# Patient Record
Sex: MALE | Race: WHITE | Employment: OTHER | URBAN - METROPOLITAN AREA
[De-identification: names, ages, dates, MRNs, and addresses within clinical notes are randomized per-mention and may not be internally consistent; named-entity substitution may affect disease eponyms.]

---

## 2017-02-17 ENCOUNTER — HOSPITAL ENCOUNTER (EMERGENCY)
Age: 47
Discharge: HOME | End: 2017-02-17
Attending: EMERGENCY MEDICINE
Payer: COMMERCIAL

## 2017-02-17 ENCOUNTER — APPOINTMENT (OUTPATIENT)
Dept: GENERAL RADIOLOGY | Age: 47
End: 2017-02-17
Attending: EMERGENCY MEDICINE
Payer: COMMERCIAL

## 2017-02-17 VITALS
BODY MASS INDEX: 30.67 KG/M2 | WEIGHT: 190 LBS | RESPIRATION RATE: 16 BRPM | OXYGEN SATURATION: 98 % | DIASTOLIC BLOOD PRESSURE: 73 MMHG | SYSTOLIC BLOOD PRESSURE: 124 MMHG | TEMPERATURE: 97.8 F | HEART RATE: 69 BPM
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
## 2017-02-17 DIAGNOSIS — S61.451A DOG BITE OF RIGHT HAND, INITIAL ENCOUNTER: Primary | ICD-10-CM

## 2017-02-17 PROCEDURE — 90471 IMMUNIZATION ADMIN: CPT | Performed by: EMERGENCY MEDICINE

## 2017-02-17 PROCEDURE — 99282 EMERGENCY DEPT VISIT SF MDM: CPT | Performed by: EMERGENCY MEDICINE

## 2017-02-17 PROCEDURE — 74011250636 HC RX REV CODE- 250/636: Performed by: EMERGENCY MEDICINE

## 2017-02-17 PROCEDURE — 90715 TDAP VACCINE 7 YRS/> IM: CPT | Performed by: EMERGENCY MEDICINE

## 2017-02-17 PROCEDURE — 73130 X-RAY EXAM OF HAND: CPT

## 2017-02-17 RX ORDER — AMOXICILLIN AND CLAVULANATE POTASSIUM 875; 125 MG/1; MG/1
1 TABLET, FILM COATED ORAL 2 TIMES DAILY
Qty: 10 TAB | Refills: 0 | Status: SHIPPED | OUTPATIENT
Start: 2017-02-17 | End: 2020-05-11 | Stop reason: CLARIF

## 2017-02-17 RX ADMIN — TETANUS TOXOID, REDUCED DIPHTHERIA TOXOID AND ACELLULAR PERTUSSIS VACCINE, ADSORBED 0.5 ML: 5; 2.5; 8; 8; 2.5 SUSPENSION INTRAMUSCULAR at 21:47

## 2017-02-18 NOTE — ED PROVIDER NOTES
HPI Comments: Patient states he reached down to feed his sisters dog and the dog bit him on his right hand. Right hand dominant. Puncture wounds, pain, swelling. Has been immunized for tetanus - last booster > 10 years. Patient is a 40 y.o. male presenting with dog bite. The history is provided by the patient and the spouse. Dog Bite    The incident occurred just prior to arrival. The incident occurred at home. There is an injury to the right thumb. The pain is mild. It is unlikely that a foreign body is present. He is right-handed. His tetanus status is out of date. History reviewed. No pertinent past medical history. Past Surgical History:   Procedure Laterality Date    Hx orthopaedic       r knee scope         History reviewed. No pertinent family history. Social History     Social History    Marital status:      Spouse name: N/A    Number of children: N/A    Years of education: N/A     Occupational History    Not on file. Social History Main Topics    Smoking status: Never Smoker    Smokeless tobacco: Never Used    Alcohol use Yes      Comment: occ    Drug use: No    Sexual activity: No     Other Topics Concern    Not on file     Social History Narrative         ALLERGIES: Review of patient's allergies indicates no known allergies. Review of Systems   Constitutional: Negative. Respiratory: Negative. Cardiovascular: Negative. Musculoskeletal: Positive for myalgias. Skin: Positive for wound. Neurological: Negative. Vitals:    02/17/17 2040   BP: 124/73   Pulse: 69   Resp: 16   Temp: 97.8 °F (36.6 °C)   SpO2: 98%   Weight: 86.2 kg (190 lb)            Physical Exam   Constitutional: He appears well-developed and well-nourished. Cardiovascular: Normal rate, regular rhythm, normal heart sounds and intact distal pulses.     Pulmonary/Chest: Effort normal and breath sounds normal.   Musculoskeletal:   Right hand with swelling dorsal aspect over index and middle metacarpal areas distally. Puncture wounds to the palm, right lateral thenar eminence, cuticle area of thumb nail. Subungual hematoma 3 mm crescent base of nail. Vitals reviewed.        MDM  ED Course       Procedures      Dog bite right hand  Wounds cleaned with soap and water  X ray no fracture  TDAP booster  Rx Augmentin 875 bid prophylaxis  Elevation, ice, ibuprofen, clean with Dial soap  Return if worse of no better  Follow up PCP  Instructions discussed with patient and wife

## 2017-02-18 NOTE — DISCHARGE INSTRUCTIONS
Elevate hand to reduce swelling  Ice to reduce pain and swelling  Ibuprofen as needed  Keep hand clean with Dial soap and water  Take antibiotics until finished       Mordeduras de animales: Instrucciones de cuidado - [ Animal Bites: Care Instructions ]  Instrucciones de cuidado  Después de la mordedura de un animal, la preocupación principal es essence infección. La posibilidad de que se infecte depende del tipo de animal que lo mordió, la parte del cuerpo donde lo mordió y reno estado de jevon general. Muchas mordeduras de animales no se cierran con puntos de sutura, ya que esto puede aumentar la probabilidad de infección. La mordedura puede tardar en sanar desde 7 días hasta varios meses, dependiendo de lo grave que sea. Cuidar maria elena reno herida en casa ayudará a que sane y reducirá la probabilidad de infección. El médico lo rooney examinado minuciosamente, jackie pueden desarrollarse problemas más tarde. Si nota algún problema o nuevos síntomas, busque tratamiento médico de inmediato. La atención de seguimiento es essence parte clave de reno tratamiento y seguridad. Asegúrese de hacer y acudir a todas las citas, y llame a reno médico si está teniendo problemas. También es essence buena idea saber los resultados de los exámenes y mantener essence lista de los medicamentos que janell. ¿Cómo puede cuidarse en el hogar? · Si reno médico le dijo cómo cuidarse la herida, siga las instrucciones de reno médico. Si no le leslie instrucciones, siga estos consejos generales:  ¨ Después de 24 a 48 horas, lávese la herida suavemente con agua limpia 2 veces al día. No se frote ni empape la herida. No use peróxido de hidrógeno (agua [place]) ni alcohol, los cuales pueden retrasar la sanación. ¨ Puede cubrirse la herida con essence capa delgada de vaselina y essence venda no adherente. ¨ Aplíquese más vaselina y [facility] la venda según sea necesario. · Después de ducharse, séquese suavemente la herida con essence toalla limpia.   · Si reno médico le rooney [address] herida, cúbrase la venda con essence bolsa de plástico antes de ducharse. · Un poco de enrojecimiento de la piel e hinchazón alrededor de los bordes de la herida y de las suturas o grapas son normales. La herida puede picar o irritarse. No se rasque ni frote la herida. · Pregúntele a reno médico si puede sarwat un analgésico (medicamento para el dolor) de venta rere, maria eugenia acetaminofén (Tylenol), ibuprofeno (Advil, Motrin) o naproxeno (Aleve). Natalia y siga todas las instrucciones de la [place]. · No tome dos o más analgésicos al mismo tiempo a menos que el médico se lo haya indicado. Muchos analgésicos contienen acetaminofén, lo cual es Tylenol. El exceso de acetaminofén (Tylenol) puede ser dañino. · Si reno mordedura lo pone en riesgo de contraer edwin, le aplicarán essence serie de vacunas a lo shana de las próximas semanas para prevenir la edwin. [place] médico le indicará cuándo vacunarse. Es muy importante que se ponga el ciclo completo de vacunas. Siga de [address] indicaciones de reno médico.  · Puede que necesite essence vacuna antitetánica si no le denise aplicado essence en los últimos 5 Los manohar. · Si reno médico le recetó antibióticos, tómelos según las indicaciones. No deje de tomarlos solo porque se sienta mejor. Debe sarwat todos los antibióticos hasta terminarlos. ¿Cuándo debe pedir ayuda? Llame a reno médico ahora mismo o busque atención médica inmediata si:  · La piel próxima a la mordedura se vuelve fría o pálida, o cambia de color. · Pierde sensibilidad en la natasha cerca de la mordedura, o siente entumecimiento u hormigueo. · Tiene dificultades para  essence extremidad cercana a la mordedura. · Tiene síntomas de infección, tales maria eugenia:  ¨ Aumento del dolor, la hinchazón, la temperatura o el enrojecimiento cerca de la herida. ¨ Vetas rojizas que salen de la herida. ¨ Pus que sale de la herida. Alisia Corral. · La addie empapa la venda. Es normal que salgan pequeñas cantidades de [place]. · [place] dolor está empeorando.   Vigile muy de cerca los [facility], y asegúrese de comunicarse con reno médico si no está mejorando maria eugenia se esperaba. ¿Dónde puede encontrar más información en inglés? Sofie Payton a http://bianka-fabrice.info/. Campbell Zhong D099 en la búsqueda para aprender más acerca de \"Mordeduras de animales: Instrucciones de cuidado - [ Animal Bites: Care Instructions ]. \"  Revisado: [address], 2016  Versión del contenido: 11.1  © 4216-3112 [employer], [employer]. Las instrucciones de cuidado fueron adaptadas bajo licencia por [employer] (which disclaims liability or warranty for this information). Si usted tiene [place] afección médica o sobre estas instrucciones, siempre pregunte a reno profesional de jevon. [employer] niega toda garantía o responsabilidad por reno uso de esta información.

## 2017-02-18 NOTE — ED TRIAGE NOTES
Pt reports he was trying to feed his sisters dog and got bitten on the right hand. Pt unsure if dogs vaccines are up to date.       Windy Childs RN

## 2020-05-11 RX ORDER — SODIUM CHLORIDE 0.9 % (FLUSH) 0.9 %
5-40 SYRINGE (ML) INJECTION AS NEEDED
Status: CANCELLED | OUTPATIENT
Start: 2020-05-11

## 2020-05-11 RX ORDER — SODIUM CHLORIDE 0.9 % (FLUSH) 0.9 %
5-40 SYRINGE (ML) INJECTION EVERY 8 HOURS
Status: CANCELLED | OUTPATIENT
Start: 2020-05-11

## 2020-05-14 NOTE — H&P
Date of Service: 2020-05-04  Work Status:  ????? Allergies:????? Medications:Meloxicam (7.5 MG); Vitamin C;ZyrTEC Allergy    CC: Injury of the right shoulder    HPI: The patient 43-year-old right-hand-dominant  male who works as a . He was playing with his dog and he stumbled and tripped and fell backwards catching or on his right upper extremity and injuring the shoulder. This was about a month ago on 4/3/20. He is continued to have some pain and weakness in the arm and shoulder reason painting with his left hand because he has difficulty in raising the right arm. He has been seen in emergency MDs had an MRI that apparently showed tear of his rotator cuff. He says the shoulder aches down into the back of his forearm bothers him at night as well as with movements. He has had some neck pain but doesnt seem related to his shoulder problem specifically. PHSH & ROS:  This form has been filled out reviewed and is included in the chart. Patient has some scattered positive findings on the review of systems most related to his history of present illness. The patient drinks he doesnt smoke. He has a history of heartburn hes had surgery on his knee. Current medicines: Are listed. drug allergies: None known    PE: The patient is an alert oriented generally healthy-appearing middle-aged  male. He has 5-6 inches tall and weighs 190 pounds. His neck shows good gentle range of motion without masses or asymmetry. The right shoulder shows some diffuse subacromial tenderness to palpation his before meals joint is not painful. He has decreased range of motion of the shoulder ache and really not quite raise the arm even to 90° of flexion or abduction. Passively I am able to bring his arm up to 140° or more of flexion and abduction. At 90° of abduction ER/IR is 90/85. He has weakness in abduction against resistance. He has a positive overhead impingement sign.   He has negative drop arm test.  Circulation: There is a palpable radial pulse at the right wrist.  Neurologic exam: Sensation is intact to light touch in the radial ulnar median nerve distribution  Skin and nails: The patient has some tattoos on the lateral aspect of his right arm but they have a lot of the lateral margin of the acromion by 5 or 6 cm or more. He has no obvious open wounds or lesions his nails appear normal.      MRI: The patient is had an MRI done at emergency room date. I reviewed the films and appears that he has a full-thickness tear of the supraspinatus. I do not have the written report from the radiologist available  Diagnosis: Tear of the right rotator cuff    Disposition: The problem was discussed with the patient appears that he has a full-thickness tear of his rotator cuff with retraction and weakness. Going to get the report from this MRI that it appears that he does have the rotator cuff tear and I recommended he consider surgical repair. He wishes to proceed with scheduling for the procedure to be done as an outpatient under brachial plexus block and general anesthesia I discussed with him the recovery time and the need to have this immobilized and protected for several weeks afterwards. Hell be scheduled for an open right rotator cuff repair and acromioplasty.

## 2020-05-18 ENCOUNTER — ANESTHESIA EVENT (OUTPATIENT)
Dept: SURGERY | Age: 50
End: 2020-05-18
Payer: COMMERCIAL

## 2020-05-19 ENCOUNTER — HOSPITAL ENCOUNTER (OUTPATIENT)
Age: 50
Setting detail: OUTPATIENT SURGERY
Discharge: HOME | End: 2020-05-19
Attending: ORTHOPAEDIC SURGERY | Admitting: ORTHOPAEDIC SURGERY
Payer: COMMERCIAL

## 2020-05-19 ENCOUNTER — ANESTHESIA (OUTPATIENT)
Dept: SURGERY | Age: 50
End: 2020-05-19
Payer: COMMERCIAL

## 2020-05-19 VITALS
RESPIRATION RATE: 19 BRPM | WEIGHT: 190 LBS | HEART RATE: 66 BPM | TEMPERATURE: 97.7 F | BODY MASS INDEX: 30.67 KG/M2 | SYSTOLIC BLOOD PRESSURE: 129 MMHG | DIASTOLIC BLOOD PRESSURE: 76 MMHG | OXYGEN SATURATION: 94 %

## 2020-05-19 DIAGNOSIS — G89.18 POST-OP PAIN: Primary | ICD-10-CM

## 2020-05-19 PROCEDURE — 74011000250 HC RX REV CODE- 250: Performed by: NURSE ANESTHETIST, CERTIFIED REGISTERED

## 2020-05-19 PROCEDURE — 76210000020 HC REC RM PH II FIRST 0.5 HR: Performed by: ORTHOPAEDIC SURGERY

## 2020-05-19 PROCEDURE — 76942 ECHO GUIDE FOR BIOPSY: CPT | Performed by: ORTHOPAEDIC SURGERY

## 2020-05-19 PROCEDURE — 76010000162 HC OR TIME 1.5 TO 2 HR INTENSV-TIER 1: Performed by: ORTHOPAEDIC SURGERY

## 2020-05-19 PROCEDURE — C1713 ANCHOR/SCREW BN/BN,TIS/BN: HCPCS | Performed by: ORTHOPAEDIC SURGERY

## 2020-05-19 PROCEDURE — 77030002913 HC SUT ETHBND J&J -B: Performed by: ORTHOPAEDIC SURGERY

## 2020-05-19 PROCEDURE — 77030016370 HC SUT ULTBRD S&N -B: Performed by: ORTHOPAEDIC SURGERY

## 2020-05-19 PROCEDURE — 77030002933 HC SUT MCRYL J&J -A: Performed by: ORTHOPAEDIC SURGERY

## 2020-05-19 PROCEDURE — 77030036560 HC SHLDR ARM PAD ABDUCTN S2SG -B: Performed by: ORTHOPAEDIC SURGERY

## 2020-05-19 PROCEDURE — 77030019908 HC STETH ESOPH SIMS -A: Performed by: ANESTHESIOLOGY

## 2020-05-19 PROCEDURE — 74011250636 HC RX REV CODE- 250/636: Performed by: NURSE ANESTHETIST, CERTIFIED REGISTERED

## 2020-05-19 PROCEDURE — 77030002966 HC SUT PDS J&J -A: Performed by: ORTHOPAEDIC SURGERY

## 2020-05-19 PROCEDURE — 74011250636 HC RX REV CODE- 250/636: Performed by: ANESTHESIOLOGY

## 2020-05-19 PROCEDURE — 76010010054 HC POST OP PAIN BLOCK: Performed by: ORTHOPAEDIC SURGERY

## 2020-05-19 PROCEDURE — 77030039425 HC BLD LARYNG TRULITE DISP TELE -A: Performed by: ANESTHESIOLOGY

## 2020-05-19 PROCEDURE — 74011250637 HC RX REV CODE- 250/637: Performed by: ANESTHESIOLOGY

## 2020-05-19 PROCEDURE — 77030040361 HC SLV COMPR DVT MDII -B: Performed by: ORTHOPAEDIC SURGERY

## 2020-05-19 PROCEDURE — 76210000006 HC OR PH I REC 0.5 TO 1 HR: Performed by: ORTHOPAEDIC SURGERY

## 2020-05-19 PROCEDURE — 74011000250 HC RX REV CODE- 250: Performed by: ANESTHESIOLOGY

## 2020-05-19 PROCEDURE — 76060000035 HC ANESTHESIA 2 TO 2.5 HR: Performed by: ORTHOPAEDIC SURGERY

## 2020-05-19 PROCEDURE — 77030003602 HC NDL NRV BLK BBMI -B: Performed by: ANESTHESIOLOGY

## 2020-05-19 PROCEDURE — 74011250636 HC RX REV CODE- 250/636: Performed by: ORTHOPAEDIC SURGERY

## 2020-05-19 PROCEDURE — 77030018836 HC SOL IRR NACL ICUM -A: Performed by: ORTHOPAEDIC SURGERY

## 2020-05-19 PROCEDURE — 77030041434 HC IMPL CLLGN REGENETEN SN -H1: Performed by: ORTHOPAEDIC SURGERY

## 2020-05-19 DEVICE — BONE ANCHORS 3 WITH ARTHROSCOPIC DELIVERY SYSTEM ADVANCED
Type: IMPLANTABLE DEVICE | Site: SHOULDER | Status: FUNCTIONAL
Brand: BONE ANCHORS WITH ARTHROSCOPIC DELIVERY SYSTEM - ADVANCED

## 2020-05-19 DEVICE — FOOTPRINT ULTRA PK SUTURE ANCHOR 4.5
Type: IMPLANTABLE DEVICE | Site: SHOULDER | Status: FUNCTIONAL
Brand: FOOTPRINT

## 2020-05-19 DEVICE — IMPLANTABLE DEVICE
Type: IMPLANTABLE DEVICE | Site: SHOULDER | Status: FUNCTIONAL
Brand: BIOINDUCTIVE IMPLANT WITH ARTHROSCOPIC DELIVERY SYSTEM - MEDIUM

## 2020-05-19 DEVICE — TWINFIX ULTRA 4.5 MM POLY L LACTIC                                    ACID-HA SUTURE ANCHOR WITH TWO NO.2                                    ULTRABRAID SUTURES BLUE,                                    BLUE-COBRAID WITH NEEDLES
Type: IMPLANTABLE DEVICE | Site: SHOULDER | Status: FUNCTIONAL
Brand: TWINFIX

## 2020-05-19 DEVICE — ANCHOR SHLDR TEND 8 BIOINDCTVE -- USE W/2503-A FORMERLY 2516-1: Type: IMPLANTABLE DEVICE | Site: SHOULDER | Status: FUNCTIONAL

## 2020-05-19 RX ORDER — SODIUM CHLORIDE, SODIUM LACTATE, POTASSIUM CHLORIDE, CALCIUM CHLORIDE 600; 310; 30; 20 MG/100ML; MG/100ML; MG/100ML; MG/100ML
75 INJECTION, SOLUTION INTRAVENOUS CONTINUOUS
Status: DISCONTINUED | OUTPATIENT
Start: 2020-05-19 | End: 2020-05-19 | Stop reason: HOSPADM

## 2020-05-19 RX ORDER — ONDANSETRON 8 MG/1
8 TABLET, ORALLY DISINTEGRATING ORAL
Qty: 12 TAB | Refills: 1 | Status: SHIPPED | OUTPATIENT
Start: 2020-05-19 | End: 2020-10-05

## 2020-05-19 RX ORDER — HYDROMORPHONE HYDROCHLORIDE 2 MG/ML
0.5 INJECTION, SOLUTION INTRAMUSCULAR; INTRAVENOUS; SUBCUTANEOUS
Status: DISCONTINUED | OUTPATIENT
Start: 2020-05-19 | End: 2020-05-19 | Stop reason: HOSPADM

## 2020-05-19 RX ORDER — ROCURONIUM BROMIDE 10 MG/ML
INJECTION, SOLUTION INTRAVENOUS AS NEEDED
Status: DISCONTINUED | OUTPATIENT
Start: 2020-05-19 | End: 2020-05-19 | Stop reason: HOSPADM

## 2020-05-19 RX ORDER — CEFAZOLIN SODIUM/WATER 2 G/20 ML
2 SYRINGE (ML) INTRAVENOUS
Status: COMPLETED | OUTPATIENT
Start: 2020-05-19 | End: 2020-05-19

## 2020-05-19 RX ORDER — OXYCODONE HYDROCHLORIDE 5 MG/1
10 TABLET ORAL
Status: DISCONTINUED | OUTPATIENT
Start: 2020-05-19 | End: 2020-05-19 | Stop reason: HOSPADM

## 2020-05-19 RX ORDER — ONDANSETRON 2 MG/ML
INJECTION INTRAMUSCULAR; INTRAVENOUS AS NEEDED
Status: DISCONTINUED | OUTPATIENT
Start: 2020-05-19 | End: 2020-05-19 | Stop reason: HOSPADM

## 2020-05-19 RX ORDER — BUPIVACAINE HYDROCHLORIDE AND EPINEPHRINE 5; 5 MG/ML; UG/ML
INJECTION, SOLUTION EPIDURAL; INTRACAUDAL; PERINEURAL
Status: COMPLETED | OUTPATIENT
Start: 2020-05-19 | End: 2020-05-19

## 2020-05-19 RX ORDER — MIDAZOLAM HYDROCHLORIDE 1 MG/ML
2 INJECTION, SOLUTION INTRAMUSCULAR; INTRAVENOUS ONCE
Status: COMPLETED | OUTPATIENT
Start: 2020-05-19 | End: 2020-05-19

## 2020-05-19 RX ORDER — MIDAZOLAM HYDROCHLORIDE 1 MG/ML
2 INJECTION, SOLUTION INTRAMUSCULAR; INTRAVENOUS ONCE
Status: DISCONTINUED | OUTPATIENT
Start: 2020-05-19 | End: 2020-05-19 | Stop reason: HOSPADM

## 2020-05-19 RX ORDER — DEXAMETHASONE SODIUM PHOSPHATE 4 MG/ML
INJECTION, SOLUTION INTRA-ARTICULAR; INTRALESIONAL; INTRAMUSCULAR; INTRAVENOUS; SOFT TISSUE AS NEEDED
Status: DISCONTINUED | OUTPATIENT
Start: 2020-05-19 | End: 2020-05-19 | Stop reason: HOSPADM

## 2020-05-19 RX ORDER — SODIUM CHLORIDE 0.9 % (FLUSH) 0.9 %
5-40 SYRINGE (ML) INJECTION EVERY 8 HOURS
Status: DISCONTINUED | OUTPATIENT
Start: 2020-05-19 | End: 2020-05-19 | Stop reason: HOSPADM

## 2020-05-19 RX ORDER — SUCCINYLCHOLINE CHLORIDE 20 MG/ML
INJECTION INTRAMUSCULAR; INTRAVENOUS AS NEEDED
Status: DISCONTINUED | OUTPATIENT
Start: 2020-05-19 | End: 2020-05-19 | Stop reason: HOSPADM

## 2020-05-19 RX ORDER — HYDROMORPHONE HYDROCHLORIDE 2 MG/1
2 TABLET ORAL
Qty: 40 TAB | Refills: 0 | Status: SHIPPED | OUTPATIENT
Start: 2020-05-19 | End: 2020-05-24

## 2020-05-19 RX ORDER — FAMOTIDINE 20 MG/1
20 TABLET, FILM COATED ORAL ONCE
Status: COMPLETED | OUTPATIENT
Start: 2020-05-19 | End: 2020-05-19

## 2020-05-19 RX ORDER — SODIUM CHLORIDE 0.9 % (FLUSH) 0.9 %
5-40 SYRINGE (ML) INJECTION AS NEEDED
Status: DISCONTINUED | OUTPATIENT
Start: 2020-05-19 | End: 2020-05-19 | Stop reason: HOSPADM

## 2020-05-19 RX ORDER — FENTANYL CITRATE 50 UG/ML
INJECTION, SOLUTION INTRAMUSCULAR; INTRAVENOUS AS NEEDED
Status: DISCONTINUED | OUTPATIENT
Start: 2020-05-19 | End: 2020-05-19 | Stop reason: HOSPADM

## 2020-05-19 RX ORDER — PROPOFOL 10 MG/ML
INJECTION, EMULSION INTRAVENOUS AS NEEDED
Status: DISCONTINUED | OUTPATIENT
Start: 2020-05-19 | End: 2020-05-19 | Stop reason: HOSPADM

## 2020-05-19 RX ORDER — LIDOCAINE HYDROCHLORIDE 10 MG/ML
0.1 INJECTION INFILTRATION; PERINEURAL AS NEEDED
Status: DISCONTINUED | OUTPATIENT
Start: 2020-05-19 | End: 2020-05-19 | Stop reason: HOSPADM

## 2020-05-19 RX ORDER — OXYCODONE HYDROCHLORIDE 5 MG/1
5 TABLET ORAL
Status: DISCONTINUED | OUTPATIENT
Start: 2020-05-19 | End: 2020-05-19 | Stop reason: HOSPADM

## 2020-05-19 RX ORDER — FENTANYL CITRATE 50 UG/ML
100 INJECTION, SOLUTION INTRAMUSCULAR; INTRAVENOUS ONCE
Status: COMPLETED | OUTPATIENT
Start: 2020-05-19 | End: 2020-05-19

## 2020-05-19 RX ADMIN — PHENYLEPHRINE HYDROCHLORIDE 100 MCG: 10 INJECTION INTRAVENOUS at 12:56

## 2020-05-19 RX ADMIN — ROCURONIUM BROMIDE 5 MG: 10 INJECTION, SOLUTION INTRAVENOUS at 11:56

## 2020-05-19 RX ADMIN — SODIUM CHLORIDE, SODIUM LACTATE, POTASSIUM CHLORIDE, AND CALCIUM CHLORIDE 75 ML/HR: 600; 310; 30; 20 INJECTION, SOLUTION INTRAVENOUS at 09:49

## 2020-05-19 RX ADMIN — FENTANYL CITRATE 100 MCG: 0.05 INJECTION, SOLUTION INTRAMUSCULAR; INTRAVENOUS at 10:53

## 2020-05-19 RX ADMIN — FAMOTIDINE 20 MG: 20 TABLET ORAL at 09:49

## 2020-05-19 RX ADMIN — PHENYLEPHRINE HYDROCHLORIDE 100 MCG: 10 INJECTION INTRAVENOUS at 13:25

## 2020-05-19 RX ADMIN — MIDAZOLAM 2 MG: 1 INJECTION INTRAMUSCULAR; INTRAVENOUS at 10:53

## 2020-05-19 RX ADMIN — PHENYLEPHRINE HYDROCHLORIDE 100 MCG: 10 INJECTION INTRAVENOUS at 13:14

## 2020-05-19 RX ADMIN — SUCCINYLCHOLINE CHLORIDE 130 MG: 20 INJECTION, SOLUTION INTRAMUSCULAR; INTRAVENOUS at 11:56

## 2020-05-19 RX ADMIN — Medication 2 G: at 12:04

## 2020-05-19 RX ADMIN — PHENYLEPHRINE HYDROCHLORIDE 100 MCG: 10 INJECTION INTRAVENOUS at 13:09

## 2020-05-19 RX ADMIN — PHENYLEPHRINE HYDROCHLORIDE 100 MCG: 10 INJECTION INTRAVENOUS at 13:01

## 2020-05-19 RX ADMIN — PHENYLEPHRINE HYDROCHLORIDE 100 MCG: 10 INJECTION INTRAVENOUS at 12:48

## 2020-05-19 RX ADMIN — FENTANYL CITRATE 100 MCG: 50 INJECTION INTRAMUSCULAR; INTRAVENOUS at 12:05

## 2020-05-19 RX ADMIN — DEXAMETHASONE SODIUM PHOSPHATE 4 MG: 4 INJECTION, SOLUTION INTRAMUSCULAR; INTRAVENOUS at 13:16

## 2020-05-19 RX ADMIN — BUPIVACAINE HYDROCHLORIDE AND EPINEPHRINE BITARTRATE 35 ML: 5; .005 INJECTION, SOLUTION EPIDURAL; INTRACAUDAL; PERINEURAL at 10:58

## 2020-05-19 RX ADMIN — SODIUM CHLORIDE, SODIUM LACTATE, POTASSIUM CHLORIDE, AND CALCIUM CHLORIDE: 600; 310; 30; 20 INJECTION, SOLUTION INTRAVENOUS at 12:50

## 2020-05-19 RX ADMIN — PROPOFOL 200 MG: 10 INJECTION, EMULSION INTRAVENOUS at 11:56

## 2020-05-19 RX ADMIN — SODIUM CHLORIDE, SODIUM LACTATE, POTASSIUM CHLORIDE, AND CALCIUM CHLORIDE: 600; 310; 30; 20 INJECTION, SOLUTION INTRAVENOUS at 11:49

## 2020-05-19 RX ADMIN — ONDANSETRON 4 MG: 2 INJECTION INTRAMUSCULAR; INTRAVENOUS at 13:09

## 2020-05-19 NOTE — OP NOTES
[facility]  OPERATIVE REPORT    Name:  Liane Lizama  MR#:  [id number]  :  1970  ACCOUNT #:  [de-identified]  DATE OF SERVICE:  2020    PREOPERATIVE DIAGNOSES:  1. Right rotator cuff tear. 2.  Bicipital tendonitis, right shoulder. POSTOPERATIVE DIAGNOSES:  1. Right rotator cuff tear. 2.  Bicipital tendonitis, right shoulder. PROCEDURE PERFORMED:  1. Open right rotator cuff repair. 2.  Tenodesis of right long head of the biceps. 3.  Open right acromioplasty. SURGEON:  Fran Rabago MD    ASSISTANT:  None. ANESTHESIA:  General and interscalene block. COMPLICATIONS:  None. SPECIMENS REMOVED:  None. IMPLANTS:  Three TwinFix Smith and Nephew 4.5 PEEK anchors and a Smith and Nephew Regeneten bioinductive graft. ESTIMATED BLOOD LOSS:  Less than 100 mL. PROCEDURAL NOTE:  The patient was given preoperative interscalene block and brought to the operating room and positioned on the shoulder frame in the beach chair position. The base of the neck, shoulder, arm and forearm were prepped with ChloraPrep and draped as a sterile field. A time-out was held identifying the patient, surgeon, procedure and operative site. The patient had been given prophylactic IV Ancef preoperatively. A transverse saber cut incision was made along the lateral margin of the acromion and carried down through the skin and subcutaneous tissues. Small bleeders were electrocoagulated. Subcutaneous tissues were elevated off of the underlying fascia of the biceps. A split was made in the biceps fascia along the anterior third of the acromion and the anterior portion of the deltoid split off of the acromion. The underneath surface of the acromion was fairly prominent. An elevator was passed beneath the acromion and a small oscillating saw was used to perform an acromioplasty removing the downward sloping anterolateral edge. This was smoothed further with the rongeur then. There was quite a bit of redundant thickened bursal tissue that was excised for better exposure of the rotator cuff. There was a large cuff tear involving the anterior and midportion of the supraspinatus. It was torn free from the lateral aspect that the greater tuberosity was also split longitudinally. The overall width of the tear was about 2 cm. The corners of the tendon were identified and traction stitches were then placed into these to help mobilize the tendon. The tendon was easily mobilized. The long head of the biceps was noted to be out of its groove and was also flattened and split. It was elected to do a tenodesis of this. Pulling the tendon as far out as possible, the tendon was transected and tagged. The bicipital groove was prepared and a Smith and [employer] Footprint anchor utilized. A 2 Ultrabraid suture was woven through the biceps and then passed through the footprint anchor and placed into a drill hole in the bicipital groove. The more proximal portion of the tendon was preserved and later sutured into the distal part of the repair of the rotator cuff itself. The rotator cuff was repaired using a total of three Smith and Nephew TwinFix 4.5 PEEK anchors each loaded with two strands of 2 Ultrabraid. One was placed into the anterior part of the corner of the tear, the other posteriorly, and then the third placed in the area where the split came together laterally. These were then used through the free edges of the tendon giving good closure of the rotator cuff. The split in the tendon was also repaired then with figure-of-eight stitches of 0 PDS. To help reinforce the repair, a [place] bioinductive Regeneten graft was utilized. This was held in place over the repair, and using a combination of the soft tissue staples and the bone staples, it was anchored into position. Overall, good repair of the tendon was obtained.   Two of the stitches from the anterior part of the repair of the tendon were then passed through the free end of the biceps and tied snugly further reinforcing the tenodesis. The wound was thoroughly irrigated. The deltoid was repaired back using transosseous inverted sutures of 2 Ethibond and 0 PDS. The subcutaneous tissues were closed with inverted sutures of 3-0 Monocryl and then Steri-Strips were applied with Mastisol adhesive. This was followed by an occlusive honeycomb dressing. The patient was immobilized in a sling in a shoulder immobilizer. He tolerated the procedure well and was sent to recovery room in good condition.       Joanna Sheridan MD      [id number]  D:  05/19/2020 14:04  T:  05/19/2020 15:06  JOB #:  [id number]  CC:  [id number] [facility]

## 2020-05-19 NOTE — ANESTHESIA PREPROCEDURE EVALUATION
Relevant Problems   No relevant active problems       Anesthetic History               Review of Systems / Medical History      Pulmonary                   Neuro/Psych              Cardiovascular                       GI/Hepatic/Renal     GERD: well controlled           Endo/Other             Other Findings              Physical Exam    Airway  Mallampati: II  TM Distance: > 6 cm  Neck ROM: normal range of motion   Mouth opening: Normal     Cardiovascular  Regular rate and rhythm,  S1 and S2 normal,  no murmur, click, rub, or gallop  Rhythm: regular  Rate: normal         Dental  No notable dental hx       Pulmonary  Breath sounds clear to auscultation               Abdominal         Other Findings            Anesthetic Plan    ASA: 2  Anesthesia type: general      Post-op pain plan if not by surgeon: peripheral nerve block single      Anesthetic plan and risks discussed with: Patient

## 2020-05-19 NOTE — ANESTHESIA PROCEDURE NOTES
Peripheral Block    Start time: 5/19/2020 10:52 AM  End time: 5/19/2020 10:57 AM  Performed by: Floresita Clay MD  Authorized by: Floresita Clay MD       Pre-procedure: Indications: at surgeon's request, post-op pain management and procedure for pain    Preanesthetic Checklist: patient identified, risks and benefits discussed, site marked, timeout performed, anesthesia consent given and patient being monitored    Timeout Time: 10:52          Block Type:   Block Type:   Interscalene  Laterality:  Right  Monitoring:  Standard ASA monitoring, continuous pulse ox, frequent vital sign checks, heart rate, oxygen and responsive to questions  Injection Technique:  Single shot  Procedures: ultrasound guided and nerve stimulator    Patient Position: supine  Prep: chlorhexidine    Location:  Interscalene  Needle Type:  Stimuplex  Needle Gauge:  21 G  Needle Localization:  Ultrasound guidance and anatomical landmarks  Motor Response: minimal motor response >0.4 mA      Assessment:  Number of attempts:  1  Injection Assessment:  Incremental injection every 5 mL, local visualized surrounding nerve on ultrasound, negative aspiration for blood, negative aspiration for CSF, no paresthesia, no intravascular symptoms and ultrasound image on chart  Patient tolerance:  Patient tolerated the procedure well with no immediate complications

## 2020-05-19 NOTE — BRIEF OP NOTE
Brief Postoperative Note    Patient: Genesis Sifuentes  YOB: 1970  MRN: [id number]    Date of Procedure: 5/19/2020     Pre-Op Diagnosis: Strain of muscle(s) and tendon(s) of the rotator cuff of right shoulder, initial encounter [E06.260X]  Right rotator cuff tear,  Biceps tendonitis    Post-Op Diagnosis: Same as preoperative diagnosis. Procedure(s):  RIGHT SHOULDER OPEN ROTATOR CUFF REPAIR, BICEPS TENODESIS AND ACROMIOPLASTY    Surgeon(s): Idania Miranda MD    Surgical Assistant: None    Anesthesia: General     Estimated Blood Loss (mL): less than 894     Complications: None    Specimens: * No specimens in log *     Implants:   Implant Name Type Inv.  Item Serial No.  Lot No. LRB No. Used Action   ANCHOR FOOTPRINT SUT 4.5MM --  - [id number]  ANCHOR FOOTPRINT SUT 4.5MM --   [place] [employer] [id number] Right 1 Implanted   ANCHOR SUT TWNFX 4.5 ULTR W/2 --  - [id number]  ANCHOR SUT TWNFX 4.5 ULTR W/2 --   [place] [employer] [id number] Right 3 Implanted   ANCHOR BNE 3 W/ARTHRO DEL SYS --  - [id number]  ANCHOR BNE 3 W/ARTHRO DEL SYS --   [employer] [id number] Right 1 Implanted   ANCHOR SHLDR TEND 8 BIOINDCTVE -- USE W/2503-A FORMERLY 2516-1 - [id number]  [facility] TEND 8 BIOINDCTVE -- USE W/2503-A FORMERLY 2516-1  [employer] [id number] Right 1 Implanted   SCAFF[employer] CLLGN RECON IMPL MED -- REGENETEN - [id number]  SCAFFOLD CLLGN RECON IMPL MED -- [place]  Right 1 Implanted       Drains: * No LDAs found *    Findings: LARGE CRESCENT SHAPED TEAR OF THE SUPRASPINATUS AND MARKED FRAYING AND DISLOCATION OF THE LONG HEAD OF THE BICEPS    Electronically Signed by Mana Santos MD on 5/19/2020 at 1:51 PM

## 2020-05-19 NOTE — ANESTHESIA POSTPROCEDURE EVALUATION
Procedure(s):  RIGHT SHOULDER OPEN ROTATOR CUFF REPAIR/ DISTAL CLAVICLE RESECTION AND ACROMIOPLASTY.     general    Anesthesia Post Evaluation      Multimodal analgesia: multimodal analgesia used between 6 hours prior to anesthesia start to PACU discharge  Patient location during evaluation: PACU  Patient participation: complete - patient participated  Level of consciousness: awake  Pain management: adequate  Airway patency: patent  Anesthetic complications: no  Cardiovascular status: acceptable  Respiratory status: spontaneous ventilation and acceptable  Hydration status: acceptable  Post anesthesia nausea and vomiting:  none      Vitals Value Taken Time   /76 5/19/2020  2:30 PM   Temp 36.5 °C (97.7 °F) 5/19/2020  1:53 PM   Pulse 66 5/19/2020  2:30 PM   Resp 19 5/19/2020  2:30 PM   SpO2 94 % 5/19/2020  2:30 PM

## 2020-05-19 NOTE — DISCHARGE INSTRUCTIONS
POST OP INSTRUCTIONS    Has appt for 5/29/20 at 2:40 PM at the [address] [place] [address] Dr Office  [phone]. Ice and elevate surgical extremity. Leave the Honeycomb dressing on and shower letting the right arm hang at the side    Movement: Do not try to raise the right arm away from the side. If able to tolerate, take Acetaminophen 325 mg  2 every 4 hrs   And                                        Ibuprofen 200 mg   3 every 6 hrs or  Aleve 220 mg  2 every 12 hrs to help with pain    ·                                      ( Do not take Ibuprofen if taking any other anti inflamatory  drug, NSAID, or anti arthritis drug or blood thinners)                                                                                                                                                                                           DIET  · Clear liquids until no nausea or vomiting; then light diet for the first day. · Advance to regular diet on second day, unless your doctor orders otherwise. · If nausea and vomiting continues, call your doctor. PAIN  · Take pain medication as directed by your doctor. · Call your doctor if pain is NOT relieved by medication. CALL YOUR DOCTOR IF   · Excessive bleeding that does not stop after holding pressure over the area  · Temperature of 101 degrees F or above  · Excessive redness, swelling or bruising, and/ or green or yellow, smelly discharge from incision    AFTER ANESTHESIA   · For the first 24 hours: DO NOT Drive, Drink alcoholic beverages, or Make important decisions. · Be aware of dizziness following anesthesia and while taking pain medication.        YOUR DOCTOR'S PHONE NUMBER [phone]      DISCHARGE SUMMARY from Nurse    PATIENT INSTRUCTIONS:    After general anesthesia or intravenous sedation, for 24 hours or while taking prescription Narcotics:  · Limit your activities  · Do not drive and operate hazardous machinery  · Do not make important personal or business decisions  · Do  not drink alcoholic beverages  · If you have not urinated within 8 hours after discharge, please contact your surgeon on call. *  Please give a list of your current medications to your Primary Care Provider. *  Please update this list whenever your medications are discontinued, doses are      changed, or new medications (including over-the-counter products) are added. *  Please carry medication information at all times in case of emergency situations. These are general instructions for a healthy lifestyle:    No smoking/ No tobacco products/ Avoid exposure to second hand smoke    Surgeon General's Warning:  Quitting smoking now greatly reduces serious risk to your health. Obesity, smoking, and sedentary lifestyle greatly increases your risk for illness    A healthy diet, regular physical exercise & weight monitoring are important for maintaining a healthy lifestyle    You may be retaining fluid if you have a history of heart failure or if you experience any of the following symptoms:  Weight gain of 3 pounds or more overnight or 5 pounds in a week, increased swelling in our hands or feet or shortness of breath while lying flat in bed. Please call your doctor as soon as you notice any of these symptoms; do not wait until your next office visit. Recognize signs and symptoms of STROKE:    F-face looks uneven    A-arms unable to move or move unevenly    S-speech slurred or non-existent    T-time-call 911 as soon as signs and symptoms begin-DO NOT go       Back to bed or wait to see if you get better-TIME IS BRAIN. Advance Care Planning  People with COVID-19 may have no symptoms, mild symptoms, such as fever, cough, and shortness of breath or they may have more severe illness, developing severe and fatal pneumonia.   As a result, Advance Care Planning with attention to naming a health care decision maker (someone you trust to make healthcare decisions for you if you could not speak for yourself) and sharing other health care preferences is important BEFORE a possible health crisis. Please contact your Primary Care Provider to discuss Advance Care Planning. Preventing the Spread of Coronavirus Disease 2019 in Homes and Residential Communities  For the most recent information go to [employer]    Prevention steps for People with confirmed or suspected COVID-19 (including persons under investigation) who do not need to be hospitalized  and   People with confirmed COVID-19 who were hospitalized and determined to be medically stable to go home    Your healthcare provider and public health staff will evaluate whether you can be cared for at home. If it is determined that you do not need to be hospitalized and can be isolated at home, you will be monitored by staff from your local or state health department. You should follow the prevention steps below until a healthcare provider or local or state health department says you can return to your normal activities. Stay home except to get medical care  People who are mildly ill with COVID-19 are able to isolate at home during their illness. You should restrict activities outside your home, except for getting medical care. Do not go to work, school, or public areas. Avoid using public transportation, ride-sharing, or taxis. Separate yourself from other people and animals in your home  People: As much as possible, you should stay in a specific room and away from other people in your home. Also, you should use a separate bathroom, if available. Animals: You should restrict contact with pets and other animals while you are sick with COVID-19, just like you would around other people.  Although there have not been reports of pets or other animals becoming sick with COVID-19, it is still recommended that people sick with COVID-19 limit contact with animals until more information is known about the virus. When possible, have another member of your household care for your animals while you are sick. If you are sick with COVID-19, avoid contact with your pet, including petting, snuggling, being kissed or licked, and sharing food. If you must care for your pet or be around animals while you are sick, wash your hands before and after you interact with pets and wear a facemask. Call ahead before visiting your doctor  If you have a medical appointment, call the healthcare provider and tell them that you have or may have COVID-19. This will help the healthcare providers office take steps to keep other people from getting infected or exposed. Wear a facemask  You should wear a facemask when you are around other people (e.g., sharing a room or vehicle) or pets and before you enter a healthcare providers office. If you are not able to wear a facemask (for example, because it causes trouble breathing), then people who live with you should not stay in the same room with you, or they should wear a facemask if they enter your room. Cover your coughs and sneezes  Cover your mouth and nose with a tissue when you cough or sneeze. Throw used tissues in a lined trash can. Immediately wash your hands with soap and water for at least 20 seconds or, if soap and water are not available, clean your hands with an alcohol-based hand  that contains at least 60% alcohol. Clean your hands often  Wash your hands often with soap and water for at least 20 seconds, especially after blowing your nose, coughing, or sneezing; going to the bathroom; and before eating or preparing food. If soap and water are not readily available, use an alcohol-based hand  with at least 60% alcohol, covering all surfaces of your hands and rubbing them together until they feel dry. Soap and water are the best option if hands are visibly dirty.  Avoid touching your eyes, nose, and mouth with unwashed hands. Avoid sharing personal household items  You should not share dishes, drinking glasses, cups, eating utensils, towels, or bedding with other people or pets in your home. After using these items, they should be washed thoroughly with soap and water. Clean all high-touch surfaces everyday  High touch surfaces include counters, tabletops, doorknobs, bathroom fixtures, toilets, phones, keyboards, tablets, and bedside tables. Also, clean any surfaces that may have blood, stool, or body fluids on them. Use a household cleaning spray or wipe, according to the label instructions. Labels contain instructions for safe and effective use of the cleaning product including precautions you should take when applying the product, such as wearing gloves and making sure you have good ventilation during use of the product. Monitor your symptoms  Seek prompt medical attention if your illness is worsening (e.g., difficulty breathing). Before seeking care, call your healthcare provider and tell them that you have, or are being evaluated for, COVID-19. Put on a facemask before you enter the facility. These steps will help the healthcare providers office to keep other people in the office or waiting room from getting infected or exposed. Ask your healthcare provider to call the local or state health department. Persons who are placed under active monitoring or facilitated self-monitoring should follow instructions provided by their local health department or occupational health professionals, as appropriate. When working with your local health department check their available hours. If you have a medical emergency and need to call 911, notify the dispatch personnel that you have, or are being evaluated for COVID-19. If possible, put on a facemask before emergency medical services arrive.   Discontinuing home isolation  Patients with confirmed COVID-19 should remain under home isolation precautions until the risk of secondary transmission to others is thought to be low. The decision to discontinue home isolation precautions should be made on a case-by-case basis, in consultation with healthcare providers and state and local health departments.

## 2020-05-19 NOTE — INTERVAL H&P NOTE
Update History & Physical    The Patient's History and Physical of May 4, 2020   was reviewed with the patient and I examined the patient. There was no change. The surgical site was confirmed by the patient and me. Pt is alert and oriented. Chest: Clear w/o SOB. C/V:  RR&R    Plan:  The risk, benefits, expected outcome, and alternative to the recommended procedure have been discussed with the patient. Patient understands and wants to proceed with an open repair of his right rotator cuff. .    Electronically signed by Cathryn Peres MD on 5/19/2020 at 11:18 AM

## 2020-09-07 ENCOUNTER — APPOINTMENT (OUTPATIENT)
Dept: GENERAL RADIOLOGY | Age: 50
End: 2020-09-07
Attending: EMERGENCY MEDICINE
Payer: COMMERCIAL

## 2020-09-07 ENCOUNTER — HOSPITAL ENCOUNTER (EMERGENCY)
Age: 50
Discharge: HOME | End: 2020-09-07
Attending: EMERGENCY MEDICINE
Payer: COMMERCIAL

## 2020-09-07 VITALS
WEIGHT: 195 LBS | HEART RATE: 64 BPM | RESPIRATION RATE: 21 BRPM | SYSTOLIC BLOOD PRESSURE: 117 MMHG | OXYGEN SATURATION: 94 % | DIASTOLIC BLOOD PRESSURE: 68 MMHG | BODY MASS INDEX: 31.34 KG/M2 | HEIGHT: 66 IN | TEMPERATURE: 98.5 F

## 2020-09-07 DIAGNOSIS — Z20.822 SUSPECTED COVID-19 VIRUS INFECTION: Primary | ICD-10-CM

## 2020-09-07 LAB
ALBUMIN SERPL-MCNC: 3.6 G/DL (ref 3.5–5)
ALBUMIN/GLOB SERPL: 0.9 {RATIO} (ref 1.2–3.5)
ALP SERPL-CCNC: 96 U/L (ref 50–136)
ALT SERPL-CCNC: 89 U/L (ref 12–65)
ANION GAP SERPL CALC-SCNC: 7 MMOL/L (ref 7–16)
APPEARANCE UR: CLEAR
AST SERPL-CCNC: 40 U/L (ref 15–37)
BACTERIA URNS QL MICRO: 0 /HPF
BASOPHILS # BLD: 0 K/UL (ref 0–0.2)
BASOPHILS NFR BLD: 0 % (ref 0–2)
BILIRUB SERPL-MCNC: 0.3 MG/DL (ref 0.2–1.1)
BILIRUB UR QL: NEGATIVE
BUN SERPL-MCNC: 10 MG/DL (ref 6–23)
CALCIUM SERPL-MCNC: 8.7 MG/DL (ref 8.3–10.4)
CHLORIDE SERPL-SCNC: 109 MMOL/L (ref 98–107)
CO2 SERPL-SCNC: 24 MMOL/L (ref 21–32)
COLOR UR: YELLOW
CREAT SERPL-MCNC: 1.02 MG/DL (ref 0.8–1.5)
DIFFERENTIAL METHOD BLD: ABNORMAL
EOSINOPHIL # BLD: 0 K/UL (ref 0–0.8)
EOSINOPHIL NFR BLD: 1 % (ref 0.5–7.8)
ERYTHROCYTE [DISTWIDTH] IN BLOOD BY AUTOMATED COUNT: 13.4 % (ref 11.9–14.6)
GLOBULIN SER CALC-MCNC: 4 G/DL (ref 2.3–3.5)
GLUCOSE SERPL-MCNC: 122 MG/DL (ref 65–100)
GLUCOSE UR STRIP.AUTO-MCNC: NEGATIVE MG/DL
HCT VFR BLD AUTO: 40.5 % (ref 41.1–50.3)
HGB BLD-MCNC: 13.5 G/DL (ref 13.6–17.2)
HGB UR QL STRIP: NEGATIVE
IMM GRANULOCYTES # BLD AUTO: 0 K/UL (ref 0–0.5)
IMM GRANULOCYTES NFR BLD AUTO: 0 % (ref 0–5)
KETONES UR QL STRIP.AUTO: NEGATIVE MG/DL
LACTATE SERPL-SCNC: 1.8 MMOL/L (ref 0.4–2)
LEUKOCYTE ESTERASE UR QL STRIP.AUTO: NEGATIVE
LYMPHOCYTES # BLD: 0.8 K/UL (ref 0.5–4.6)
LYMPHOCYTES NFR BLD: 16 % (ref 13–44)
MCH RBC QN AUTO: 30.6 PG (ref 26.1–32.9)
MCHC RBC AUTO-ENTMCNC: 33.3 G/DL (ref 31.4–35)
MCV RBC AUTO: 91.8 FL (ref 79.6–97.8)
MONOCYTES # BLD: 0.5 K/UL (ref 0.1–1.3)
MONOCYTES NFR BLD: 10 % (ref 4–12)
NEUTS SEG # BLD: 3.6 K/UL (ref 1.7–8.2)
NEUTS SEG NFR BLD: 73 % (ref 43–78)
NITRITE UR QL STRIP.AUTO: NEGATIVE
NRBC # BLD: 0 K/UL (ref 0–0.2)
PH UR STRIP: 7 [PH] (ref 5–9)
PLATELET # BLD AUTO: 153 K/UL (ref 150–450)
PMV BLD AUTO: 10.1 FL (ref 9.4–12.3)
POTASSIUM SERPL-SCNC: 3.9 MMOL/L (ref 3.5–5.1)
PROT SERPL-MCNC: 7.6 G/DL (ref 6.3–8.2)
PROT UR STRIP-MCNC: NEGATIVE MG/DL
RBC # BLD AUTO: 4.41 M/UL (ref 4.23–5.6)
SODIUM SERPL-SCNC: 140 MMOL/L (ref 136–145)
SP GR UR REFRACTOMETRY: 1.02 (ref 1–1.02)
UROBILINOGEN UR QL STRIP.AUTO: 0.2 EU/DL (ref 0.2–1)
WBC # BLD AUTO: 4.9 K/UL (ref 4.3–11.1)

## 2020-09-07 PROCEDURE — 74011250636 HC RX REV CODE- 250/636: Performed by: EMERGENCY MEDICINE

## 2020-09-07 PROCEDURE — 74011000258 HC RX REV CODE- 258: Performed by: EMERGENCY MEDICINE

## 2020-09-07 PROCEDURE — 85025 COMPLETE CBC W/AUTO DIFF WBC: CPT

## 2020-09-07 PROCEDURE — 80053 COMPREHEN METABOLIC PANEL: CPT

## 2020-09-07 PROCEDURE — 83605 ASSAY OF LACTIC ACID: CPT

## 2020-09-07 PROCEDURE — 99285 EMERGENCY DEPT VISIT HI MDM: CPT

## 2020-09-07 PROCEDURE — 87635 SARS-COV-2 COVID-19 AMP PRB: CPT

## 2020-09-07 PROCEDURE — 81003 URINALYSIS AUTO W/O SCOPE: CPT

## 2020-09-07 PROCEDURE — 93005 ELECTROCARDIOGRAM TRACING: CPT | Performed by: EMERGENCY MEDICINE

## 2020-09-07 PROCEDURE — 71045 X-RAY EXAM CHEST 1 VIEW: CPT

## 2020-09-07 PROCEDURE — 96368 THER/DIAG CONCURRENT INF: CPT

## 2020-09-07 PROCEDURE — 96365 THER/PROPH/DIAG IV INF INIT: CPT

## 2020-09-07 PROCEDURE — 87040 BLOOD CULTURE FOR BACTERIA: CPT

## 2020-09-07 PROCEDURE — 74011250637 HC RX REV CODE- 250/637: Performed by: EMERGENCY MEDICINE

## 2020-09-07 RX ORDER — IBUPROFEN 800 MG/1
800 TABLET ORAL
Status: COMPLETED | OUTPATIENT
Start: 2020-09-07 | End: 2020-09-07

## 2020-09-07 RX ORDER — ACETAMINOPHEN 500 MG
1000 TABLET ORAL
Status: COMPLETED | OUTPATIENT
Start: 2020-09-07 | End: 2020-09-07

## 2020-09-07 RX ORDER — DOXYCYCLINE HYCLATE 100 MG
100 TABLET ORAL 2 TIMES DAILY
Qty: 14 TAB | Refills: 0 | Status: SHIPPED | OUTPATIENT
Start: 2020-09-07 | End: 2020-09-18

## 2020-09-07 RX ADMIN — IBUPROFEN 800 MG: 800 TABLET, FILM COATED ORAL at 21:43

## 2020-09-07 RX ADMIN — AZITHROMYCIN MONOHYDRATE 500 MG: 500 INJECTION, POWDER, LYOPHILIZED, FOR SOLUTION INTRAVENOUS at 20:58

## 2020-09-07 RX ADMIN — SODIUM CHLORIDE 1000 ML: 900 INJECTION, SOLUTION INTRAVENOUS at 19:25

## 2020-09-07 RX ADMIN — ACETAMINOPHEN 1000 MG: 500 TABLET, FILM COATED ORAL at 19:15

## 2020-09-07 RX ADMIN — CEFTRIAXONE SODIUM 1 G: 1 INJECTION, POWDER, FOR SOLUTION INTRAMUSCULAR; INTRAVENOUS at 20:58

## 2020-09-07 NOTE — ED PROVIDER NOTES
Patient presents the ER complaint of fever, body aches and mild cough. Patient states he has been feeling sick for the past couple days. Denies any vomiting or diarrhea. Reports cough is minimally productive of sputum. Called to see patient in triage as he had a near syncopal episode. Found to be febrile, tachycardic, blood pressure in the 507 systolically. The history is provided by the patient. Fever    This is a new problem. The current episode started 2 days ago. The problem has not changed since onset. The maximum temperature noted was 101 - 101.9 F. Associated symptoms include muscle aches. Pertinent negatives include no chest pain, no congestion and no cough. Past Medical History:   Diagnosis Date    GERD (gastroesophageal reflux disease)     managed well with PRN OTC meds    Hypercholesteremia     no current meds at this time- attempted to manage with diet.     Right shoulder pain        Past Surgical History:   Procedure Laterality Date    HX KNEE ARTHROSCOPY Right     HX OTHER SURGICAL      eye surg as a child         Family History:   Problem Relation Age of Onset    Anemia Mother     Hypertension Father     Parkinson's Disease Father        Social History     Socioeconomic History    Marital status:      Spouse name: Not on file    Number of children: Not on file    Years of education: Not on file    Highest education level: Not on file   Occupational History    Not on file   Social Needs    Financial resource strain: Not on file    Food insecurity     Worry: Not on file     Inability: Not on file    Transportation needs     Medical: Not on file     Non-medical: Not on file   Tobacco Use    Smoking status: Never Smoker    Smokeless tobacco: Never Used   Substance and Sexual Activity    Alcohol use: Yes     Comment: occ    Drug use: No    Sexual activity: Never   Lifestyle    Physical activity     Days per week: Not on file     Minutes per session: Not on file  Stress: Not on file   Relationships    Social connections     Talks on phone: Not on file     Gets together: Not on file     Attends [employer] service: Not on file     Active member of club or organization: Not on file     Attends meetings of clubs or organizations: Not on file     Relationship status: Not on file    Intimate partner violence     Fear of current or ex partner: Not on file     Emotionally abused: Not on file     Physically abused: Not on file     Forced sexual activity: Not on file   Other Topics Concern    Not on file   Social History Narrative    Not on file         ALLERGIES: Patient has no known allergies. Review of Systems   Constitutional: Positive for fatigue and fever. Negative for chills. HENT: Negative for congestion and dental problem. Eyes: Negative for photophobia and redness. Respiratory: Negative for cough and chest tightness. Cardiovascular: Negative for chest pain. Gastrointestinal: Negative for abdominal pain and anal bleeding. Genitourinary: Negative for discharge and flank pain. Musculoskeletal: Positive for myalgias. Skin: Negative for color change and pallor. Neurological: Positive for weakness. Negative for light-headedness. Hematological: Negative for adenopathy. Does not bruise/bleed easily. All other systems reviewed and are negative. Vitals:    09/07/20 1852   BP: 107/74   Pulse: (!) 108   Resp: 20   Temp: (!) 103.1 °F (39.5 °C)   SpO2: 97%   Weight: 88.5 kg (195 lb)   Height: 5' 6\" (1.676 m)            Physical Exam  Vitals signs and nursing note reviewed. Constitutional:       Appearance: Normal appearance. He is ill-appearing. HENT:      Head: Normocephalic and atraumatic. Mouth/Throat:      Mouth: Mucous membranes are moist.   Eyes:      Extraocular Movements: Extraocular movements intact. Conjunctiva/sclera: Conjunctivae normal.      Pupils: Pupils are equal, round, and reactive to light.    Neck: Musculoskeletal: Normal range of motion and neck supple. Cardiovascular:      Rate and Rhythm: Regular rhythm. Tachycardia present. Pulses: Normal pulses. Heart sounds: Normal heart sounds. Pulmonary:      Effort: Pulmonary effort is normal.      Breath sounds: Normal breath sounds. Abdominal:      General: Abdomen is flat. Bowel sounds are normal.   Musculoskeletal: Normal range of motion. General: No swelling or tenderness. Skin:     General: Skin is warm and dry. Capillary Refill: Capillary refill takes less than 2 seconds. Neurological:      General: No focal deficit present. Mental Status: He is alert. MDM  Number of Diagnoses or Management Options  Suspected COVID-19 virus infection:   Diagnosis management comments: Concern For sepsis versus viral syndrome      9:40 PM  Normal white blood cell count. Chemistry panel stable. Negative lactic acid. X-ray shows possible left lower lobe infiltrate. Treated here with Rocephin and azithromycin. Awaiting results of urinalysis    10:02 PM  Vital signs stable. Plan to discharge home. Given COVID-19 precautions.  Normal urinalysis       Amount and/or Complexity of Data Reviewed  Clinical lab tests: ordered and reviewed  Tests in the radiology section of CPT®: ordered and reviewed    Risk of Complications, Morbidity, and/or Mortality  Presenting problems: high  Diagnostic procedures: moderate  Management options: high    Patient Progress  Patient progress: stable         Procedures               Results Include:    Recent Results (from the past 24 hour(s))   CBC WITH AUTOMATED DIFF    Collection Time: 09/07/20  7:04 PM   Result Value Ref Range    WBC 4.9 4.3 - 11.1 K/uL    RBC 4.41 4.23 - 5.6 M/uL    HGB 13.5 (L) 13.6 - 17.2 g/dL    HCT 40.5 (L) 41.1 - 50.3 %    MCV 91.8 79.6 - 97.8 FL    MCH 30.6 26.1 - 32.9 PG    MCHC 33.3 31.4 - 35.0 g/dL    RDW 13.4 11.9 - 14.6 %    PLATELET 187 635 - 814 K/uL    MPV 10.1 9.4 - 12.3 FL    ABSOLUTE NRBC 0.00 0.0 - 0.2 K/uL    DF AUTOMATED      NEUTROPHILS 73 43 - 78 %    LYMPHOCYTES 16 13 - 44 %    MONOCYTES 10 4.0 - 12.0 %    EOSINOPHILS 1 0.5 - 7.8 %    BASOPHILS 0 0.0 - 2.0 %    IMMATURE GRANULOCYTES 0 0.0 - 5.0 %    ABS. NEUTROPHILS 3.6 1.7 - 8.2 K/UL    ABS. LYMPHOCYTES 0.8 0.5 - 4.6 K/UL    ABS. MONOCYTES 0.5 0.1 - 1.3 K/UL    ABS. EOSINOPHILS 0.0 0.0 - 0.8 K/UL    ABS. BASOPHILS 0.0 0.0 - 0.2 K/UL    ABS. IMM. GRANS. 0.0 0.0 - 0.5 K/UL   METABOLIC PANEL, COMPREHENSIVE    Collection Time: 09/07/20  7:04 PM   Result Value Ref Range    Sodium 140 136 - 145 mmol/L    Potassium 3.9 3.5 - 5.1 mmol/L    Chloride 109 (H) 98 - 107 mmol/L    CO2 24 21 - 32 mmol/L    Anion gap 7 7 - 16 mmol/L    Glucose 122 (H) 65 - 100 mg/dL    BUN 10 6 - 23 MG/DL    Creatinine 1.02 0.8 - 1.5 MG/DL    GFR est AA >60 >60 ml/min/1.73m2    GFR est non-AA >60 >60 ml/min/1.73m2    Calcium 8.7 8.3 - 10.4 MG/DL    Bilirubin, total 0.3 0.2 - 1.1 MG/DL    ALT (SGPT) 89 (H) 12 - 65 U/L    AST (SGOT) 40 (H) 15 - 37 U/L    Alk. phosphatase 96 50 - 136 U/L    Protein, total 7.6 6.3 - 8.2 g/dL    Albumin 3.6 3.5 - 5.0 g/dL    Globulin 4.0 (H) 2.3 - 3.5 g/dL    A-G Ratio 0.9 (L) 1.2 - 3.5     LACTIC ACID    Collection Time: 09/07/20  7:04 PM   Result Value Ref Range    Lactic acid 1.8 0.4 - 2.0 MMOL/L   EKG, 12 LEAD, INITIAL    Collection Time: 09/07/20  7:08 PM   Result Value Ref Range    Ventricular Rate 73 BPM    Atrial Rate 73 BPM    P-R Interval 176 ms    QRS Duration 98 ms    Q-T Interval 346 ms    QTC Calculation (Bezet) 381 ms    Calculated P Axis 35 degrees    Calculated R Axis 26 degrees    Calculated T Axis 40 degrees    Diagnosis       !! AGE AND GENDER SPECIFIC ECG ANALYSIS !! Normal sinus rhythm  Normal ECG       Voice dictation software was used during the making of this note. This software is not perfect and grammatical and other typographical errors may be present.   This note has been proofread, but may still contain errors. Cornia Nunn MD; 9/7/2020 @9:40 PM   ===================================================================    I wore appropriate PPE throughout this patient's ED visit.  Corina Nunn MD, 9:40 PM

## 2020-09-07 NOTE — PROGRESS NOTES
called unit secretary desk to offer interpreting services to patient and staff. [facility] staff  available from 2:30 p.m. - 11:00 p.m. Please call Cheri at [phone] with any interpreting requests.         [address] [facility]  Language Services Department  [address]  [address]  [phone] (phone)

## 2020-09-07 NOTE — ED TRIAGE NOTES
Pt ambulatory to triage with mask in place. Pt reports fever of 101 and 102 starting Saturday. Pt reports mild cough. Pt report he was tested for COVID at  on Saturday but has not received his results. Pt reports his son was dx with COVID last week.

## 2020-09-08 ENCOUNTER — HOSPITAL ENCOUNTER (EMERGENCY)
Age: 50
Discharge: HOME | End: 2020-09-08
Attending: EMERGENCY MEDICINE
Payer: COMMERCIAL

## 2020-09-08 VITALS
RESPIRATION RATE: 18 BRPM | TEMPERATURE: 100 F | OXYGEN SATURATION: 96 % | BODY MASS INDEX: 31.34 KG/M2 | SYSTOLIC BLOOD PRESSURE: 124 MMHG | HEIGHT: 66 IN | WEIGHT: 195 LBS | DIASTOLIC BLOOD PRESSURE: 68 MMHG | HEART RATE: 85 BPM

## 2020-09-08 DIAGNOSIS — Z20.822 SUSPECTED COVID-19 VIRUS INFECTION: ICD-10-CM

## 2020-09-08 DIAGNOSIS — J18.9 PNEUMONIA OF LEFT LOWER LOBE DUE TO INFECTIOUS ORGANISM: Primary | ICD-10-CM

## 2020-09-08 LAB
ATRIAL RATE: 73 BPM
CALCULATED P AXIS, ECG09: 35 DEGREES
CALCULATED R AXIS, ECG10: 26 DEGREES
CALCULATED T AXIS, ECG11: 40 DEGREES
DIAGNOSIS, 93000: NORMAL
P-R INTERVAL, ECG05: 176 MS
Q-T INTERVAL, ECG07: 346 MS
QRS DURATION, ECG06: 98 MS
QTC CALCULATION (BEZET), ECG08: 381 MS
SARS COV-2, XPGCVT: POSITIVE
SOURCE, COVRS: ABNORMAL
VENTRICULAR RATE, ECG03: 73 BPM

## 2020-09-08 PROCEDURE — 99283 EMERGENCY DEPT VISIT LOW MDM: CPT

## 2020-09-08 PROCEDURE — 74011250637 HC RX REV CODE- 250/637: Performed by: EMERGENCY MEDICINE

## 2020-09-08 RX ORDER — DEXAMETHASONE SODIUM PHOSPHATE 100 MG/10ML
10 INJECTION INTRAMUSCULAR; INTRAVENOUS
Status: COMPLETED | OUTPATIENT
Start: 2020-09-08 | End: 2020-09-08

## 2020-09-08 RX ADMIN — DEXAMETHASONE SODIUM PHOSPHATE 10 MG: 10 INJECTION INTRAMUSCULAR; INTRAVENOUS at 18:38

## 2020-09-08 NOTE — ED NOTES
I have reviewed discharge instructions with the patient. instructed on self quarantine for 14 days from onset of symptoms for possible Coronavirus. The patient verbalized understanding. Patient left ED via private vehicle. Discharge Method: ambulatory to Home with family. Opportunity for questions and clarification provided. Patient given 0 scripts. To continue your aftercare when you leave the hospital, you may receive an automated call from our care team to check in on how you are doing. This is a free service and part of our promise to provide the best care and service to meet your aftercare needs.  If you have questions, or wish to unsubscribe from this service please call [phone]. Thank you for Choosing our [facility] Emergency Department.

## 2020-09-08 NOTE — DISCHARGE INSTRUCTIONS
Take Tylenol 1000 mg or ibuprofen 800 mg every 4-6 hours as needed for fevers or body aches  Drink plenty of fluids  Take medications as prescribed  Follow-up with your primary care physician  You will need to self quarantine until the results of your COVID-19 tests are known  Return to the ER for any new, worsening or life-threatening symptoms    Infecciones virales: Instrucciones de cuidado  Viral Infections: Care Instructions  Instrucciones de cuidado    Usted no se siente maria elena, emani no está mayo qué lo está causando. Podría tener essence infección viral. Los virus provocan muchas enfermedades, maria eugenia el resfriado común, influenza, fiebre, salpullidos, y la diarrea, las náuseas y el vómito que suelen llamarse \"gastroenteritis viral\". Es posible que se pregunte si los medicamentos antibióticos pueden ayudarle a sentirse mejor. Emani los antibióticos tratan únicamente infecciones causadas por bacterias. No funcionan para los virus. La buena noticia es que las infecciones virales generalmente no son graves. La mayoría desaparecen en unos pocos días sin tratamiento médico. Mientras tanto, hay algunas cosas que usted puede hacer para estar más cómodo. La atención de seguimiento es essence parte clave de reno tratamiento y seguridad. Asegúrese de hacer y acudir a todas las citas, y llame a reno médico si está teniendo problemas. También es essence buena idea saber los resultados de reinaldo exámenes y mantener essence lista de los medicamentos que janell. ¿Cómo puede cuidarse en el hogar? · Descanse lo suficiente si se siente agotado. · [place] un analgésico (medicamento para el dolor) de venta rere, maria eugenia acetaminofén (Tylenol), ibuprofeno (Advil, Motrin) o naproxeno (Aleve), si es necesario. Natalia y siga todas las instrucciones de la [place]. · Tenga cuidado cuando tome medicamentos de venta rere para el resfriado o la gripe y Tylenol al [employer]. Muchos de estos medicamentos contienen acetaminofén, o sea, Tylenol.  [address] para asegurarse de que no esté tomando essence dosis mayor que la recomendada. El exceso de acetaminofén (Tylenol) puede ser dañino. · Jossie abundantes líquidos, suficientes para que reno orina sea de color amarillo mayo o pj maria eugenia el agua. Si tiene essence enfermedad del riñón, del corazón o del hígado y tiene que Jessica's líquidos, hable con reno médico antes de aumentar reno consumo. · Cuando tenga fiebre, no vaya al [facility], a la escuela ni a otros lugares públicos. ¿Cuándo debe pedir ayuda? Llame al 911 en cualquier momento que considere que necesita atención de emergencia. Por ejemplo, llame si:    · Tiene dificultad grave para respirar.     · Se desmayó (perdió el conocimiento). Llame a reno médico ahora mismo o busque atención médica inmediata si:    · Le parece que está mucho más enfermo.     · Tiene fiebre nueva o más kristen.     · Tiene addie en las heces.     · Tiene dolor de estómago nuevo o que empeora.     · Tiene un nuevo salpullido. Preste especial atención a los cambios en reno jevon y asegúrese de comunicarse con reno médico si:    · Sonali Pod a mejorar y luego empeora.     · No mejora maria eugenia se esperaba. ¿Dónde puede encontrar más información en inglés? Vaya a http://bianka-fabrice.info/  Tita [id number] en la búsqueda para aprender más acerca de \"Infecciones virales: Instrucciones de cuidado. \"  Revisado: 11 febrero, 2020               Versión del contenido: 12.6  © 3836-6606 [employer], Incorporated. Las instrucciones de cuidado fueron adaptadas bajo licencia por [employer] (which disclaims liability or warranty for this information). Si usted tiene [place] afección médica o sobre estas instrucciones, siempre pregunte a reno profesional de jevon. [facility] niega toda garantía o responsabilidad por reno uso de esta información.          CORONAVIRUS DISCHARGE INSTRUCTIONS    Prevention steps for:  People with confirmed or suspected COVID-19  who are able to go home    Your healthcare provider and public health staff will evaluate whether you can be cared for at home. If it is determined that you do not need to be hospitalized and can be isolated at home, you will be monitored by staff from your local or state health department. You should follow the prevention steps below until a healthcare provider or local or state health department says you can return to your normal activities. Stay home except to get medical care  [address] who are mildly ill with COVID-19 are able to isolate at home during their illness. You should restrict activities outside your home, except for getting medical care. Do not go to work, school, or public areas. Avoid using public transportation, ride-sharing, or taxis. Separate yourself from other people and animals in your home   People: As much as possible, you should stay in a specific room and away from other people in your home. Also, you should use a separate bathroom, if available.  Animals: You should restrict contact with pets and other animals while you are sick with COVID-19, just like you would around other people. Although there have not been reports of pets or other animals becoming sick with COVID-19, it is still recommended that people sick with COVID-19 limit contact with animals until more information is known about the virus. When possible, have another member of your household care for your animals while you are sick. If you are sick with COVID-19, avoid contact with your pet, including petting, snuggling, being kissed or licked, and sharing food. If you must care for your pet or be around animals while you are sick, wash your hands before and after you interact with pets and wear a facemask. Call ahead before visiting your doctor   If you have a medical appointment, call the healthcare provider and tell them that you have or may have COVID-19.  This will help the healthcare provider's office take steps to keep other people from getting infected or exposed.  Put on a facemask before you enter the facility. These steps will help the healthcare provider's office to keep other people in the office or waiting room from getting infected or exposed. Ask your healthcare provider to call the local or [facility] health department. Persons who are placed under active monitoring or facilitated self-monitoring should follow instructions provided by their local health department or occupational health professionals, as appropriate. When working with your local health department check their available hours. Limiting Spread   You should wear a facemask when you are around other people (e.g., sharing a room or vehicle) or pets and before you enter a healthcare provider's office. If you are not able to wear a facemask (for example, because it causes trouble breathing), then people who live with you should not stay in the same room with you, or they should wear a facemask if they enter your room.  Cover your coughs and sneezes   Cover your mouth and nose with a tissue when you cough or sneeze. Throw used tissues in a lined trash can. Immediately wash your hands with soap and water for at least 20 seconds or, if soap and water are not available, clean your hands with an alcohol-based hand  that contains at least 60% alcohol.  Clean your hands often  [facility] your hands often with soap and water for at least 20 seconds, especially after blowing your nose, coughing, or sneezing; going to the bathroom; and before eating or preparing food. If soap and water are not readily available, use an alcohol-based hand  with at least 60% alcohol, covering all surfaces of your hands and rubbing them together until they feel dry.  Soap and water are the best option if hands are visibly dirty. Avoid touching your eyes, nose, and mouth with unwashed hands.    Avoid sharing personal household items   You should not share dishes, drinking glasses, cups, eating utensils, towels, or bedding with other people or pets in your home. After using these items, they should be washed thoroughly with soap and water.  Clean all high-touch surfaces everyday   High touch surfaces include counters, tabletops, doorknobs, bathroom fixtures, toilets, phones, keyboards, tablets, and bedside tables. o Also, clean any surfaces that may have blood, stool, or body fluids on them. Use a household cleaning spray or wipe, according to the label instructions. o Labels contain instructions for safe and effective use of the cleaning product including precautions you should take when applying the product, such as wearing gloves and making sure you have good ventilation during use of the product. [address] thoroughly.  o Immediately remove and wash clothes or bedding that have blood, stool, or body fluids on them.  o Wear disposable gloves while handling soiled items and keep soiled items away from your body. Clean your hands (with soap and water or an alcohol-based hand ) immediately after removing your gloves. o Read and follow directions on labels of laundry or clothing items and detergent. In general, using a normal laundry detergent according to washing machine instructions and dry thoroughly using the warmest temperatures recommended on the clothing label. Monitor your symptoms   Seek prompt medical attention if your illness is worsening (e.g., difficulty breathing). If you have a medical emergency and need to call 911, notify the dispatch personnel that you have, or are being evaluated for COVID-19. If possible, put on a facemask before emergency medical services arrive. Discontinuing home isolation   Patients with confirmed COVID-19 should remain under home isolation precautions until the risk of secondary transmission to others is thought to be low.  The decision to discontinue home isolation precautions should be made on a case-by-case basis, in consultation with healthcare providers and [facility] and local health departments. RECOMMENDATIONS FOR CAREGIVERS/ FAMILY MEMBERS   Make sure that you understand and can help the patient follow their healthcare provider's instructions for medication(s) and care. You should help the patient with basic needs in the home and provide support for getting groceries, prescriptions, and other personal needs.  Monitor the patient's symptoms. If the patient is getting sicker, call his or her healthcare provider and tell them that the patient has laboratory-confirmed COVID-19. This will help the healthcare provider's office take steps to keep other people in the office or waiting room from getting infected. Ask the healthcare provider to call the local or [facility] health department for additional guidance. If the patient has a medical emergency and you need to call 911, notify the dispatch personnel that the patient has, or is being evaluated for COVID-19.   Household members should stay in another room or be  from the patient as much as possible. Household members should use a separate bedroom and bathroom, if available.  Prohibit visitors who do not have an essential need to be in the home.  Household members should care for any pets in the home. Do not handle pets or other animals while sick. For more information, see COVID-19 and Animals.  Make sure that shared spaces in the home have good air flow, such as by an air conditioner or an opened window, weather permitting.  Perform hand hygiene frequently. Wash your hands often with soap and water for at least 20 seconds or use an alcohol-based hand  that contains 60 to 95% alcohol, covering all surfaces of your hands and rubbing them together until they feel dry. Soap and water should be used preferentially if hands are visibly dirty.  Avoid touching your eyes, nose, and mouth with unwashed hands.    The patient should wear a facemask when you are around other people. If the patient is not able to wear a facemask (for example, because it causes trouble breathing), you, as the caregiver, should wear a mask when you are in the same room as the patient.  Wear a disposable facemask and gloves when you touch or have contact with the patient's blood, stool, or body fluids, such as saliva, sputum, nasal mucus, vomit, urine. o Throw out disposable facemasks and gloves after using them. Do not reuse. o When removing personal protective equipment, first remove and dispose of gloves. Then, immediately clean your hands with soap and water or alcohol-based hand . Next, remove and dispose of facemask, and immediately clean your hands again with soap and water or alcohol-based hand .  Avoid sharing household items with the patient. You should not share dishes, drinking glasses, cups, eating utensils, towels, bedding, or other items. After the patient uses these items, you should wash them thoroughly (see below ?susan laundry thoroughly).

## 2020-09-08 NOTE — PROGRESS NOTES
called unit secretary desk to offer interpreting services to patient and staff. [address] staff  available from 3:30 p.m. - midnight. Please call Cheri at [phone] with any interpreting requests.       [address] [facility]  Language Services Department  [address]  [address]  [phone] (phone)

## 2020-09-08 NOTE — ED PROVIDER NOTES
Lyndsey Castillo is a 53 y.o. male seen on 9/8/2020 in the [address] [place] [address] in room IWR/IWR. Chief Complaint   Patient presents with    Fever     HPI:   60-year-old  male presented emergency department for reevaluation for continued fever, body aches and cough. Patient was seen in the emergency department yesterday for the same. Patient was diagnosed with a left lower lobe pneumonia and probable COVID-19 infection. Patient was discharged home on doxycycline. Patient states that he has taken 2 doses of his doxycycline and he is not feeling better so he presented back into the emergency department. Patient states that his fever returns every 4 hours or so. He denies any new symptoms. Historian: Patient    REVIEW OF SYSTEMS     Review of Systems   Constitutional: Positive for chills, fatigue and fever. HENT: Negative. Respiratory: Positive for cough and shortness of breath. Cardiovascular: Negative. Gastrointestinal: Negative. Genitourinary: Negative. Musculoskeletal: Negative. Skin: Negative. Neurological: Negative. Hematological: Negative. Psychiatric/Behavioral: Negative. All other systems reviewed and are negative. PAST MEDICAL HISTORY     Past Medical History:   Diagnosis Date    GERD (gastroesophageal reflux disease)     managed well with PRN OTC meds    Hypercholesteremia     no current meds at this time- attempted to manage with diet.     Right shoulder pain      Past Surgical History:   Procedure Laterality Date    HX KNEE ARTHROSCOPY Right     HX OTHER SURGICAL      eye surg as a child     Social History     Socioeconomic History    Marital status:      Spouse name: Not on file    Number of children: Not on file    Years of education: Not on file    Highest education level: Not on file   Tobacco Use    Smoking status: Never Smoker    Smokeless tobacco: Never Used   Substance and Sexual Activity    Alcohol use: Yes     Comment: occ    Drug use: No    Sexual activity: Never     Prior to Admission Medications   Prescriptions Last Dose Informant Patient Reported? Taking? albuterol (PROVENTIL HFA, VENTOLIN HFA, PROAIR HFA) 90 mcg/actuation inhaler   No No   Sig: Take 2 puffs by inhalation every four (4) hours as needed (for cough, use with spacer chamber please). ascorbic acid, vitamin C, (Vitamin C) 250 mg tablet   Yes No   Sig: Take 250 mg by mouth daily. cholecalciferol, vitamin D3, (VITAMIN D3 PO)   Yes No   Sig: Take 1 Tab by mouth daily. doxycycline (VIBRA-TABS) 100 mg tablet   No No   Sig: Take 1 Tab by mouth two (2) times a day for 7 days. meloxicam (Mobic) 7.5 mg tablet   Yes No   Sig: Take 7.5 mg by mouth daily. ondansetron (ZOFRAN ODT) 8 mg disintegrating tablet   No No   Sig: Take 1 Tab by mouth every eight (8) hours as needed for Nausea. Facility-Administered Medications: None     No Known Allergies     PHYSICAL EXAM       Vitals:    09/08/20 1657   BP: 124/68   Pulse: 85   Resp: 18   Temp: 100 °F (37.8 °C)   SpO2: 96%    Vital signs were reviewed. Physical Exam  Vitals signs and nursing note reviewed. Constitutional:       Appearance: Normal appearance. He is ill-appearing (Appears to not feel well). HENT:      Head: Atraumatic. Mouth/Throat:      Mouth: Mucous membranes are moist.   Eyes:      Extraocular Movements: Extraocular movements intact. Cardiovascular:      Rate and Rhythm: Normal rate and regular rhythm. Pulmonary:      Effort: Pulmonary effort is normal.      Breath sounds: Normal breath sounds. Abdominal:      General: Abdomen is flat. Palpations: Abdomen is soft. Musculoskeletal: Normal range of motion. Skin:     General: Skin is warm and dry. Neurological:      General: No focal deficit present. Mental Status: He is alert and oriented to person, place, and time.    Psychiatric:         Mood and Affect: Mood normal.         Behavior: Behavior normal.         Thought Content: Thought content normal.         Judgment: Judgment normal.          MEDICAL DECISION MAKING     ED Course:    No results found for this or any previous visit (from the past 8 hour(s)). Xr Chest Port    Result Date: 9/7/2020  Chest X-ray INDICATION: Shortness of breath, possible COVID-19 A portable AP view of the chest was obtained. FINDINGS: There is a questionable small area of infiltrate in the lateral left lung base. Right lung is clear. The heart size is normal.  The bony thorax is intact. IMPRESSION: Possible left lower lobe infiltrate       MDM  Number of Diagnoses or Management Options  Diagnosis management comments: 26-year-old  male presented emergency department with continued symptoms of cough, congestion, fevers consistent with his diagnosis of pneumonia. Patient is likely COVID positive as well. Patient encouraged to aggressively use Motrin and Tylenol for fever and pain control. Patient given dose of Decadron in the emergency department. He will take his Decadron. Return emergency department for any concerns. Amount and/or Complexity of Data Reviewed  Decide to obtain previous medical records or to obtain history from someone other than the patient: yes  Review and summarize past medical records: yes        Disposition: Discharged  Diagnosis:     ICD-10-CM ICD-9-CM   1. Pneumonia of left lower lobe due to infectious organism  J18.9 486   2. Suspected COVID-19 virus infection  Z20.828 V01.79     ____________________________________________________________________  A portion of this note was generated using voice recognition dictation software. While the note has been reviewed for accuracy, please note certain words and phrases may not be transcribed as intended and some grammatical and/or typographical errors may be present.

## 2020-09-08 NOTE — DISCHARGE INSTRUCTIONS
Take Motrin Tylenol as discussed. Continue your antibiotic. Your COVID test should come back tomorrow or the day after.

## 2020-09-08 NOTE — ED NOTES
I have reviewed discharge instructions with the patient. The patient verbalized understanding. Patient left ED via Discharge Method: ambulatory to Home with self    Opportunity for questions and clarification provided. Patient given 1 scripts. To continue your aftercare when you leave the hospital, you may receive an automated call from our care team to check in on how you are doing. This is a free service and part of our promise to provide the best care and service to meet your aftercare needs.  If you have questions, or wish to unsubscribe from this service please call [phone]. Thank you for Choosing our [facility] Emergency Department.

## 2020-09-09 ENCOUNTER — PATIENT OUTREACH (OUTPATIENT)
Dept: CASE MANAGEMENT | Age: 50
End: 2020-09-09

## 2020-09-09 NOTE — PROGRESS NOTES
09/09/20 Patient notified of positive Covid 19 result;  present on call; home care instructions discussed; questions answered

## 2020-09-11 ENCOUNTER — APPOINTMENT (OUTPATIENT)
Dept: GENERAL RADIOLOGY | Age: 50
DRG: 177 | End: 2020-09-11
Attending: EMERGENCY MEDICINE
Payer: COMMERCIAL

## 2020-09-11 ENCOUNTER — HOSPITAL ENCOUNTER (EMERGENCY)
Age: 50
Discharge: HOME | DRG: 177 | End: 2020-09-11
Attending: EMERGENCY MEDICINE
Payer: COMMERCIAL

## 2020-09-11 VITALS
WEIGHT: 190 LBS | SYSTOLIC BLOOD PRESSURE: 124 MMHG | BODY MASS INDEX: 30.53 KG/M2 | DIASTOLIC BLOOD PRESSURE: 71 MMHG | HEART RATE: 81 BPM | HEIGHT: 66 IN | RESPIRATION RATE: 18 BRPM | TEMPERATURE: 99.3 F | OXYGEN SATURATION: 94 %

## 2020-09-11 DIAGNOSIS — R11.2 NAUSEA AND VOMITING, INTRACTABILITY OF VOMITING NOT SPECIFIED, UNSPECIFIED VOMITING TYPE: ICD-10-CM

## 2020-09-11 DIAGNOSIS — U07.1 COVID-19: Primary | ICD-10-CM

## 2020-09-11 LAB
ALBUMIN SERPL-MCNC: 3.2 G/DL (ref 3.5–5)
ALBUMIN/GLOB SERPL: 0.8 {RATIO} (ref 1.2–3.5)
ALP SERPL-CCNC: 82 U/L (ref 50–136)
ALT SERPL-CCNC: 75 U/L (ref 12–65)
ANION GAP SERPL CALC-SCNC: 6 MMOL/L (ref 7–16)
APPEARANCE UR: CLEAR
AST SERPL-CCNC: 40 U/L (ref 15–37)
ATRIAL RATE: 83 BPM
BACTERIA URNS QL MICRO: 0 /HPF
BASOPHILS # BLD: 0 K/UL (ref 0–0.2)
BASOPHILS NFR BLD: 0 % (ref 0–2)
BILIRUB SERPL-MCNC: 0.4 MG/DL (ref 0.2–1.1)
BILIRUB UR QL: NEGATIVE
BUN SERPL-MCNC: 16 MG/DL (ref 6–23)
CALCIUM SERPL-MCNC: 8.5 MG/DL (ref 8.3–10.4)
CALCULATED P AXIS, ECG09: 25 DEGREES
CALCULATED R AXIS, ECG10: 8 DEGREES
CALCULATED T AXIS, ECG11: 38 DEGREES
CASTS URNS QL MICRO: ABNORMAL /LPF
CHLORIDE SERPL-SCNC: 105 MMOL/L (ref 98–107)
CO2 SERPL-SCNC: 27 MMOL/L (ref 21–32)
COLOR UR: ABNORMAL
CREAT SERPL-MCNC: 0.97 MG/DL (ref 0.8–1.5)
DIAGNOSIS, 93000: NORMAL
DIFFERENTIAL METHOD BLD: ABNORMAL
EOSINOPHIL # BLD: 0 K/UL (ref 0–0.8)
EOSINOPHIL NFR BLD: 0 % (ref 0.5–7.8)
EPI CELLS #/AREA URNS HPF: ABNORMAL /HPF
ERYTHROCYTE [DISTWIDTH] IN BLOOD BY AUTOMATED COUNT: 13.5 % (ref 11.9–14.6)
GLOBULIN SER CALC-MCNC: 4.1 G/DL (ref 2.3–3.5)
GLUCOSE SERPL-MCNC: 114 MG/DL (ref 65–100)
GLUCOSE UR STRIP.AUTO-MCNC: NEGATIVE MG/DL
HCT VFR BLD AUTO: 40.7 % (ref 41.1–50.3)
HGB BLD-MCNC: 13.4 G/DL (ref 13.6–17.2)
HGB UR QL STRIP: NEGATIVE
IMM GRANULOCYTES # BLD AUTO: 0 K/UL (ref 0–0.5)
IMM GRANULOCYTES NFR BLD AUTO: 0 % (ref 0–5)
KETONES UR QL STRIP.AUTO: NEGATIVE MG/DL
LACTATE SERPL-SCNC: 1.2 MMOL/L (ref 0.4–2)
LEUKOCYTE ESTERASE UR QL STRIP.AUTO: NEGATIVE
LYMPHOCYTES # BLD: 1 K/UL (ref 0.5–4.6)
LYMPHOCYTES NFR BLD: 17 % (ref 13–44)
MCH RBC QN AUTO: 30 PG (ref 26.1–32.9)
MCHC RBC AUTO-ENTMCNC: 32.9 G/DL (ref 31.4–35)
MCV RBC AUTO: 91.3 FL (ref 79.6–97.8)
MONOCYTES # BLD: 0.4 K/UL (ref 0.1–1.3)
MONOCYTES NFR BLD: 6 % (ref 4–12)
NEUTS SEG # BLD: 4.6 K/UL (ref 1.7–8.2)
NEUTS SEG NFR BLD: 77 % (ref 43–78)
NITRITE UR QL STRIP.AUTO: NEGATIVE
NRBC # BLD: 0 K/UL (ref 0–0.2)
P-R INTERVAL, ECG05: 172 MS
PH UR STRIP: 6 [PH] (ref 5–9)
PLATELET # BLD AUTO: 171 K/UL (ref 150–450)
PMV BLD AUTO: 10.1 FL (ref 9.4–12.3)
POTASSIUM SERPL-SCNC: 3.6 MMOL/L (ref 3.5–5.1)
PROT SERPL-MCNC: 7.3 G/DL (ref 6.3–8.2)
PROT UR STRIP-MCNC: 30 MG/DL
Q-T INTERVAL, ECG07: 346 MS
QRS DURATION, ECG06: 90 MS
QTC CALCULATION (BEZET), ECG08: 406 MS
RBC # BLD AUTO: 4.46 M/UL (ref 4.23–5.6)
RBC #/AREA URNS HPF: ABNORMAL /HPF
SODIUM SERPL-SCNC: 138 MMOL/L (ref 136–145)
SP GR UR REFRACTOMETRY: 1.03 (ref 1–1.02)
UROBILINOGEN UR QL STRIP.AUTO: 0.2 EU/DL (ref 0.2–1)
VENTRICULAR RATE, ECG03: 83 BPM
WBC # BLD AUTO: 6.1 K/UL (ref 4.3–11.1)
WBC URNS QL MICRO: ABNORMAL /HPF

## 2020-09-11 PROCEDURE — 81001 URINALYSIS AUTO W/SCOPE: CPT

## 2020-09-11 PROCEDURE — 99285 EMERGENCY DEPT VISIT HI MDM: CPT

## 2020-09-11 PROCEDURE — 83605 ASSAY OF LACTIC ACID: CPT

## 2020-09-11 PROCEDURE — 96375 TX/PRO/DX INJ NEW DRUG ADDON: CPT

## 2020-09-11 PROCEDURE — 85025 COMPLETE CBC W/AUTO DIFF WBC: CPT

## 2020-09-11 PROCEDURE — 74011000250 HC RX REV CODE- 250: Performed by: EMERGENCY MEDICINE

## 2020-09-11 PROCEDURE — 71045 X-RAY EXAM CHEST 1 VIEW: CPT

## 2020-09-11 PROCEDURE — 74011250636 HC RX REV CODE- 250/636: Performed by: EMERGENCY MEDICINE

## 2020-09-11 PROCEDURE — 96374 THER/PROPH/DIAG INJ IV PUSH: CPT

## 2020-09-11 PROCEDURE — 87040 BLOOD CULTURE FOR BACTERIA: CPT

## 2020-09-11 PROCEDURE — 80053 COMPREHEN METABOLIC PANEL: CPT

## 2020-09-11 PROCEDURE — 87086 URINE CULTURE/COLONY COUNT: CPT

## 2020-09-11 PROCEDURE — 93005 ELECTROCARDIOGRAM TRACING: CPT | Performed by: EMERGENCY MEDICINE

## 2020-09-11 RX ORDER — KETOROLAC TROMETHAMINE 30 MG/ML
30 INJECTION, SOLUTION INTRAMUSCULAR; INTRAVENOUS ONCE
Status: COMPLETED | OUTPATIENT
Start: 2020-09-11 | End: 2020-09-11

## 2020-09-11 RX ORDER — ONDANSETRON 4 MG/1
4 TABLET, ORALLY DISINTEGRATING ORAL
Qty: 12 TAB | Refills: 0 | Status: SHIPPED | OUTPATIENT
Start: 2020-09-11 | End: 2020-10-05

## 2020-09-11 RX ORDER — ALBUTEROL SULFATE 90 UG/1
2 AEROSOL, METERED RESPIRATORY (INHALATION)
Qty: 1 INHALER | Refills: 0 | Status: SHIPPED | OUTPATIENT
Start: 2020-09-11

## 2020-09-11 RX ORDER — ONDANSETRON 2 MG/ML
4 INJECTION INTRAMUSCULAR; INTRAVENOUS ONCE
Status: COMPLETED | OUTPATIENT
Start: 2020-09-11 | End: 2020-09-11

## 2020-09-11 RX ORDER — PROMETHAZINE HYDROCHLORIDE 25 MG/1
25 TABLET ORAL
Qty: 12 TAB | Refills: 0 | Status: SHIPPED | OUTPATIENT
Start: 2020-09-11 | End: 2020-10-05

## 2020-09-11 RX ADMIN — ONDANSETRON 4 MG: 2 INJECTION INTRAMUSCULAR; INTRAVENOUS at 12:41

## 2020-09-11 RX ADMIN — FAMOTIDINE 40 MG: 10 INJECTION INTRAVENOUS at 12:41

## 2020-09-11 RX ADMIN — KETOROLAC TROMETHAMINE 30 MG: 30 INJECTION, SOLUTION INTRAMUSCULAR at 13:39

## 2020-09-11 RX ADMIN — SODIUM CHLORIDE 1000 ML: 900 INJECTION, SOLUTION INTRAVENOUS at 12:41

## 2020-09-11 RX ADMIN — SODIUM CHLORIDE 1000 ML: 900 INJECTION, SOLUTION INTRAVENOUS at 10:51

## 2020-09-11 NOTE — DISCHARGE INSTRUCTIONS
There still appears to be no reason to keep you in the hospital.  Your vital signs have normalized, blood work is normal and you are not requiring oxygen. Sending you home with asthma inhaler, 2 different types of nausea medication.

## 2020-09-11 NOTE — PROGRESS NOTES
Patient contacted regarding recent visit for viral symptoms. This [place] contacted the family by telephone to perform post discharge call. Verified name and  with family as identifiers. Provided introduction to self, and reason for call due to viral symptoms of infection and/or exposure to COVID-19. Discussed COVID-19 related testing which was previously done at this time. Test results were positive. Patient informed of results, if available? Previously done    Advance Care Planning:   Does patient have an Advance Directive: Reviewed and current    Patient presented to emergency department/flu clinic with complaints of viral symptoms/exposure to COVID. Patient reports symptoms are unchanged. Due to no new or worsening symptoms the RN CTN/ACM was not notified for escalation. Discussed exposure protocols and quarantine with CDC Guidelines What To Do If Melinda Desai    family was given an opportunity for questions and concerns. Stay home except to get medical care  Separate yourself from other people and animals in your home  Call ahead before visiting your doctor  Wear a facemask  Cover your coughs and sneezes  Clean your hands often  Avoid sharing personal household items  Clean all high-touch surfaces everyday    Monitor your symptoms  Seek prompt medical attention if your illness is worsening (e.g., difficulty breathing). Before seeking care, call your healthcare provider and tell them that you have, or are being evaluated for, COVID-19. Put on a facemask before you enter the facility. These steps will help the healthcare provider's office to keep other people in the office or waiting room from getting infected or exposed. Ask your healthcare provider to call the local or state health department. Persons who are placed under If you have a medical emergency and need to call 911, notify the dispatch personnel that you have, or are being evaluated for COVID-19.  If possible, put on a facemask before emergency medical services arrive. The family agrees to contact the [facility] exposure line [phone], local health department [facility] and PCP office for questions related to their healthcare. Author provided contact information for future reference.     Patient/family/caregiver given information for [employer] and agrees to enroll No

## 2020-09-11 NOTE — ED PROVIDER NOTES
25-year-old male diagnosed with COVID-19 6 days ago presenting for worsening symptoms. Patient complains of vomiting. Weakness. Poor appetite. No diarrhea. No pain. He has some shortness of breath upon exertion but not while resting. Wife also diagnosed with COVID-19 but symptoms are not quite as severe yet. Patient has no other medical problems. Diagnosed with \"pneumonia\" the other day and started on antibiotics and he is finished those. The history is provided by the patient. Cough   This is a new problem. The current episode started more than 1 week ago. The problem occurs constantly. The problem has been gradually worsening. The cough is non-productive. There has been a fever of 102 - 102.9 F. The fever has been present for 3 - 4 days. Associated symptoms include chills, sweats, ear congestion, myalgias, shortness of breath, nausea and vomiting. Pertinent negatives include no chest pain, no weight loss, no eye redness, no ear pain, no headaches, no rhinorrhea, no sore throat, no wheezing and no confusion. He has tried cough syrup and antibiotics for the symptoms. The treatment provided no relief. His past medical history is significant for pneumonia. Past Medical History:   Diagnosis Date    GERD (gastroesophageal reflux disease)     managed well with PRN OTC meds    Hypercholesteremia     no current meds at this time- attempted to manage with diet.     Right shoulder pain        Past Surgical History:   Procedure Laterality Date    HX KNEE ARTHROSCOPY Right     HX OTHER SURGICAL      eye surg as a child         Family History:   Problem Relation Age of Onset    Anemia Mother     Hypertension Father     Parkinson's Disease Father        Social History     Socioeconomic History    Marital status:      Spouse name: Not on file    Number of children: Not on file    Years of education: Not on file    Highest education level: Not on file   Occupational History    Not on file Social Needs    Financial resource strain: Not on file    Food insecurity     Worry: Not on file     Inability: Not on file    Transportation needs     Medical: Not on file     Non-medical: Not on file   Tobacco Use    Smoking status: Never Smoker    Smokeless tobacco: Never Used   Substance and Sexual Activity    Alcohol use: Yes     Comment: occ    Drug use: No    Sexual activity: Never   Lifestyle    Physical activity     Days per week: Not on file     Minutes per session: Not on file    Stress: Not on file   Relationships    Social connections     Talks on phone: Not on file     Gets together: Not on file     Attends [employer] service: Not on file     Active member of club or organization: Not on file     Attends meetings of clubs or organizations: Not on file     Relationship status: Not on file    Intimate partner violence     Fear of current or ex partner: Not on file     Emotionally abused: Not on file     Physically abused: Not on file     Forced sexual activity: Not on file   Other Topics Concern    Not on file   Social History Narrative    Not on file         ALLERGIES: Patient has no known allergies. Review of Systems   Constitutional: Positive for chills. Negative for weight loss. HENT: Negative for ear pain, rhinorrhea and sore throat. Eyes: Negative for redness. Respiratory: Positive for cough and shortness of breath. Negative for wheezing. Cardiovascular: Negative for chest pain. Gastrointestinal: Positive for nausea and vomiting. Musculoskeletal: Positive for myalgias. Neurological: Negative for headaches. Psychiatric/Behavioral: Negative for confusion. All other systems reviewed and are negative.       Vitals:    09/11/20 1014 09/11/20 1051 09/11/20 1100   BP: (!) 80/49 119/63 120/65   Pulse: 70 85 80   Resp: 18     Temp: 99.3 °F (37.4 °C)     SpO2: 94% 100% 100%   Weight: 86.2 kg (190 lb)     Height: 5' 6\" (1.676 m)              Physical Exam  Vitals signs and nursing note reviewed. Constitutional:       Appearance: He is well-developed. Comments: Appears fatigued but no acute distress   HENT:      Head: Normocephalic and atraumatic. Eyes:      Conjunctiva/sclera: Conjunctivae normal.      Pupils: Pupils are equal, round, and reactive to light. Neck:      Musculoskeletal: Normal range of motion and neck supple. Cardiovascular:      Rate and Rhythm: Normal rate and regular rhythm. Heart sounds: Normal heart sounds. Pulmonary:      Effort: Pulmonary effort is normal.      Breath sounds: Normal breath sounds. Abdominal:      General: Bowel sounds are normal.      Palpations: Abdomen is soft. Musculoskeletal: Normal range of motion. General: No deformity. Skin:     General: Skin is warm and dry. Neurological:      Mental Status: He is alert and oriented to person, place, and time. Cranial Nerves: No cranial nerve deficit. Psychiatric:         Behavior: Behavior normal.          MDM  Number of Diagnoses or Management Options  COVID-19:   Nausea and vomiting, intractability of vomiting not specified, unspecified vomiting type:   Diagnosis management comments: 56-year-old male with no significant past medical history presenting for worsening symptoms in the setting of COVID-19 infection. Patient is hypotensive upon arrival so we will do a full sepsis work-up. Upon my evaluation of the patient in the room he appears in no acute distress, vital signs have normalized. Patient does admit to nausea vomiting and poor appetite and is 95% on room air which is reassuring as well.        Amount and/or Complexity of Data Reviewed  Clinical lab tests: ordered and reviewed (Results for orders placed or performed during the hospital encounter of 09/11/20  -CULTURE, BLOOD  Specimen: Blood       Result                      Value             Ref Range           Special Requests:           RIGHT HAND                            Culture result: PENDING                          -CBC WITH AUTOMATED DIFF       Result                      Value             Ref Range           WBC                         6.1               4.3 - 11.1 K*       RBC                         4.46              4.23 - 5.6 M*       HGB                         13.4 (L)          13.6 - 17.2 *       HCT                         40.7 (L)          41.1 - 50.3 %       MCV                         91.3              79.6 - 97.8 *       MCH                         30.0              26.1 - 32.9 *       MCHC                        32.9              31.4 - 35.0 *       RDW                         13.5              11.9 - 14.6 %       PLATELET                    171               150 - 450 K/*       MPV                         10.1              9.4 - 12.3 FL       ABSOLUTE NRBC               0.00              0.0 - 0.2 K/*       DF                          AUTOMATED                             NEUTROPHILS                 77                43 - 78 %           LYMPHOCYTES                 17                13 - 44 %           MONOCYTES                   6                 4.0 - 12.0 %        EOSINOPHILS                 0 (L)             0.5 - 7.8 %         BASOPHILS                   0                 0.0 - 2.0 %         IMMATURE GRANULOCYTES       0                 0.0 - 5.0 %         ABS. NEUTROPHILS            4.6               1.7 - 8.2 K/*       ABS. LYMPHOCYTES            1.0               0.5 - 4.6 K/*       ABS. MONOCYTES              0.4               0.1 - 1.3 K/*       ABS. EOSINOPHILS            0.0               0.0 - 0.8 K/*       ABS. BASOPHILS              0.0               0.0 - 0.2 K/*       ABS. IMM.  GRANS.            0.0               0.0 - 0.5 K/*  -METABOLIC PANEL, COMPREHENSIVE       Result                      Value             Ref Range           Sodium                      138               136 - 145 mm*       Potassium                   3.6               3.5 - 5.1 mm* Chloride                    105               98 - 107 mmo*       CO2                         27                21 - 32 mmol*       Anion gap                   6 (L)             7 - 16 mmol/L       Glucose                     114 (H)           65 - 100 mg/*       BUN                         16                6 - 23 MG/DL        Creatinine                  0.97              0.8 - 1.5 MG*       GFR est AA                  >60               >60 ml/min/1*       GFR est non-AA              >60               >60 ml/min/1*       Calcium                     8.5               8.3 - 10.4 M*       Bilirubin, total            0.4               0.2 - 1.1 MG*       ALT (SGPT)                  75 (H)            12 - 65 U/L         AST (SGOT)                  40 (H)            15 - 37 U/L         Alk. phosphatase            82                50 - 136 U/L        Protein, total              7.3               6.3 - 8.2 g/*       Albumin                     3.2 (L)           3.5 - 5.0 g/*       Globulin                    4.1 (H)           2.3 - 3.5 g/*       A-G Ratio                   0.8 (L)           1.2 - 3.5      -LACTIC ACID       Result                      Value             Ref Range           Lactic acid                 1.2               0.4 - 2.0 MM*  )  Tests in the radiology section of CPT®: ordered and reviewed (Xr Chest Port    Result Date: 9/11/2020  CHEST ONE VIEW HISTORY: Shortness of breath. Worsening  COVID-19 symptoms. COMPARISON: September 7, 2020 FINDINGS: The lung volumes are diminished. Interstitial densities are present in the periphery of both middle and lower lung zones. Pleural spaces are clear. IMPRESSION: Low lung volumes with middle and lower lung zone peripheral interstitial opacities. This pattern is consistent with a viral pneumonia. Xr Chest Port    Result Date: 9/7/2020  Chest X-ray INDICATION: Shortness of breath, possible COVID-19 A portable AP view of the chest was obtained.  FINDINGS: There is a questionable small area of infiltrate in the lateral left lung base. Right lung is clear. The heart size is normal.  The bony thorax is intact. IMPRESSION: Possible left lower lobe infiltrate     )  Tests in the medicine section of CPT®: ordered and reviewed  Decide to obtain previous medical records or to obtain history from someone other than the patient: yes  Independent visualization of images, tracings, or specimens: yes    Risk of Complications, Morbidity, and/or Mortality  Presenting problems: high  Diagnostic procedures: high  Management options: moderate  General comments: Patient has remained hemodynamically stable after the initial hypotension. Heart rate is stable, lactic acid is normal, blood work is unremarkable. Chest x-ray is as expected but the patient does not require any supplemental oxygen. Discharging with Phenergan, Zofran, asthma inhaler with clear instructions that his symptoms could continue to worsen over the next few days and he may need hospitalization then but does not need it today. I personally reviewed the patient's vital signs, laboratory tests, and/or radiological findings. I discussed these findings with the patient and their significance. I answered all questions and gave the patient clear return precautions. The patient was discharged from the emergency department in stable condition        Patient Progress  Patient progress: improved    ED Course as of Sep 11 1309   Fri Sep 11, 2020   1058 Chest x-ray consistent with COVID-19 pneumonia. [JS]   1147 EKG performed and reviewed by me shows a sinus rhythm 83, normal axis, normal intervals, no acute ischemic change    [JS]   1218 Blood work unremarkable. Slightly elevated liver enzymes that are actually improved from several days ago. [JS]   6708 Vital signs have normalized.     [JS]      ED Course User Index  [JS] Va Gifford MD       Procedures

## 2020-09-11 NOTE — ED NOTES
I have reviewed discharge instructions with the patient. The patient verbalized understanding. Patient left ED via Discharge Method: ambulatory to Home with spouse    Opportunity for questions and clarification provided. Patient given 3 scripts. No esign        To continue your aftercare when you leave the hospital, you may receive an automated call from our care team to check in on how you are doing. This is a free service and part of our promise to provide the best care and service to meet your aftercare needs.  If you have questions, or wish to unsubscribe from this service please call [phone]. Thank you for Choosing our [facility].

## 2020-09-11 NOTE — ED TRIAGE NOTES
Pt coming in for worsening covid symptoms. Pt states he was diagnosed last Saturday with Covid and pneumonia. States he was given antibiotics and finished them. States 2 days ago he started vomiting and hasnt been able to keep anything down. Has had a fever of a 102.0  Was given 1000 tylenol by wife. Pt states he still feels SHOB and has aches all over.  Pt came in with mask

## 2020-09-12 ENCOUNTER — PATIENT OUTREACH (OUTPATIENT)
Dept: CASE MANAGEMENT | Age: 50
End: 2020-09-12

## 2020-09-12 ENCOUNTER — APPOINTMENT (OUTPATIENT)
Dept: GENERAL RADIOLOGY | Age: 50
DRG: 177 | End: 2020-09-12
Attending: EMERGENCY MEDICINE
Payer: COMMERCIAL

## 2020-09-12 ENCOUNTER — HOSPITAL ENCOUNTER (INPATIENT)
Age: 50
LOS: 6 days | Discharge: HOME | DRG: 177 | End: 2020-09-18
Attending: EMERGENCY MEDICINE | Admitting: FAMILY MEDICINE
Payer: COMMERCIAL

## 2020-09-12 DIAGNOSIS — U07.1 COVID-19: Primary | ICD-10-CM

## 2020-09-12 DIAGNOSIS — R09.02 HYPOXIA: ICD-10-CM

## 2020-09-12 DIAGNOSIS — J96.01 ACUTE RESPIRATORY FAILURE WITH HYPOXIA (HCC): ICD-10-CM

## 2020-09-12 DIAGNOSIS — R06.02 SOB (SHORTNESS OF BREATH): ICD-10-CM

## 2020-09-12 DIAGNOSIS — R00.1 SINUS BRADYCARDIA: ICD-10-CM

## 2020-09-12 LAB
ALBUMIN SERPL-MCNC: 3 G/DL (ref 3.5–5)
ALBUMIN/GLOB SERPL: 0.7 {RATIO} (ref 1.2–3.5)
ALP SERPL-CCNC: 101 U/L (ref 50–136)
ALT SERPL-CCNC: 82 U/L (ref 12–65)
ANION GAP SERPL CALC-SCNC: 5 MMOL/L (ref 7–16)
AST SERPL-CCNC: 55 U/L (ref 15–37)
BACTERIA SPEC CULT: NORMAL
BACTERIA SPEC CULT: NORMAL
BASOPHILS # BLD: 0 K/UL (ref 0–0.2)
BASOPHILS NFR BLD: 0 % (ref 0–2)
BILIRUB SERPL-MCNC: 0.3 MG/DL (ref 0.2–1.1)
BUN SERPL-MCNC: 8 MG/DL (ref 6–23)
CALCIUM SERPL-MCNC: 8.3 MG/DL (ref 8.3–10.4)
CHLORIDE SERPL-SCNC: 108 MMOL/L (ref 98–107)
CO2 SERPL-SCNC: 27 MMOL/L (ref 21–32)
CREAT SERPL-MCNC: 0.88 MG/DL (ref 0.8–1.5)
DIFFERENTIAL METHOD BLD: ABNORMAL
EOSINOPHIL # BLD: 0 K/UL (ref 0–0.8)
EOSINOPHIL NFR BLD: 0 % (ref 0.5–7.8)
ERYTHROCYTE [DISTWIDTH] IN BLOOD BY AUTOMATED COUNT: 13.3 % (ref 11.9–14.6)
GLOBULIN SER CALC-MCNC: 4.4 G/DL (ref 2.3–3.5)
GLUCOSE SERPL-MCNC: 100 MG/DL (ref 65–100)
HCT VFR BLD AUTO: 40.2 % (ref 41.1–50.3)
HGB BLD-MCNC: 13.3 G/DL (ref 13.6–17.2)
IMM GRANULOCYTES # BLD AUTO: 0 K/UL (ref 0–0.5)
IMM GRANULOCYTES NFR BLD AUTO: 0 % (ref 0–5)
LACTATE SERPL-SCNC: 1.5 MMOL/L (ref 0.4–2)
LYMPHOCYTES # BLD: 1.2 K/UL (ref 0.5–4.6)
LYMPHOCYTES NFR BLD: 24 % (ref 13–44)
MCH RBC QN AUTO: 30.3 PG (ref 26.1–32.9)
MCHC RBC AUTO-ENTMCNC: 33.1 G/DL (ref 31.4–35)
MCV RBC AUTO: 91.6 FL (ref 79.6–97.8)
MONOCYTES # BLD: 0.4 K/UL (ref 0.1–1.3)
MONOCYTES NFR BLD: 8 % (ref 4–12)
NEUTS SEG # BLD: 3.5 K/UL (ref 1.7–8.2)
NEUTS SEG NFR BLD: 68 % (ref 43–78)
NRBC # BLD: 0 K/UL (ref 0–0.2)
PLATELET # BLD AUTO: 171 K/UL (ref 150–450)
PMV BLD AUTO: 9.4 FL (ref 9.4–12.3)
POTASSIUM SERPL-SCNC: 3.8 MMOL/L (ref 3.5–5.1)
PROT SERPL-MCNC: 7.4 G/DL (ref 6.3–8.2)
RBC # BLD AUTO: 4.39 M/UL (ref 4.23–5.6)
SERVICE CMNT-IMP: NORMAL
SERVICE CMNT-IMP: NORMAL
SODIUM SERPL-SCNC: 140 MMOL/L (ref 136–145)
WBC # BLD AUTO: 5.2 K/UL (ref 4.3–11.1)

## 2020-09-12 PROCEDURE — 99285 EMERGENCY DEPT VISIT HI MDM: CPT

## 2020-09-12 PROCEDURE — 65270000029 HC RM PRIVATE

## 2020-09-12 PROCEDURE — 74011250636 HC RX REV CODE- 250/636: Performed by: EMERGENCY MEDICINE

## 2020-09-12 PROCEDURE — 85025 COMPLETE CBC W/AUTO DIFF WBC: CPT

## 2020-09-12 PROCEDURE — 93005 ELECTROCARDIOGRAM TRACING: CPT | Performed by: EMERGENCY MEDICINE

## 2020-09-12 PROCEDURE — 74011250637 HC RX REV CODE- 250/637: Performed by: EMERGENCY MEDICINE

## 2020-09-12 PROCEDURE — 83605 ASSAY OF LACTIC ACID: CPT

## 2020-09-12 PROCEDURE — 80053 COMPREHEN METABOLIC PANEL: CPT

## 2020-09-12 PROCEDURE — 71045 X-RAY EXAM CHEST 1 VIEW: CPT

## 2020-09-12 RX ORDER — ACETAMINOPHEN 500 MG
1000 TABLET ORAL
Status: COMPLETED | OUTPATIENT
Start: 2020-09-12 | End: 2020-09-12

## 2020-09-12 RX ADMIN — SODIUM CHLORIDE 1000 ML: 900 INJECTION, SOLUTION INTRAVENOUS at 21:58

## 2020-09-12 RX ADMIN — ACETAMINOPHEN 1000 MG: 500 TABLET, FILM COATED ORAL at 23:04

## 2020-09-12 NOTE — PROGRESS NOTES
Patient contacted regarding recent visit for viral symptoms. This Trish Cruz contacted the wife by telephone to perform post discharge call. Verified name and  with wife as identifiers. Provided introduction to self, and reason for call due to viral symptoms of infection and/or exposure to COVID-19. Discussed COVID-19 related testing which was available at this time. Test results were positive. Patient informed of results, if available? yes     Advance Care Planning:   Does patient have an Advance Directive: currently not on file; education provided     Patient presented to emergency department/flu clinic with complaints of viral symptoms/exposure to [employer]. Patient reports symptoms are the same. Due to worsening symptoms the RN CTN/RENATE was not notified for escalation. Discussed exposure protocols and quarantine with CDC Guidelines What To Do If You Are Sick    Wife was given an opportunity for questions and concerns. Stay home except to get medical care  Separate yourself from other people and animals in your home  Call ahead before visiting your doctor  Wear a facemask  Cover your coughs and sneezes  Clean your hands often  Avoid sharing personal household items  Clean all high-touch surfaces everyday    Monitor your symptoms  Seek prompt medical attention if your illness is worsening (e.g., difficulty breathing). Before seeking care, call your healthcare provider and tell them that you have, or are being evaluated for, COVID-19. Put on a facemask before you enter the facility. These steps will help the healthcare provider's office to keep other people in the office or waiting room from getting infected or exposed. Ask your healthcare provider to call the local or state health department. Persons who are placed under If you have a medical emergency and need to call 911, notify the dispatch personnel that you have, or are being evaluated for COVID-19.  If possible, put on a facemask before emergency medical services arrive. The wife agrees to contact the [facility] exposure line [phone], local [facility] department [address]: ([phone]) and PCP office for questions related to their healthcare. Author provided contact information for future reference. Patient/family/caregiver given information for [facility] and agrees to enroll no  Patient preferred e-mail: no  Patient preferred phone contact: [phone]     Patient and his wife were in the ED together with worsening symptoms from Covid. She is doing better, stated her  is still having problems with his breathing, has started on Albuterol. Stated he feels pressure in his chest when he is up and walking in the house. Does not feel it while he is in bed. Advised her to have him walk in the house from room to room. He has to keep moving. If he continues to have issues, go back to ED.  She voiced understanding

## 2020-09-13 PROBLEM — J96.01 ACUTE RESPIRATORY FAILURE WITH HYPOXIA (HCC): Status: ACTIVE | Noted: 2020-09-13

## 2020-09-13 LAB
ABO + RH BLD: NORMAL
ANION GAP SERPL CALC-SCNC: 6 MMOL/L (ref 7–16)
ATRIAL RATE: 76 BPM
BACTERIA SPEC CULT: NORMAL
BASOPHILS # BLD: 0 K/UL (ref 0–0.2)
BASOPHILS NFR BLD: 0 % (ref 0–2)
BLOOD GROUP ANTIBODIES SERPL: NORMAL
BUN SERPL-MCNC: 7 MG/DL (ref 6–23)
CALCIUM SERPL-MCNC: 8.2 MG/DL (ref 8.3–10.4)
CALCULATED P AXIS, ECG09: 50 DEGREES
CALCULATED R AXIS, ECG10: 16 DEGREES
CALCULATED T AXIS, ECG11: 43 DEGREES
CHLORIDE SERPL-SCNC: 112 MMOL/L (ref 98–107)
CO2 SERPL-SCNC: 25 MMOL/L (ref 21–32)
CREAT SERPL-MCNC: 0.69 MG/DL (ref 0.8–1.5)
CRP SERPL-MCNC: 12.6 MG/DL (ref 0–0.9)
D DIMER PPP FEU-MCNC: 0.43 UG/ML(FEU)
DIAGNOSIS, 93000: NORMAL
DIFFERENTIAL METHOD BLD: ABNORMAL
EOSINOPHIL # BLD: 0 K/UL (ref 0–0.8)
EOSINOPHIL NFR BLD: 0 % (ref 0.5–7.8)
ERYTHROCYTE [DISTWIDTH] IN BLOOD BY AUTOMATED COUNT: 13.3 % (ref 11.9–14.6)
GLUCOSE SERPL-MCNC: 104 MG/DL (ref 65–100)
HCT VFR BLD AUTO: 38.6 % (ref 41.1–50.3)
HGB BLD-MCNC: 12.9 G/DL (ref 13.6–17.2)
IMM GRANULOCYTES # BLD AUTO: 0 K/UL (ref 0–0.5)
IMM GRANULOCYTES NFR BLD AUTO: 0 % (ref 0–5)
LYMPHOCYTES # BLD: 1.3 K/UL (ref 0.5–4.6)
LYMPHOCYTES NFR BLD: 22 % (ref 13–44)
MCH RBC QN AUTO: 30.4 PG (ref 26.1–32.9)
MCHC RBC AUTO-ENTMCNC: 33.4 G/DL (ref 31.4–35)
MCV RBC AUTO: 91 FL (ref 79.6–97.8)
MONOCYTES # BLD: 0.5 K/UL (ref 0.1–1.3)
MONOCYTES NFR BLD: 8 % (ref 4–12)
NEUTS SEG # BLD: 4.2 K/UL (ref 1.7–8.2)
NEUTS SEG NFR BLD: 70 % (ref 43–78)
NRBC # BLD: 0 K/UL (ref 0–0.2)
P-R INTERVAL, ECG05: 174 MS
PLATELET # BLD AUTO: 166 K/UL (ref 150–450)
PMV BLD AUTO: 9.9 FL (ref 9.4–12.3)
POTASSIUM SERPL-SCNC: 4 MMOL/L (ref 3.5–5.1)
Q-T INTERVAL, ECG07: 376 MS
QRS DURATION, ECG06: 92 MS
QTC CALCULATION (BEZET), ECG08: 423 MS
RBC # BLD AUTO: 4.24 M/UL (ref 4.23–5.6)
SERVICE CMNT-IMP: NORMAL
SODIUM SERPL-SCNC: 143 MMOL/L (ref 136–145)
SPECIMEN EXP DATE BLD: NORMAL
VENTRICULAR RATE, ECG03: 76 BPM
WBC # BLD AUTO: 6.1 K/UL (ref 4.3–11.1)

## 2020-09-13 PROCEDURE — 36430 TRANSFUSION BLD/BLD COMPNT: CPT

## 2020-09-13 PROCEDURE — 85379 FIBRIN DEGRADATION QUANT: CPT

## 2020-09-13 PROCEDURE — 74011250636 HC RX REV CODE- 250/636: Performed by: INTERNAL MEDICINE

## 2020-09-13 PROCEDURE — 86900 BLOOD TYPING SEROLOGIC ABO: CPT

## 2020-09-13 PROCEDURE — 74011000258 HC RX REV CODE- 258: Performed by: INTERNAL MEDICINE

## 2020-09-13 PROCEDURE — XW033E5 INTRODUCTION OF REMDESIVIR ANTI-INFECTIVE INTO PERIPHERAL VEIN, PERCUTANEOUS APPROACH, NEW TECHNOLOGY GROUP 5: ICD-10-PCS | Performed by: INTERNAL MEDICINE

## 2020-09-13 PROCEDURE — 65270000029 HC RM PRIVATE

## 2020-09-13 PROCEDURE — 85025 COMPLETE CBC W/AUTO DIFF WBC: CPT

## 2020-09-13 PROCEDURE — 94640 AIRWAY INHALATION TREATMENT: CPT

## 2020-09-13 PROCEDURE — 80048 BASIC METABOLIC PNL TOTAL CA: CPT

## 2020-09-13 PROCEDURE — XW13325 TRANSFUSION OF CONVALESCENT PLASMA (NONAUTOLOGOUS) INTO PERIPHERAL VEIN, PERCUTANEOUS APPROACH, NEW TECHNOLOGY GROUP 5: ICD-10-PCS | Performed by: INTERNAL MEDICINE

## 2020-09-13 PROCEDURE — 94762 N-INVAS EAR/PLS OXIMTRY CONT: CPT

## 2020-09-13 PROCEDURE — 74011250636 HC RX REV CODE- 250/636: Performed by: FAMILY MEDICINE

## 2020-09-13 PROCEDURE — 74011250637 HC RX REV CODE- 250/637: Performed by: FAMILY MEDICINE

## 2020-09-13 PROCEDURE — 74011000250 HC RX REV CODE- 250: Performed by: INTERNAL MEDICINE

## 2020-09-13 PROCEDURE — 74011250637 HC RX REV CODE- 250/637: Performed by: INTERNAL MEDICINE

## 2020-09-13 PROCEDURE — 86140 C-REACTIVE PROTEIN: CPT

## 2020-09-13 PROCEDURE — 77010033711 HC HIGH FLOW OXYGEN

## 2020-09-13 RX ORDER — ASCORBIC ACID 500 MG
1000 TABLET ORAL DAILY
Status: DISCONTINUED | OUTPATIENT
Start: 2020-09-13 | End: 2020-09-18 | Stop reason: HOSPADM

## 2020-09-13 RX ORDER — SODIUM CHLORIDE 9 MG/ML
30 INJECTION, SOLUTION INTRAVENOUS EVERY 24 HOURS
Status: COMPLETED | OUTPATIENT
Start: 2020-09-13 | End: 2020-09-17

## 2020-09-13 RX ORDER — SODIUM CHLORIDE 0.9 % (FLUSH) 0.9 %
5-40 SYRINGE (ML) INJECTION AS NEEDED
Status: DISCONTINUED | OUTPATIENT
Start: 2020-09-13 | End: 2020-09-18 | Stop reason: HOSPADM

## 2020-09-13 RX ORDER — SODIUM CHLORIDE 9 MG/ML
250 INJECTION, SOLUTION INTRAVENOUS AS NEEDED
Status: DISCONTINUED | OUTPATIENT
Start: 2020-09-13 | End: 2020-09-18 | Stop reason: HOSPADM

## 2020-09-13 RX ORDER — HYDROCODONE BITARTRATE AND HOMATROPINE METHYLBROMIDE 1.5; 5 MG/5ML; MG/5ML
5 SYRUP ORAL
Status: DISCONTINUED | OUTPATIENT
Start: 2020-09-13 | End: 2020-09-13

## 2020-09-13 RX ORDER — ONDANSETRON 2 MG/ML
4 INJECTION INTRAMUSCULAR; INTRAVENOUS
Status: DISCONTINUED | OUTPATIENT
Start: 2020-09-13 | End: 2020-09-18 | Stop reason: HOSPADM

## 2020-09-13 RX ORDER — FAMOTIDINE 20 MG/1
20 TABLET, FILM COATED ORAL 2 TIMES DAILY
Status: DISCONTINUED | OUTPATIENT
Start: 2020-09-13 | End: 2020-09-18 | Stop reason: HOSPADM

## 2020-09-13 RX ORDER — GUAIFENESIN 600 MG/1
1200 TABLET, EXTENDED RELEASE ORAL 2 TIMES DAILY
Status: DISCONTINUED | OUTPATIENT
Start: 2020-09-13 | End: 2020-09-18 | Stop reason: HOSPADM

## 2020-09-13 RX ORDER — HYDROCODONE BITARTRATE AND HOMATROPINE METHYLBROMIDE 1.5; 5 MG/5ML; MG/5ML
5 SYRUP ORAL
Status: DISCONTINUED | OUTPATIENT
Start: 2020-09-13 | End: 2020-09-18 | Stop reason: HOSPADM

## 2020-09-13 RX ORDER — ENOXAPARIN SODIUM 100 MG/ML
40 INJECTION SUBCUTANEOUS DAILY
Status: DISCONTINUED | OUTPATIENT
Start: 2020-09-13 | End: 2020-09-18 | Stop reason: HOSPADM

## 2020-09-13 RX ORDER — DEXAMETHASONE 4 MG/1
6 TABLET ORAL DAILY
Status: DISCONTINUED | OUTPATIENT
Start: 2020-09-13 | End: 2020-09-18 | Stop reason: HOSPADM

## 2020-09-13 RX ORDER — ACETAMINOPHEN 325 MG/1
650 TABLET ORAL
Status: DISCONTINUED | OUTPATIENT
Start: 2020-09-13 | End: 2020-09-18 | Stop reason: HOSPADM

## 2020-09-13 RX ORDER — ALBUTEROL SULFATE 0.83 MG/ML
2.5 SOLUTION RESPIRATORY (INHALATION)
Status: DISCONTINUED | OUTPATIENT
Start: 2020-09-13 | End: 2020-09-18 | Stop reason: HOSPADM

## 2020-09-13 RX ORDER — UREA 10 %
220 LOTION (ML) TOPICAL DAILY
Status: DISCONTINUED | OUTPATIENT
Start: 2020-09-13 | End: 2020-09-18 | Stop reason: HOSPADM

## 2020-09-13 RX ORDER — ALBUTEROL SULFATE 90 UG/1
1 AEROSOL, METERED RESPIRATORY (INHALATION)
Status: DISCONTINUED | OUTPATIENT
Start: 2020-09-13 | End: 2020-09-14

## 2020-09-13 RX ORDER — SODIUM CHLORIDE 0.9 % (FLUSH) 0.9 %
5-40 SYRINGE (ML) INJECTION EVERY 8 HOURS
Status: DISCONTINUED | OUTPATIENT
Start: 2020-09-13 | End: 2020-09-18 | Stop reason: HOSPADM

## 2020-09-13 RX ORDER — ASCORBIC ACID 500 MG
1000 TABLET ORAL DAILY
Status: DISCONTINUED | OUTPATIENT
Start: 2020-09-13 | End: 2020-09-13

## 2020-09-13 RX ORDER — POLYETHYLENE GLYCOL 3350 17 G/17G
17 POWDER, FOR SOLUTION ORAL DAILY
Status: DISCONTINUED | OUTPATIENT
Start: 2020-09-13 | End: 2020-09-18 | Stop reason: HOSPADM

## 2020-09-13 RX ORDER — PROMETHAZINE HYDROCHLORIDE 25 MG/1
12.5 TABLET ORAL
Status: DISCONTINUED | OUTPATIENT
Start: 2020-09-13 | End: 2020-09-18 | Stop reason: HOSPADM

## 2020-09-13 RX ORDER — GUAIFENESIN/DEXTROMETHORPHAN 100-10MG/5
10 SYRUP ORAL
Status: DISCONTINUED | OUTPATIENT
Start: 2020-09-13 | End: 2020-09-18 | Stop reason: HOSPADM

## 2020-09-13 RX ORDER — ACETAMINOPHEN 650 MG/1
650 SUPPOSITORY RECTAL
Status: DISCONTINUED | OUTPATIENT
Start: 2020-09-13 | End: 2020-09-18 | Stop reason: HOSPADM

## 2020-09-13 RX ADMIN — GUAIFENESIN 1200 MG: 600 TABLET ORAL at 16:21

## 2020-09-13 RX ADMIN — ENOXAPARIN SODIUM 40 MG: 40 INJECTION SUBCUTANEOUS at 08:32

## 2020-09-13 RX ADMIN — DEXAMETHASONE 6 MG: 4 TABLET ORAL at 08:32

## 2020-09-13 RX ADMIN — GUAIFENESIN 1200 MG: 600 TABLET ORAL at 08:33

## 2020-09-13 RX ADMIN — Medication 10 ML: at 22:00

## 2020-09-13 RX ADMIN — HYDROCODONE BITARTRATE AND HOMATROPINE METHYLBROMIDE 5 ML: 5; 1.5 SOLUTION ORAL at 16:48

## 2020-09-13 RX ADMIN — SODIUM CHLORIDE 30 ML: 900 INJECTION, SOLUTION INTRAVENOUS at 13:03

## 2020-09-13 RX ADMIN — ALBUTEROL SULFATE 1 PUFF: 108 INHALANT RESPIRATORY (INHALATION) at 15:50

## 2020-09-13 RX ADMIN — HYDROCODONE BITARTRATE AND HOMATROPINE METHYLBROMIDE 5 ML: 5; 1.5 SOLUTION ORAL at 04:30

## 2020-09-13 RX ADMIN — FAMOTIDINE 20 MG: 20 TABLET, FILM COATED ORAL at 16:21

## 2020-09-13 RX ADMIN — Medication 1000 MG: at 08:34

## 2020-09-13 RX ADMIN — ALBUTEROL SULFATE 1 PUFF: 108 INHALANT RESPIRATORY (INHALATION) at 12:45

## 2020-09-13 RX ADMIN — REMDESIVIR 200 MG: 100 INJECTION, POWDER, LYOPHILIZED, FOR SOLUTION INTRAVENOUS at 12:35

## 2020-09-13 RX ADMIN — Medication 10 ML: at 08:33

## 2020-09-13 RX ADMIN — HYDROCODONE BITARTRATE AND HOMATROPINE METHYLBROMIDE 5 ML: 5; 1.5 SOLUTION ORAL at 13:02

## 2020-09-13 RX ADMIN — POLYETHYLENE GLYCOL 3350 17 G: 17 POWDER, FOR SOLUTION ORAL at 08:34

## 2020-09-13 RX ADMIN — FAMOTIDINE 20 MG: 20 TABLET, FILM COATED ORAL at 08:33

## 2020-09-13 RX ADMIN — ALBUTEROL SULFATE 1 PUFF: 108 INHALANT RESPIRATORY (INHALATION) at 20:14

## 2020-09-13 RX ADMIN — ALBUTEROL SULFATE 1 PUFF: 108 INHALANT RESPIRATORY (INHALATION) at 08:32

## 2020-09-13 RX ADMIN — Medication 220 MG: at 08:33

## 2020-09-13 NOTE — H&P
HOSPITALIST H&P  NAME:  Aly Ch   Age:  53 y.o.  :   1970   MRN:   [id number]  PCP: None  Treatment Team: Attending Provider: Marci Hilliard MD    Prior     CC: Reason for admission is: COVID infection    HPI:   Patient history was obtained from the ER provider prior to seeing the patient. Patient is a 53 y.o. male who presents to the ER due to being COVID+ and worsening SOB. His entire family is COVID +. Reports continued fevers, headaches, fatigue, and significant FERNANDEZ. Denies chest pain, abdominal pain, n/v/d. Sats dropped to high 80's with just moving from wheelchair to stretcher. ROS:  All systems have been reviewed and are negative except as stated in HPI or elsewhere. Past Medical History:   Diagnosis Date    GERD (gastroesophageal reflux disease)     managed well with PRN OTC meds    Hypercholesteremia     no current meds at this time- attempted to manage with diet.  Right shoulder pain       Past Surgical History:   Procedure Laterality Date    HX KNEE ARTHROSCOPY Right     HX OTHER SURGICAL      eye surg as a child      Social History     Tobacco Use    Smoking status: Never Smoker    Smokeless tobacco: Never Used   Substance Use Topics    Alcohol use: Yes     Comment: occ      Family History   Problem Relation Age of Onset    Anemia Mother     Hypertension Father     Parkinson's Disease Father        FH Reviewed and non-contributory to admitting diagnosis    No Known Allergies   Prior to Admission Medications   Prescriptions Last Dose Informant Patient Reported? Taking? albuterol (PROVENTIL HFA, VENTOLIN HFA, PROAIR HFA) 90 mcg/actuation inhaler   No No   Sig: Take 2 puffs by inhalation every four (4) hours as needed (for cough, use with spacer chamber please). albuterol (PROVENTIL HFA, VENTOLIN HFA, PROAIR HFA) 90 mcg/actuation inhaler   No No   Sig: Take 2 Puffs by inhalation every four (4) hours as needed for Wheezing.    ascorbic acid, vitamin C, (Vitamin C) 250 mg tablet   Yes No   Sig: Take 250 mg by mouth daily. cholecalciferol, vitamin D3, (VITAMIN D3 PO)   Yes No   Sig: Take 1 Tab by mouth daily. doxycycline (VIBRA-TABS) 100 mg tablet   No No   Sig: Take 1 Tab by mouth two (2) times a day for 7 days. meloxicam (Mobic) 7.5 mg tablet   Yes No   Sig: Take 7.5 mg by mouth daily. ondansetron (ZOFRAN ODT) 8 mg disintegrating tablet   No No   Sig: Take 1 Tab by mouth every eight (8) hours as needed for Nausea. ondansetron (Zofran ODT) 4 mg disintegrating tablet   No No   Sig: Take 1 Tab by mouth every eight (8) hours as needed for Nausea. promethazine (PHENERGAN) 25 mg tablet   No No   Sig: Take 1 Tab by mouth every six (6) hours as needed for Nausea. Facility-Administered Medications: None         Objective: Intake/Output Summary (Last 24 hours) at 2020 0356  Last data filed at 2020 0004  Gross per 24 hour   Intake 1000 ml   Output    Net 1000 ml      Temp (24hrs), Av.4 °F (37.4 °C), Min:98.6 °F (37 °C), Max:100.2 °F (37.9 °C)    Oxygen Therapy  O2 Sat (%): 92 % (20)  Pulse via Oximetry: 61 beats per minute (20)  O2 Device: Nasal cannula (20)  O2 Flow Rate (L/min): 3 l/min (20)   Body mass index is 30.67 kg/m². Patient Vitals for the past 24 hrs:   Temp Pulse Resp BP SpO2   20 98.6 °F (37 °C) 78 24 139/84 92 %   20 0020  61 23 112/75 98 %   20 2359  62 24 113/67 97 %   20 2339  64  116/75 99 %   20 2320  68  118/73 99 %   20 2259  66 26 121/78 99 %   20 2152     97 %   20 2140 100.2 °F (37.9 °C) 75 22 127/79 93 %     Physical Exam:    General:    WD and WN, No apparent distress. Ill appearing  Head:   Normocephalic, without obvious abnormality, atraumatic.   Eyes:  PERRL; EOMI; sclera normal/non-icteric  ENT:  Hearing is normal.  Oropharynx is clear with tacky mucous membranes   Resp:    Clear to auscultation bilaterally. No Wheezing or Rhonchi. Resp are even and unlabored  Heart[de-identified]  Regular rate and rhythm,  no murmur,   No LE edema  Abdomen:   Soft, non-tender. Not distended. Bowel sounds normal.  hepato-splenomegaly-none  Musc/SK: Muscle strength is good and tone normal; No cyanosis. No clubbing  Skin:     Texture, turgor normal. No significant rashes or lesions. Capillary refill < 2 sec  Neurologic: CN II - XII are grossly intact - Eye exam as noted above  Psych: Alert and oriented x 4;  Judgement and insight are normal     Data Review:   Recent Results (from the past 24 hour(s))   CBC WITH AUTOMATED DIFF    Collection Time: 09/12/20  9:53 PM   Result Value Ref Range    WBC 5.2 4.3 - 11.1 K/uL    RBC 4.39 4.23 - 5.6 M/uL    HGB 13.3 (L) 13.6 - 17.2 g/dL    HCT 40.2 (L) 41.1 - 50.3 %    MCV 91.6 79.6 - 97.8 FL    MCH 30.3 26.1 - 32.9 PG    MCHC 33.1 31.4 - 35.0 g/dL    RDW 13.3 11.9 - 14.6 %    PLATELET 303 167 - 701 K/uL    MPV 9.4 9.4 - 12.3 FL    ABSOLUTE NRBC 0.00 0.0 - 0.2 K/uL    DF AUTOMATED      NEUTROPHILS 68 43 - 78 %    LYMPHOCYTES 24 13 - 44 %    MONOCYTES 8 4.0 - 12.0 %    EOSINOPHILS 0 (L) 0.5 - 7.8 %    BASOPHILS 0 0.0 - 2.0 %    IMMATURE GRANULOCYTES 0 0.0 - 5.0 %    ABS. NEUTROPHILS 3.5 1.7 - 8.2 K/UL    ABS. LYMPHOCYTES 1.2 0.5 - 4.6 K/UL    ABS. MONOCYTES 0.4 0.1 - 1.3 K/UL    ABS. EOSINOPHILS 0.0 0.0 - 0.8 K/UL    ABS. BASOPHILS 0.0 0.0 - 0.2 K/UL    ABS. IMM.  GRANS. 0.0 0.0 - 0.5 K/UL   LACTIC ACID    Collection Time: 09/12/20  9:53 PM   Result Value Ref Range    Lactic acid 1.5 0.4 - 2.0 MMOL/L   METABOLIC PANEL, COMPREHENSIVE    Collection Time: 09/12/20  9:53 PM   Result Value Ref Range    Sodium 140 136 - 145 mmol/L    Potassium 3.8 3.5 - 5.1 mmol/L    Chloride 108 (H) 98 - 107 mmol/L    CO2 27 21 - 32 mmol/L    Anion gap 5 (L) 7 - 16 mmol/L    Glucose 100 65 - 100 mg/dL    BUN 8 6 - 23 MG/DL    Creatinine 0.88 0.8 - 1.5 MG/DL    GFR est AA >60 >60 ml/min/1.73m2    GFR est non-AA >60 >60 ml/min/1.73m2    Calcium 8.3 8.3 - 10.4 MG/DL    Bilirubin, total 0.3 0.2 - 1.1 MG/DL    ALT (SGPT) 82 (H) 12 - 65 U/L    AST (SGOT) 55 (H) 15 - 37 U/L    Alk. phosphatase 101 50 - 136 U/L    Protein, total 7.4 6.3 - 8.2 g/dL    Albumin 3.0 (L) 3.5 - 5.0 g/dL    Globulin 4.4 (H) 2.3 - 3.5 g/dL    A-G Ratio 0.7 (L) 1.2 - 3.5       CXR Results  (Last 48 hours)               09/12/20 2209  XR CHEST PORT Final result    Impression:  IMPRESSION: Progressed patchy bilateral lung infiltrates, suspect for acute   pneumonia such as Covid 19. Narrative:  EXAM: Chest x-ray. INDICATION: Dyspnea. Covid 19. COMPARISON: Yesterday's chest x-ray. TECHNIQUE: Frontal view chest x-ray. FINDINGS: There are progressed patchy diffuse bilateral lung infiltrates. Cardiac size and mediastinal contour are within normal limits. No pneumothorax   or pleural effusion is seen. 09/11/20 1046  XR CHEST PORT Final result    Impression:  IMPRESSION: Low lung volumes with middle and lower lung zone peripheral   interstitial opacities. This pattern is consistent with a viral pneumonia. Narrative:  CHEST ONE VIEW       HISTORY: Shortness of breath. Worsening  COVID-19 symptoms. COMPARISON: September 7, 2020       FINDINGS: The lung volumes are diminished. Interstitial densities are present in   the periphery of both middle and lower lung zones. Pleural spaces are clear. CT Results  (Last 48 hours)    None              Assessment and Plan:      Active Hospital Problems    Diagnosis Date Noted    COVID-19 virus infection 09/12/2020     Active Problems:    COVID-19 virus infection (9/12/2020)     Admit to COVID floor, follow-up COVID testing, isolation precautions   Supplemental O2 to maintain saturation greater than 92%   Albuterol as needed for shortness of breath, continue if helps symptoms   Obtain baseline type and screen, d dimer,    Tylenol PRN for fevers, Hycodan for cough   We will consider convalescent plasma therapy if patient continues to be hypoxic   If patient decompensates, will consider remdesivir and trial of dexamethasone; consult Pulm and/or ID as deemed appropriate  --Verbally consented for convalescent plasma with review of risks/benefits/ explanation that this is not a \"cure\" but may improve symptoms and severity of illness      · PLAN General  · DVT prophylaxis:  Lovenox  · Code status: Full;  HCPOA:   · Risk: high  · Anticipated DC needs:  · Estimated LOS:  Greater than 2 midnights  · Plans discussed with patient and/or caregiver; questions answered. Parts of this document were created using dragon voice recognition software.  The chart has been reviewed but errors may still be present    Med records reviewed if applicable; findings:     Critical care time if applicable:      Signed By: Riley Skiff, MD     September 13, 2020

## 2020-09-13 NOTE — PROGRESS NOTES
Patient arrived to floor via stretcher A/Ox4  Respirations even on 3L NC there are no signs of distress at this time  Will continue to monitor

## 2020-09-13 NOTE — PROGRESS NOTES
Problem: Falls - Risk of  Goal: *Absence of Falls  Description: Document Jody Patches Fall Risk and appropriate interventions in the flowsheet.   Outcome: Progressing Towards Goal  Note: Fall Risk Interventions:            Medication Interventions: Teach patient to arise slowly, Patient to call before getting OOB                   Problem: Patient Education: Go to Patient Education Activity  Goal: Patient/Family Education  Outcome: Progressing Towards Goal

## 2020-09-13 NOTE — CONSULTS
Infectious Disease Non-face to Face Encounter    Today's date: 9/13/2020  Admit date: 9/12/2020    Impression:     1. COVID-19 infection (9/12/20)  2. Acute hypoxic respiratory failure    Recommendations:      Agree with convalescent serum if available  · Patient is a candidate for Remdisivir through [facility]; application sent today. · Please provide patient with FDA EUA fact sheet (links listed below) PRIOR to initiation of medication  · Please continue to check daily CMPs while on RDV  · Agree with dexamethasone 6 mg po Q24h for 10 days    Given large volume of patients with COVID, we are unable to follow each patient closely. Please call us if there is a clinical changes/questions/concerns through [address] [place] [address] Remdesivir request form - [email or web address]  Fact Sheet for Patients And Parent/Caregivers   Fact Sheets for Patients and Parent/Caregivers - Turkish    Anti-Infectives:      None    Clinical Synopsis:     All Wade MD messaged requesting ID opinion/advice to assist on the care and management of this patient. Patient's chart was reviewed including relevant notes, labs, imaging, medications, pathology, PMH, PSH, FH, and SH. Briefly, patient admitted on 9/12/2020 after presenting with progressive dyspnea over the last 2 days. Patient was found to be hypoxic in the ED and is still requiring nasal cannula O2. Allergies:  No Known Allergies    Anticoagulants:  Key Anti-Platelet Anticoagulant Meds     The patient is on no antiplatelet meds or anticoagulants.            Objective:     Physical Exam:    Vitals:   Current   Temperature: 98.8 °F (37.1 °C)   Heart Rate: 69   Resp Rate: 19   Blood Pressure: 107/66   Oxygen Sats: 95 %         Patient Vitals for the past 12 hrs:   Temp Pulse Resp BP SpO2   09/13/20 1530 98.8 °F (37.1 °C) 69 19 107/66 95 %   09/13/20 1245     95 %   09/13/20 1235     90 %   09/13/20 1215 99 °F (37.2 °C) 75 25 126/76 (!) 82 %   09/13/20 1144 100.2 °F (37.9 °C) 74 23 120/75 92 %   09/13/20 1055 100.3 °F (37.9 °C) 74 22 116/71 91 %   09/13/20 1038 99.7 °F (37.6 °C) 82 21 131/80 90 %   09/13/20 0727 100.2 °F (37.9 °C) 85 22 (!) 142/89 96 %   09/13/20 0414 99.4 °F (37.4 °C) 81 26 (!) 135/90 92 %       No components found for: QTC      Labs:     COVID 19 related labs:  C-Reactive protein   Date Value Ref Range Status   09/13/2020 12.6 (H) 0.0 - 0.9 mg/dL Final       CBC:      CBC WITH AUTOMATED DIFF    Collection Time: 09/13/20  4:22 AM   Result Value Ref Range    WBC 6.1 4.3 - 11.1 K/uL    RBC 4.24 4.23 - 5.6 M/uL    HGB 12.9 (L) 13.6 - 17.2 g/dL    HCT 38.6 (L) 41.1 - 50.3 %    MCV 91.0 79.6 - 97.8 FL    MCH 30.4 26.1 - 32.9 PG    MCHC 33.4 31.4 - 35.0 g/dL    RDW 13.3 11.9 - 14.6 %    PLATELET 227 883 - 337 K/uL    MPV 9.9 9.4 - 12.3 FL    ABSOLUTE NRBC 0.00 0.0 - 0.2 K/uL    DF AUTOMATED      NEUTROPHILS 70 43 - 78 %    LYMPHOCYTES 22 13 - 44 %    MONOCYTES 8 4.0 - 12.0 %    EOSINOPHILS 0 (L) 0.5 - 7.8 %    BASOPHILS 0 0.0 - 2.0 %    IMMATURE GRANULOCYTES 0 0.0 - 5.0 %    ABS. NEUTROPHILS 4.2 1.7 - 8.2 K/UL    ABS. LYMPHOCYTES 1.3 0.5 - 4.6 K/UL    ABS. MONOCYTES 0.5 0.1 - 1.3 K/UL    ABS. EOSINOPHILS 0.0 0.0 - 0.8 K/UL    ABS. BASOPHILS 0.0 0.0 - 0.2 K/UL    ABS. IMM. GRANS. 0.0 0.0 - 0.5 K/UL       CMP:  No results found for this visit on 09/12/20 (from the past 12 hour(s)). Microbiology:     All Micro Results     None          Recent Imaging:     Reviewed    Signed By: [unfilled]     September 13, 2020      Please note, requesting provider was notified via CallApp of the above documentation for his or her review. The patient's status was discussed with the patient's primary provider as well as the patient's primary nurse.     ID did not physically enter the patient's room, nor did ID use a remote telehealth monitor, due to facility restrictions on the use of personal protective equipment and the need to preserve personal protective equipment at this time.     Time spent on the above: 15 minutes

## 2020-09-13 NOTE — PROGRESS NOTES
BSR received  Patient resting quietly in bed  Respirations are even on 9LNCHF  96%O2  Patient denies pain/needs at this time bed in lowest position wheels locked call light within reach  Patient instructed to call for assistance   Will continue to monitor

## 2020-09-13 NOTE — PROGRESS NOTES
Problem: Falls - Risk of  Goal: *Absence of Falls  Description: Document Lavonne Ruts Fall Risk and appropriate interventions in the flowsheet.   Outcome: Progressing Towards Goal  Note: Fall Risk Interventions:            Medication Interventions: Teach patient to arise slowly, Patient to call before getting OOB

## 2020-09-13 NOTE — PROGRESS NOTES
TRANSFER - IN REPORT:    Verbal report received from [facility]) on Saadia Person  being received from ED(unit) for routine progression of care      Report consisted of patients Situation, Background, Assessment and   Recommendations(SBAR). Information from the following report(s) SBAR was reviewed with the receiving nurse. Opportunity for questions and clarification was provided. Assessment completed upon patients arrival to unit and care assumed.

## 2020-09-13 NOTE — ED NOTES
TRANSFER - OUT REPORT:    Verbal report given to [facility] RN on Brett Gallegos  being transferred to [facility] for routine progression of care       Report consisted of patients Situation, Background, Assessment and   Recommendations(SBAR). Information from the following report(s) SBAR, ED Summary and MAR was reviewed with the receiving nurse. Lines:   Peripheral IV 09/12/20 Right Antecubital (Active)   Site Assessment Clean, dry, & intact 09/12/20 2152   Phlebitis Assessment 0 09/12/20 2152   Infiltration Assessment 0 09/12/20 2152   Dressing Status Clean, dry, & intact 09/12/20 2152        Opportunity for questions and clarification was provided.       Patient transported with:   O2 @ 3 liters  Tech

## 2020-09-13 NOTE — PROGRESS NOTES
Patient completed 1unit conv plasma. Patient O2 sat 82 on 2LNC. Increased to 7LNCHF, then to [facility] for O2 sat 90-91%. Patient fully upright in bed. Dr Alejandra Zavala made aware. Will continue to monitor.

## 2020-09-13 NOTE — ED PROVIDER NOTES
Mask was worn during the entire patient examination. Durga Acuna is a 53 y.o. male who presents to the ED with a chief complaint of increasing shortness of breath. Patient just tested positive couple days ago for COVID-19. His entire family is positive for this. He reports continued fevers he did take ibuprofen prior to arrival.  He is also having headaches. And family states whenever he gets up and moves any his sats are dropping to 85% on room air. He does not normally have any lung problems. He did drop to 88% with standing from wheelchair to get into the bed. He is running around 91  otherwise. Past Medical History:   Diagnosis Date    GERD (gastroesophageal reflux disease)     managed well with PRN OTC meds    Hypercholesteremia     no current meds at this time- attempted to manage with diet.     Right shoulder pain        Past Surgical History:   Procedure Laterality Date    HX KNEE ARTHROSCOPY Right     HX OTHER SURGICAL      eye surg as a child         Family History:   Problem Relation Age of Onset    Anemia Mother     Hypertension Father     Parkinson's Disease Father        Social History     Socioeconomic History    Marital status:      Spouse name: Not on file    Number of children: Not on file    Years of education: Not on file    Highest education level: Not on file   Occupational History    Not on file   Social Needs    Financial resource strain: Not on file    Food insecurity     Worry: Not on file     Inability: Not on file    Transportation needs     Medical: Not on file     Non-medical: Not on file   Tobacco Use    Smoking status: Never Smoker    Smokeless tobacco: Never Used   Substance and Sexual Activity    Alcohol use: Yes     Comment: occ    Drug use: No    Sexual activity: Never   Lifestyle    Physical activity     Days per week: Not on file     Minutes per session: Not on file    Stress: Not on file   Relationships    Social connections     Talks on phone: Not on file     Gets together: Not on file     Attends Mosque service: Not on file     Active member of club or organization: Not on file     Attends meetings of clubs or organizations: Not on file     Relationship status: Not on file    Intimate partner violence     Fear of current or ex partner: Not on file     Emotionally abused: Not on file     Physically abused: Not on file     Forced sexual activity: Not on file   Other Topics Concern    Not on file   Social History Narrative    Not on file         ALLERGIES: Patient has no known allergies. Review of Systems   Constitutional: Positive for chills and fever. Respiratory: Positive for cough, chest tightness and shortness of breath. Negative for wheezing and stridor. Skin: Negative for color change, pallor and wound. Neurological: Positive for headaches. Negative for weakness and numbness. All other systems reviewed and are negative. Vitals:    09/12/20 2140   BP: 127/79   Pulse: 75   Resp: 22   Temp: 100.2 °F (37.9 °C)   SpO2: 93%   Weight: 86.2 kg (190 lb)   Height: 5' 6\" (1.676 m)            Physical Exam  Vitals signs and nursing note reviewed. Constitutional:       General: He is not in acute distress. Appearance: He is well-developed. He is ill-appearing (pt looks ill and appears like he does not feel well. ). He is not toxic-appearing or diaphoretic. HENT:      Head: Normocephalic and atraumatic. Eyes:      General: No scleral icterus. Extraocular Movements: Extraocular movements intact. Conjunctiva/sclera: Conjunctivae normal.      Pupils: Pupils are equal, round, and reactive to light. Neck:      Musculoskeletal: Normal range of motion and neck supple. Thyroid: No thyromegaly. Trachea: No tracheal deviation. Cardiovascular:      Rate and Rhythm: Normal rate and regular rhythm. Pulmonary:      Effort: Pulmonary effort is normal. Tachypnea present.  No accessory muscle usage or respiratory distress. Breath sounds: No stridor. No decreased breath sounds, wheezing, rhonchi or rales. Chest:      Chest wall: No tenderness, crepitus or edema. Lymphadenopathy:      Cervical: No cervical adenopathy. Skin:     General: Skin is warm. Capillary Refill: Capillary refill takes less than 2 seconds. Coloration: Skin is not cyanotic or pale. Findings: No erythema. Neurological:      General: No focal deficit present. Mental Status: He is alert and oriented to person, place, and time. Mental status is at baseline. Psychiatric:         Mood and Affect: Mood normal. Mood is not anxious. Behavior: Behavior normal. Behavior is not agitated. MDM  Number of Diagnoses or Management Options  COVID-19:   Hypoxia:   SOB (shortness of breath):   Diagnosis management comments: Patient has COVID 19 and has continued to decline. Now can cannot walk at all without the setting. His x-rays have continued to worsen over the week. Family do not feel that he is safe at home anymore and I agree with the hypoxia is doing much better on a couple liters of oxygen and I have discussed case with hospitalist.      Mortimer Innocent, MD; 9/12/2020 @11:13 PM Voice dictation software was used during the making of this note. This software is not perfect and grammatical and other typographical errors may be present.   This note has not been proofread for errors.  ===================================================================          Amount and/or Complexity of Data Reviewed  Clinical lab tests: ordered and reviewed (Results for orders placed or performed during the hospital encounter of 09/12/20  -CBC WITH AUTOMATED DIFF       Result                      Value             Ref Range           WBC                         5.2               4.3 - 11.1 K*       RBC                         4.39              4.23 - 5.6 M*       HGB                         13.3 (L)          13.6 - 17.2 *       HCT                         40.2 (L)          41.1 - 50.3 %       MCV                         91.6              79.6 - 97.8 *       MCH                         30.3              26.1 - 32.9 *       MCHC                        33.1              31.4 - 35.0 *       RDW                         13.3              11.9 - 14.6 %       PLATELET                    171               150 - 450 K/*       MPV                         9.4               9.4 - 12.3 FL       ABSOLUTE NRBC               0.00              0.0 - 0.2 K/*       DF                          AUTOMATED                             NEUTROPHILS                 68                43 - 78 %           LYMPHOCYTES                 24                13 - 44 %           MONOCYTES                   8                 4.0 - 12.0 %        EOSINOPHILS                 0 (L)             0.5 - 7.8 %         BASOPHILS                   0                 0.0 - 2.0 %         IMMATURE GRANULOCYTES       0                 0.0 - 5.0 %         ABS. NEUTROPHILS            3.5               1.7 - 8.2 K/*       ABS. LYMPHOCYTES            1.2               0.5 - 4.6 K/*       ABS. MONOCYTES              0.4               0.1 - 1.3 K/*       ABS. EOSINOPHILS            0.0               0.0 - 0.8 K/*       ABS. BASOPHILS              0.0               0.0 - 0.2 K/*       ABS. IMM.  GRANS.            0.0               0.0 - 0.5 K/*  -LACTIC ACID       Result                      Value             Ref Range           Lactic acid                 1.5               0.4 - 2.0 MM*  -METABOLIC PANEL, COMPREHENSIVE       Result                      Value             Ref Range           Sodium                      140               136 - 145 mm*       Potassium                   3.8               3.5 - 5.1 mm*       Chloride                    108 (H)           98 - 107 mmo*       CO2                         27                21 - 32 mmol*       Anion gap                   5 (L) 7 - 16 mmol/L       Glucose                     100               65 - 100 mg/*       BUN                         8                 6 - 23 MG/DL        Creatinine                  0.88              0.8 - 1.5 MG*       GFR est AA                  >60               >60 ml/min/1*       GFR est non-AA              >60               >60 ml/min/1*       Calcium                     8.3               8.3 - 10.4 M*       Bilirubin, total            0.3               0.2 - 1.1 MG*       ALT (SGPT)                  82 (H)            12 - 65 U/L         AST (SGOT)                  55 (H)            15 - 37 U/L         Alk. phosphatase            101               50 - 136 U/L        Protein, total              7.4               6.3 - 8.2 g/*       Albumin                     3.0 (L)           3.5 - 5.0 g/*       Globulin                    4.4 (H)           2.3 - 3.5 g/*       A-G Ratio                   0.7 (L)           1.2 - 3.5     )  Tests in the radiology section of CPT®: ordered and reviewed (Xr Chest Port    Result Date: 9/12/2020  EXAM: Chest x-ray. INDICATION: Dyspnea. Covid 19. COMPARISON: Yesterday's chest x-ray. TECHNIQUE: Frontal view chest x-ray. FINDINGS: There are progressed patchy diffuse bilateral lung infiltrates. Cardiac size and mediastinal contour are within normal limits. No pneumothorax or pleural effusion is seen.      IMPRESSION: Progressed patchy bilateral lung infiltrates, suspect for acute pneumonia such as Covid 19.   )  Discuss the patient with other providers: yes  Independent visualization of images, tracings, or specimens: yes           Procedures

## 2020-09-13 NOTE — PROGRESS NOTES
Problem:  Activity Intolerance  Goal: *Able to remain out of bed as prescribed  Outcome: Not Progressing Towards Goal

## 2020-09-13 NOTE — ED TRIAGE NOTES
Pt arrives via wheelchair with mask in place. Pt was confirmed COVID+ on 9/9. States an increase in SOB, CP, headaches, fever and generalized body aches. States dyspnea worse with exertion. RA sat moving from wheelchair to stretcher initially 88%. Pt reports taking 800mg Ibuprofen 2 hours PTA.

## 2020-09-13 NOTE — PROGRESS NOTES
Hospitalist Note     Admit Date:  2020  9:35 PM   Name:  Marcela Lopez   Age:  53 y.o.  :  1970   MRN:  [id number]   PCP:  None  Treatment Team: Attending Provider: Leela Lopez MD; Utilization Review: Laurie Helm RN; Consulting Provider: Se Wu MD    HPI/Subjective:   Pt is a 54 y/o M admitted with COVID, hypoxia.  - worsening hypoxia, up to 9L today from 2L previously. Now on continuous pulse ox. Watching closely. Low grade temp. Plasma and remdesivir given this morning. Says cough is so frequent it is bothersome and contributing to SOB; hycodan ordered . No diarrhea, CP    No other complaints  Objective:     Patient Vitals for the past 24 hrs:   Temp Pulse Resp BP SpO2   20 0727 100.2 °F (37.9 °C) 85 22 (!) 142/89 96 %   20 0414 99.4 °F (37.4 °C) 81 26 (!) 135/90 92 %   20 0139 98.6 °F (37 °C) 78 24 139/84 92 %   20 0020  61 23 112/75 98 %   20 2359  62 24 113/67 97 %   20 2339  64  116/75 99 %   20 2320  68  118/73 99 %   20 2259  66 26 121/78 99 %   20 2152     97 %   20 2140 100.2 °F (37.9 °C) 75 22 127/79 93 %     Oxygen Therapy  O2 Sat (%): 96 % (20 0727)  Pulse via Oximetry: 61 beats per minute (20 0020)  O2 Device: Nasal cannula (20 015)  O2 Flow Rate (L/min): 3 l/min (20 015)    Estimated body mass index is 30.67 kg/m² as calculated from the following:    Height as of this encounter: 5' 6\" (1.676 m). Weight as of this encounter: 86.2 kg (190 lb). Intake/Output Summary (Last 24 hours) at 2020 0856  Last data filed at 2020 0848  Gross per 24 hour   Intake 1000 ml   Output 500 ml   Net 500 ml       *Note that automatically entered I/Os may not be accurate; dependent on patient compliance with collection and accurate  by techs. General:    Well nourished. Alert. CV:   RRR. No murmur, rub, or gallop.   Lungs: CTAB.  No wheezing, rhonchi, or rales. Abdomen:   Soft, nontender, nondistended. Extremities: Warm and dry. No cyanosis or edema. Skin:     No rashes or jaundice. Neuro:  No gross focal deficits    Data Reviewed:  I have reviewed all labs, meds, and studies from the last 24 hours:  Recent Results (from the past 24 hour(s))   EKG, 12 LEAD, INITIAL    Collection Time: 09/12/20  9:44 PM   Result Value Ref Range    Ventricular Rate 76 BPM    Atrial Rate 76 BPM    P-R Interval 174 ms    QRS Duration 92 ms    Q-T Interval 376 ms    QTC Calculation (Bezet) 423 ms    Calculated P Axis 50 degrees    Calculated R Axis 16 degrees    Calculated T Axis 43 degrees    Diagnosis       !! AGE AND GENDER SPECIFIC ECG ANALYSIS !! Normal sinus rhythm  Normal ECG     CBC WITH AUTOMATED DIFF    Collection Time: 09/12/20  9:53 PM   Result Value Ref Range    WBC 5.2 4.3 - 11.1 K/uL    RBC 4.39 4.23 - 5.6 M/uL    HGB 13.3 (L) 13.6 - 17.2 g/dL    HCT 40.2 (L) 41.1 - 50.3 %    MCV 91.6 79.6 - 97.8 FL    MCH 30.3 26.1 - 32.9 PG    MCHC 33.1 31.4 - 35.0 g/dL    RDW 13.3 11.9 - 14.6 %    PLATELET 244 959 - 846 K/uL    MPV 9.4 9.4 - 12.3 FL    ABSOLUTE NRBC 0.00 0.0 - 0.2 K/uL    DF AUTOMATED      NEUTROPHILS 68 43 - 78 %    LYMPHOCYTES 24 13 - 44 %    MONOCYTES 8 4.0 - 12.0 %    EOSINOPHILS 0 (L) 0.5 - 7.8 %    BASOPHILS 0 0.0 - 2.0 %    IMMATURE GRANULOCYTES 0 0.0 - 5.0 %    ABS. NEUTROPHILS 3.5 1.7 - 8.2 K/UL    ABS. LYMPHOCYTES 1.2 0.5 - 4.6 K/UL    ABS. MONOCYTES 0.4 0.1 - 1.3 K/UL    ABS. EOSINOPHILS 0.0 0.0 - 0.8 K/UL    ABS. BASOPHILS 0.0 0.0 - 0.2 K/UL    ABS. IMM.  GRANS. 0.0 0.0 - 0.5 K/UL   LACTIC ACID    Collection Time: 09/12/20  9:53 PM   Result Value Ref Range    Lactic acid 1.5 0.4 - 2.0 MMOL/L   METABOLIC PANEL, COMPREHENSIVE    Collection Time: 09/12/20  9:53 PM   Result Value Ref Range    Sodium 140 136 - 145 mmol/L    Potassium 3.8 3.5 - 5.1 mmol/L    Chloride 108 (H) 98 - 107 mmol/L    CO2 27 21 - 32 mmol/L    Anion gap 5 (L) 7 - 16 mmol/L    Glucose 100 65 - 100 mg/dL    BUN 8 6 - 23 MG/DL    Creatinine 0.88 0.8 - 1.5 MG/DL    GFR est AA >60 >60 ml/min/1.73m2    GFR est non-AA >60 >60 ml/min/1.73m2    Calcium 8.3 8.3 - 10.4 MG/DL    Bilirubin, total 0.3 0.2 - 1.1 MG/DL    ALT (SGPT) 82 (H) 12 - 65 U/L    AST (SGOT) 55 (H) 15 - 37 U/L    Alk. phosphatase 101 50 - 136 U/L    Protein, total 7.4 6.3 - 8.2 g/dL    Albumin 3.0 (L) 3.5 - 5.0 g/dL    Globulin 4.4 (H) 2.3 - 3.5 g/dL    A-G Ratio 0.7 (L) 1.2 - 3.5     METABOLIC PANEL, BASIC    Collection Time: 09/13/20  4:22 AM   Result Value Ref Range    Sodium 143 136 - 145 mmol/L    Potassium 4.0 3.5 - 5.1 mmol/L    Chloride 112 (H) 98 - 107 mmol/L    CO2 25 21 - 32 mmol/L    Anion gap 6 (L) 7 - 16 mmol/L    Glucose 104 (H) 65 - 100 mg/dL    BUN 7 6 - 23 MG/DL    Creatinine 0.69 (L) 0.8 - 1.5 MG/DL    GFR est AA >60 >60 ml/min/1.73m2    GFR est non-AA >60 >60 ml/min/1.73m2    Calcium 8.2 (L) 8.3 - 10.4 MG/DL   CBC WITH AUTOMATED DIFF    Collection Time: 09/13/20  4:22 AM   Result Value Ref Range    WBC 6.1 4.3 - 11.1 K/uL    RBC 4.24 4.23 - 5.6 M/uL    HGB 12.9 (L) 13.6 - 17.2 g/dL    HCT 38.6 (L) 41.1 - 50.3 %    MCV 91.0 79.6 - 97.8 FL    MCH 30.4 26.1 - 32.9 PG    MCHC 33.4 31.4 - 35.0 g/dL    RDW 13.3 11.9 - 14.6 %    PLATELET 559 934 - 288 K/uL    MPV 9.9 9.4 - 12.3 FL    ABSOLUTE NRBC 0.00 0.0 - 0.2 K/uL    DF AUTOMATED      NEUTROPHILS 70 43 - 78 %    LYMPHOCYTES 22 13 - 44 %    MONOCYTES 8 4.0 - 12.0 %    EOSINOPHILS 0 (L) 0.5 - 7.8 %    BASOPHILS 0 0.0 - 2.0 %    IMMATURE GRANULOCYTES 0 0.0 - 5.0 %    ABS. NEUTROPHILS 4.2 1.7 - 8.2 K/UL    ABS. LYMPHOCYTES 1.3 0.5 - 4.6 K/UL    ABS. MONOCYTES 0.5 0.1 - 1.3 K/UL    ABS. EOSINOPHILS 0.0 0.0 - 0.8 K/UL    ABS. BASOPHILS 0.0 0.0 - 0.2 K/UL    ABS. IMM.  GRANS. 0.0 0.0 - 0.5 K/UL   D DIMER    Collection Time: 09/13/20  4:22 AM   Result Value Ref Range    D DIMER 0.43 <0.56 ug/ml(FEU)   C REACTIVE PROTEIN, QT    Collection Time: 09/13/20  4:22 AM   Result Value Ref Range    C-Reactive protein 12.6 (H) 0.0 - 0.9 mg/dL   TYPE & SCREEN    Collection Time: 09/13/20  4:24 AM   Result Value Ref Range    Crossmatch Expiration 09/16/2020     ABO/Rh(D) Hilario Knott POSITIVE     Antibody screen NEG         Current Meds:  Current Facility-Administered Medications   Medication Dose Route Frequency    ascorbic acid (vitamin C) (VITAMIN C) tablet 1,000 mg  1,000 mg Oral DAILY    sodium chloride (NS) flush 5-40 mL  5-40 mL IntraVENous Q8H    sodium chloride (NS) flush 5-40 mL  5-40 mL IntraVENous PRN    acetaminophen (TYLENOL) tablet 650 mg  650 mg Oral Q6H PRN    Or    acetaminophen (TYLENOL) suppository 650 mg  650 mg Rectal Q6H PRN    polyethylene glycol (MIRALAX) packet 17 g  17 g Oral DAILY    promethazine (PHENERGAN) tablet 12.5 mg  12.5 mg Oral Q6H PRN    Or    ondansetron (ZOFRAN) injection 4 mg  4 mg IntraVENous Q6H PRN    famotidine (PEPCID) tablet 20 mg  20 mg Oral BID    enoxaparin (LOVENOX) injection 40 mg  40 mg SubCUTAneous DAILY    albuterol (PROVENTIL VENTOLIN) nebulizer solution 2.5 mg  2.5 mg Nebulization Q4H PRN    0.9% sodium chloride infusion 250 mL  250 mL IntraVENous PRN    albuterol (PROVENTIL HFA, VENTOLIN HFA, PROAIR HFA) inhaler 1 Puff  1 Puff Inhalation Q4H RT    guaiFENesin ER (MUCINEX) tablet 1,200 mg  1,200 mg Oral BID    dexAMETHasone (DECADRON) tablet 6 mg  6 mg Oral DAILY    guaiFENesin-dextromethorphan (ROBITUSSIN DM) 100-10 mg/5 mL syrup 10 mL  10 mL Oral Q4H PRN    zinc sulfate tablet 220 mg  220 mg Oral DAILY    HYDROcodone-homatropine (HYCODAN) 5-1.5 mg/5 mL (5 mL) oral solution 5 mL  5 mL Oral Q4H PRN    influenza vaccine 2020-21 (6 mos+)(PF) (FLUARIX/FLULAVAL/FLUZONE QUAD) injection 0.5 mL  0.5 mL IntraMUSCular PRIOR TO DISCHARGE    remdesivir 200 mg in 0.9% sodium chloride 250 mL IVPB  200 mg IntraVENous ONCE    Followed by   Constantine Rossi ON 9/14/2020] remdesivir 100 mg in 0.9% sodium chloride 250 mL IVPB 100 mg IntraVENous Q24H    0.9% sodium chloride infusion 30 mL  30 mL IntraVENous Q24H       Other Studies:  No results found for this visit on 09/12/20. Xr Chest Port    Result Date: 9/12/2020  EXAM: Chest x-ray. INDICATION: Dyspnea. Covid 19. COMPARISON: Yesterday's chest x-ray. TECHNIQUE: Frontal view chest x-ray. FINDINGS: There are progressed patchy diffuse bilateral lung infiltrates. Cardiac size and mediastinal contour are within normal limits. No pneumothorax or pleural effusion is seen. IMPRESSION: Progressed patchy bilateral lung infiltrates, suspect for acute pneumonia such as Covid 19.       All Micro Results     None          SARS-CoV-2 Lab Results  \"Novel Coronavirus\" Test: No results found for: COV2NT   \"Emergent Disease\" Test: No results found for: EDPR  \"SARS-COV-2\" Test: No results found for: XGCOVT  \"Precision Labs\" Test: No results found for: RSLT  Rapid Test: No results found for: COVR         Assessment and Plan:     Hospital Problems as of 9/13/2020 Date Reviewed: 9/13/2020          Codes Class Noted - Resolved POA    * (Principal) Acute respiratory failure with hypoxia ([facility] [address] ICD-10-CM: J96.01  ICD-9-CM: 518.81  9/13/2020 - Present Yes        COVID-19 virus infection ICD-10-CM: U07.1  ICD-9-CM: 079.89  9/12/2020 - Present Yes              Plan:  COVID  -decadron x10d EOT 9/22  -conv plasma ordered  -remdesivir x5d EOT 9/17  -albuterol HFA  -mucinex  -incentive torie  -mobilize   -suppress cough with PRNs if excessive  -DVT ppx:     DC planning/Dispo:    -home when improved    Diet:  DIET REGULAR      Signed:  Lala Piper MD

## 2020-09-14 LAB
ANION GAP SERPL CALC-SCNC: 6 MMOL/L (ref 7–16)
BASOPHILS # BLD: 0 K/UL (ref 0–0.2)
BASOPHILS NFR BLD: 0 % (ref 0–2)
BLD PROD TYP BPU: NORMAL
BPU ID: NORMAL
BUN SERPL-MCNC: 14 MG/DL (ref 6–23)
CALCIUM SERPL-MCNC: 8.6 MG/DL (ref 8.3–10.4)
CHLORIDE SERPL-SCNC: 108 MMOL/L (ref 98–107)
CO2 SERPL-SCNC: 26 MMOL/L (ref 21–32)
CREAT SERPL-MCNC: 0.73 MG/DL (ref 0.8–1.5)
DIFFERENTIAL METHOD BLD: ABNORMAL
EOSINOPHIL # BLD: 0 K/UL (ref 0–0.8)
EOSINOPHIL NFR BLD: 0 % (ref 0.5–7.8)
ERYTHROCYTE [DISTWIDTH] IN BLOOD BY AUTOMATED COUNT: 13.1 % (ref 11.9–14.6)
GLUCOSE SERPL-MCNC: 138 MG/DL (ref 65–100)
HCT VFR BLD AUTO: 37 % (ref 41.1–50.3)
HGB BLD-MCNC: 12.6 G/DL (ref 13.6–17.2)
IMM GRANULOCYTES # BLD AUTO: 0.1 K/UL (ref 0–0.5)
IMM GRANULOCYTES NFR BLD AUTO: 1 % (ref 0–5)
LYMPHOCYTES # BLD: 0.9 K/UL (ref 0.5–4.6)
LYMPHOCYTES NFR BLD: 14 % (ref 13–44)
MAGNESIUM SERPL-MCNC: 2.4 MG/DL (ref 1.8–2.4)
MCH RBC QN AUTO: 30.5 PG (ref 26.1–32.9)
MCHC RBC AUTO-ENTMCNC: 34.1 G/DL (ref 31.4–35)
MCV RBC AUTO: 89.6 FL (ref 79.6–97.8)
MONOCYTES # BLD: 0.6 K/UL (ref 0.1–1.3)
MONOCYTES NFR BLD: 10 % (ref 4–12)
NEUTS SEG # BLD: 4.7 K/UL (ref 1.7–8.2)
NEUTS SEG NFR BLD: 75 % (ref 43–78)
NRBC # BLD: 0 K/UL (ref 0–0.2)
PHOSPHATE SERPL-MCNC: 3.7 MG/DL (ref 2.5–4.5)
PLATELET # BLD AUTO: 201 K/UL (ref 150–450)
PLATELET COMMENTS,PCOM: ADEQUATE
PMV BLD AUTO: 9.7 FL (ref 9.4–12.3)
POTASSIUM SERPL-SCNC: 3.9 MMOL/L (ref 3.5–5.1)
RBC # BLD AUTO: 4.13 M/UL (ref 4.23–5.6)
RBC MORPH BLD: ABNORMAL
SODIUM SERPL-SCNC: 140 MMOL/L (ref 136–145)
STATUS OF UNIT,%ST: NORMAL
UNIT DIVISION, %UDIV: NORMAL
WBC # BLD AUTO: 6.3 K/UL (ref 4.3–11.1)
WBC MORPH BLD: ABNORMAL

## 2020-09-14 PROCEDURE — 2709999900 HC NON-CHARGEABLE SUPPLY

## 2020-09-14 PROCEDURE — 74011000258 HC RX REV CODE- 258: Performed by: INTERNAL MEDICINE

## 2020-09-14 PROCEDURE — 94640 AIRWAY INHALATION TREATMENT: CPT

## 2020-09-14 PROCEDURE — 85025 COMPLETE CBC W/AUTO DIFF WBC: CPT

## 2020-09-14 PROCEDURE — 74011250636 HC RX REV CODE- 250/636: Performed by: INTERNAL MEDICINE

## 2020-09-14 PROCEDURE — 83735 ASSAY OF MAGNESIUM: CPT

## 2020-09-14 PROCEDURE — 74011250637 HC RX REV CODE- 250/637: Performed by: FAMILY MEDICINE

## 2020-09-14 PROCEDURE — 80048 BASIC METABOLIC PNL TOTAL CA: CPT

## 2020-09-14 PROCEDURE — 74011000250 HC RX REV CODE- 250: Performed by: INTERNAL MEDICINE

## 2020-09-14 PROCEDURE — 84100 ASSAY OF PHOSPHORUS: CPT

## 2020-09-14 PROCEDURE — 74011250637 HC RX REV CODE- 250/637: Performed by: INTERNAL MEDICINE

## 2020-09-14 PROCEDURE — 65270000029 HC RM PRIVATE

## 2020-09-14 PROCEDURE — 74011250636 HC RX REV CODE- 250/636: Performed by: FAMILY MEDICINE

## 2020-09-14 PROCEDURE — 94762 N-INVAS EAR/PLS OXIMTRY CONT: CPT

## 2020-09-14 PROCEDURE — 77010033711 HC HIGH FLOW OXYGEN

## 2020-09-14 RX ORDER — ALBUTEROL SULFATE 90 UG/1
1 AEROSOL, METERED RESPIRATORY (INHALATION)
Status: DISCONTINUED | OUTPATIENT
Start: 2020-09-14 | End: 2020-09-18 | Stop reason: HOSPADM

## 2020-09-14 RX ADMIN — ALBUTEROL SULFATE 1 PUFF: 108 INHALANT RESPIRATORY (INHALATION) at 00:45

## 2020-09-14 RX ADMIN — Medication 10 ML: at 20:32

## 2020-09-14 RX ADMIN — SODIUM CHLORIDE 30 ML: 900 INJECTION, SOLUTION INTRAVENOUS at 15:49

## 2020-09-14 RX ADMIN — POLYETHYLENE GLYCOL 3350 17 G: 17 POWDER, FOR SOLUTION ORAL at 08:02

## 2020-09-14 RX ADMIN — ALBUTEROL SULFATE 1 PUFF: 108 INHALANT RESPIRATORY (INHALATION) at 03:50

## 2020-09-14 RX ADMIN — GUAIFENESIN 1200 MG: 600 TABLET ORAL at 08:02

## 2020-09-14 RX ADMIN — Medication 10 ML: at 06:00

## 2020-09-14 RX ADMIN — ALBUTEROL SULFATE 1 PUFF: 108 INHALANT RESPIRATORY (INHALATION) at 08:04

## 2020-09-14 RX ADMIN — Medication 1000 MG: at 08:03

## 2020-09-14 RX ADMIN — DEXAMETHASONE 6 MG: 4 TABLET ORAL at 08:03

## 2020-09-14 RX ADMIN — REMDESIVIR 100 MG: 100 INJECTION, POWDER, LYOPHILIZED, FOR SOLUTION INTRAVENOUS at 15:49

## 2020-09-14 RX ADMIN — FAMOTIDINE 20 MG: 20 TABLET, FILM COATED ORAL at 17:03

## 2020-09-14 RX ADMIN — FAMOTIDINE 20 MG: 20 TABLET, FILM COATED ORAL at 08:02

## 2020-09-14 RX ADMIN — Medication 10 ML: at 14:39

## 2020-09-14 RX ADMIN — Medication 220 MG: at 08:02

## 2020-09-14 RX ADMIN — GUAIFENESIN 1200 MG: 600 TABLET ORAL at 17:03

## 2020-09-14 RX ADMIN — ENOXAPARIN SODIUM 40 MG: 40 INJECTION SUBCUTANEOUS at 08:02

## 2020-09-14 RX ADMIN — ALBUTEROL SULFATE 1 PUFF: 108 INHALANT RESPIRATORY (INHALATION) at 20:15

## 2020-09-14 NOTE — PROGRESS NOTES
Hospitalist Note     Admit Date:  2020  9:35 PM   Name:  Heide Mario   Age:  53 y.o.  :  1970   MRN:  [id number]   PCP:  None  Treatment Team: Attending Provider: Daysi Carvajal MD; Utilization Review: Christina Avitia RN    HPI/Subjective:   Pt is a 54 y/o M admitted with COVID, hypoxia.  - worsening hypoxia, up to 9L today from 2L previously. Now on continuous pulse ox. Watching closely. Low grade temp. Plasma and remdesivir given this morning. Says cough is so frequent it is bothersome and contributing to SOB; hycodan ordered . No diarrhea, CP     - pt says he feels a little better but is still significantly SOB. Cough better, more productive. Fatigued. Fevers improved.   Denies diarrhea or other botherseom symptoms    No other complaints  Objective:     Patient Vitals for the past 24 hrs:   Temp Pulse Resp BP SpO2   20 0804     95 %   20 0800  (!) 49      20 0756  73      20 0721 99.1 °F (37.3 °C) (!) 54 19 103/65 91 %   20 0351     92 %   20 0327 98.1 °F (36.7 °C) 61 19 102/65 91 %   20 0045     95 %   20 2235 98.8 °F (37.1 °C) 60 19 101/65 94 %   20 2014     92 %   20 1937 98.5 °F (36.9 °C) 76 19 97/60 95 %   20 1624     93 %   20 1550     94 %   20 1530 98.8 °F (37.1 °C) 69 19 107/66 95 %   20 1245     95 %   20 1235     90 %   20 1215 99 °F (37.2 °C) 75 25 126/76 (!) 82 %   20 1144 100.2 °F (37.9 °C) 74 23 120/75 92 %   20 1055 100.3 °F (37.9 °C) 74 22 116/71 91 %   20 1038 99.7 °F (37.6 °C) 82 21 131/80 90 %     Oxygen Therapy  O2 Sat (%): 95 % (20)  Pulse via Oximetry: 70 beats per minute (20)  O2 Device: Hi flow nasal cannula;Humidifier (20)  O2 Flow Rate (L/min): 7 l/min(Weaned from 9L) (20)  FIO2 (%): 50 % (20 1550)    Estimated body mass index is 30.67 kg/m² as calculated from the following:    Height as of this encounter: 5' 6\" (1.676 m). Weight as of this encounter: 86.2 kg (190 lb). Intake/Output Summary (Last 24 hours) at 9/14/2020 0905  Last data filed at 9/13/2020 2253  Gross per 24 hour   Intake 307.5 ml   Output 1225 ml   Net -917.5 ml       *Note that automatically entered I/Os may not be accurate; dependent on patient compliance with collection and accurate  by techs. General:    Well nourished. Alert. Lungs:   No distress. Even unlabored on NC  Extremities: Warm and dry. No cyanosis or edema. Skin:     No rashes or jaundice.    Neuro:  No gross focal deficits    Data Reviewed:  I have reviewed all labs, meds, and studies from the last 24 hours:  Recent Results (from the past 24 hour(s))   METABOLIC PANEL, BASIC    Collection Time: 09/14/20  4:27 AM   Result Value Ref Range    Sodium 140 136 - 145 mmol/L    Potassium 3.9 3.5 - 5.1 mmol/L    Chloride 108 (H) 98 - 107 mmol/L    CO2 26 21 - 32 mmol/L    Anion gap 6 (L) 7 - 16 mmol/L    Glucose 138 (H) 65 - 100 mg/dL    BUN 14 6 - 23 MG/DL    Creatinine 0.73 (L) 0.8 - 1.5 MG/DL    GFR est AA >60 >60 ml/min/1.73m2    GFR est non-AA >60 >60 ml/min/1.73m2    Calcium 8.6 8.3 - 10.4 MG/DL   MAGNESIUM    Collection Time: 09/14/20  4:27 AM   Result Value Ref Range    Magnesium 2.4 1.8 - 2.4 mg/dL   CBC WITH AUTOMATED DIFF    Collection Time: 09/14/20  4:27 AM   Result Value Ref Range    WBC 6.3 4.3 - 11.1 K/uL    RBC 4.13 (L) 4.23 - 5.6 M/uL    HGB 12.6 (L) 13.6 - 17.2 g/dL    HCT 37.0 (L) 41.1 - 50.3 %    MCV 89.6 79.6 - 97.8 FL    MCH 30.5 26.1 - 32.9 PG    MCHC 34.1 31.4 - 35.0 g/dL    RDW 13.1 11.9 - 14.6 %    PLATELET 170 889 - 052 K/uL    MPV 9.7 9.4 - 12.3 FL    ABSOLUTE NRBC 0.00 0.0 - 0.2 K/uL    DF PENDING    PHOSPHORUS    Collection Time: 09/14/20  4:27 AM   Result Value Ref Range    Phosphorus 3.7 2.5 - 4.5 MG/DL        Current Meds:  Current Facility-Administered Medications   Medication Dose Route Frequency    ascorbic acid (vitamin C) (VITAMIN C) tablet 1,000 mg  1,000 mg Oral DAILY    sodium chloride (NS) flush 5-40 mL  5-40 mL IntraVENous Q8H    sodium chloride (NS) flush 5-40 mL  5-40 mL IntraVENous PRN    acetaminophen (TYLENOL) tablet 650 mg  650 mg Oral Q6H PRN    Or    acetaminophen (TYLENOL) suppository 650 mg  650 mg Rectal Q6H PRN    polyethylene glycol (MIRALAX) packet 17 g  17 g Oral DAILY    promethazine (PHENERGAN) tablet 12.5 mg  12.5 mg Oral Q6H PRN    Or    ondansetron (ZOFRAN) injection 4 mg  4 mg IntraVENous Q6H PRN    famotidine (PEPCID) tablet 20 mg  20 mg Oral BID    enoxaparin (LOVENOX) injection 40 mg  40 mg SubCUTAneous DAILY    albuterol (PROVENTIL VENTOLIN) nebulizer solution 2.5 mg  2.5 mg Nebulization Q4H PRN    0.9% sodium chloride infusion 250 mL  250 mL IntraVENous PRN    albuterol (PROVENTIL HFA, VENTOLIN HFA, PROAIR HFA) inhaler 1 Puff  1 Puff Inhalation Q4H RT    guaiFENesin ER (MUCINEX) tablet 1,200 mg  1,200 mg Oral BID    dexAMETHasone (DECADRON) tablet 6 mg  6 mg Oral DAILY    guaiFENesin-dextromethorphan (ROBITUSSIN DM) 100-10 mg/5 mL syrup 10 mL  10 mL Oral Q4H PRN    zinc sulfate tablet 220 mg  220 mg Oral DAILY    HYDROcodone-homatropine (HYCODAN) 5-1.5 mg/5 mL (5 mL) oral solution 5 mL  5 mL Oral Q4H PRN    influenza vaccine 2020-21 (6 mos+)(PF) (FLUARIX/FLULAVAL/FLUZONE QUAD) injection 0.5 mL  0.5 mL IntraMUSCular PRIOR TO DISCHARGE    remdesivir 100 mg in 0.9% sodium chloride 250 mL IVPB  100 mg IntraVENous Q24H    0.9% sodium chloride infusion 30 mL  30 mL IntraVENous Q24H       Other Studies:  No results found for this visit on 09/12/20. No results found.     All Micro Results     None          SARS-CoV-2 Lab Results  \"Novel Coronavirus\" Test: No results found for: COV2NT   \"Emergent Disease\" Test: No results found for: EDPR  \"SARS-COV-2\" Test: No results found for: XGCOVT  \"Precision Labs\" Test: No results found for: RSLT  Rapid Test: No results found for: COVR         Assessment and Plan:     Hospital Problems as of 9/14/2020 Date Reviewed: 9/13/2020          Codes Class Noted - Resolved POA    * (Principal) Acute respiratory failure with hypoxia ([facility] [address] ICD-10-CM: J96.01  ICD-9-CM: 518.81  9/13/2020 - Present Yes        COVID-19 virus infection ICD-10-CM: U07.1  ICD-9-CM: 079.89  9/12/2020 - Present Yes              Plan:  [place]  -completed conv plasma 9/13  -remdesivir x5d EOT 9/17  -decadron x10d EOT 9/22  -albuterol HFA  -mucinex  -incentive torie  -mobilize   -suppress cough with PRNs if excessive  -DVT ppx:  lovenox    DC planning/Dispo:    -home when improved    Diet:  DIET REGULAR      Signed:  Krystina Garrison MD

## 2020-09-14 NOTE — ACP (ADVANCE CARE PLANNING)
Attempted to follow up about inquiry for directives  Patient did not answer phone    Dimitri Hogue, staff

## 2020-09-14 NOTE — PROGRESS NOTES
Patient remains in stable condition with respirations even/unlabored. No acute distress noted. No needs noted or voiced at this time. Safety measures in place. Oxygen noted to 5 L NC. Continuous pulse ox noted. Call light remains within reach. Preparing to give report to oncoming shift.

## 2020-09-14 NOTE — PROGRESS NOTES
Patient has slept well through out the night  Respirations are even on 9LNCHF  There are no signs of immediate distress at this time  Will give BSR to oncoming RN upon arrival

## 2020-09-14 NOTE — PROGRESS NOTES
Patient darling called and stated that her Aunt told her that over night the patient's vital signs were \"bad\" and that she needed to check on him. Nothing given in report about anything other than a low heart rate at one point. Chart reviewed. No low heart rate's were documented overnight. No \"bad\" vital signs charted. Only heart rates below 60 were noted to be this morning. Darling verbalized understanding and thanked nurse.

## 2020-09-14 NOTE — PROGRESS NOTES
Spoke with  services. Patient verbalized no need for  but will call if nurse or MD feels there is any need or questions of understanding. Nurse appreciates  offer and cooperation.

## 2020-09-14 NOTE — PROGRESS NOTES
Hospital  is available for over-the-phone language services.  Please call Shraddha at [phone]  between 8:00 am-4:30 pm for all requests       Thank you,           Shraddha Rivera  [address]  [phone] (phone)

## 2020-09-14 NOTE — PROGRESS NOTES
Problem: Falls - Risk of  Goal: *Absence of Falls  Description: Document John Paul Reid Fall Risk and appropriate interventions in the flowsheet.   9/14/2020 1519 by Herman FIGUEREDO  Outcome: Progressing Towards Goal  Note: Fall Risk Interventions:            Medication Interventions: Bed/chair exit alarm                9/14/2020 1518 by Herman FIGUEREDO  Outcome: Progressing Towards Goal  Note: Fall Risk Interventions:            Medication Interventions: Bed/chair exit alarm                   Problem: Gas Exchange - Impaired  Goal: Absence of hypoxia  9/14/2020 1519 by Herman FIGUEREDO  Outcome: Progressing Towards Goal  9/14/2020 1518 by Herman FIGUEREDO  Outcome: Progressing Towards Goal     Problem: Gas Exchange - Impaired  Goal: Promote optimal lung function  9/14/2020 1519 by Herman FIGUEREDO  Outcome: Progressing Towards Goal  9/14/2020 1518 by Herman FIGUEREDO  Outcome: Progressing Towards Goal     Problem: Isolation Precautions - Risk of Spread of Infection  Goal: Prevent transmission of infectious organism to others  9/14/2020 1519 by Herman FIGUEREDO  Outcome: Progressing Towards Goal  9/14/2020 1518 by Herman FIGUEREDO  Outcome: Progressing Towards Goal

## 2020-09-14 NOTE — PROGRESS NOTES
CAITY from [facility], [address] [place] [address]. Patient in stable condition with resps even/unlabored. NAD noted. Patient on high flow nasal cannula. Continuous pulse ox in place. Safety measures noted. Will continue to monitor per policy. Droplet plus precautions intact. Appropriate PPE available and in use.

## 2020-09-14 NOTE — PROGRESS NOTES
rounded with Staff Julia Del Rosario RN) and patient. The patient states that he speaks english and that doesn't need an . Offered Language services as needed.       Thank you,            Shraddha [facility] [address] [place] [address]  [phone] (phone)

## 2020-09-15 LAB
ALBUMIN SERPL-MCNC: 2.7 G/DL (ref 3.5–5)
ALBUMIN/GLOB SERPL: 0.6 {RATIO} (ref 1.2–3.5)
ALP SERPL-CCNC: 92 U/L (ref 50–136)
ALT SERPL-CCNC: 61 U/L (ref 12–65)
ANION GAP SERPL CALC-SCNC: 8 MMOL/L (ref 7–16)
AST SERPL-CCNC: 39 U/L (ref 15–37)
BILIRUB SERPL-MCNC: 0.3 MG/DL (ref 0.2–1.1)
BUN SERPL-MCNC: 20 MG/DL (ref 6–23)
CALCIUM SERPL-MCNC: 8.5 MG/DL (ref 8.3–10.4)
CHLORIDE SERPL-SCNC: 107 MMOL/L (ref 98–107)
CO2 SERPL-SCNC: 25 MMOL/L (ref 21–32)
CREAT SERPL-MCNC: 0.81 MG/DL (ref 0.8–1.5)
GLOBULIN SER CALC-MCNC: 4.6 G/DL (ref 2.3–3.5)
GLUCOSE SERPL-MCNC: 129 MG/DL (ref 65–100)
POTASSIUM SERPL-SCNC: 4.2 MMOL/L (ref 3.5–5.1)
PROT SERPL-MCNC: 7.3 G/DL (ref 6.3–8.2)
SODIUM SERPL-SCNC: 140 MMOL/L (ref 136–145)

## 2020-09-15 PROCEDURE — 94640 AIRWAY INHALATION TREATMENT: CPT

## 2020-09-15 PROCEDURE — 94762 N-INVAS EAR/PLS OXIMTRY CONT: CPT

## 2020-09-15 PROCEDURE — 74011000250 HC RX REV CODE- 250: Performed by: INTERNAL MEDICINE

## 2020-09-15 PROCEDURE — 74011250637 HC RX REV CODE- 250/637: Performed by: INTERNAL MEDICINE

## 2020-09-15 PROCEDURE — 93005 ELECTROCARDIOGRAM TRACING: CPT | Performed by: HOSPITALIST

## 2020-09-15 PROCEDURE — 74011250636 HC RX REV CODE- 250/636: Performed by: INTERNAL MEDICINE

## 2020-09-15 PROCEDURE — 74011250637 HC RX REV CODE- 250/637: Performed by: FAMILY MEDICINE

## 2020-09-15 PROCEDURE — 65270000029 HC RM PRIVATE

## 2020-09-15 PROCEDURE — 77010033711 HC HIGH FLOW OXYGEN

## 2020-09-15 PROCEDURE — 74011250636 HC RX REV CODE- 250/636: Performed by: FAMILY MEDICINE

## 2020-09-15 PROCEDURE — 74011000258 HC RX REV CODE- 258: Performed by: INTERNAL MEDICINE

## 2020-09-15 PROCEDURE — 80053 COMPREHEN METABOLIC PANEL: CPT

## 2020-09-15 RX ADMIN — ALBUTEROL SULFATE 1 PUFF: 108 INHALANT RESPIRATORY (INHALATION) at 07:45

## 2020-09-15 RX ADMIN — ENOXAPARIN SODIUM 40 MG: 40 INJECTION SUBCUTANEOUS at 09:14

## 2020-09-15 RX ADMIN — FAMOTIDINE 20 MG: 20 TABLET, FILM COATED ORAL at 09:14

## 2020-09-15 RX ADMIN — GUAIFENESIN 1200 MG: 600 TABLET ORAL at 16:26

## 2020-09-15 RX ADMIN — SODIUM CHLORIDE 30 ML: 900 INJECTION, SOLUTION INTRAVENOUS at 16:25

## 2020-09-15 RX ADMIN — Medication 220 MG: at 09:15

## 2020-09-15 RX ADMIN — REMDESIVIR 100 MG: 100 INJECTION, POWDER, LYOPHILIZED, FOR SOLUTION INTRAVENOUS at 16:25

## 2020-09-15 RX ADMIN — Medication 1000 MG: at 09:00

## 2020-09-15 RX ADMIN — FAMOTIDINE 20 MG: 20 TABLET, FILM COATED ORAL at 16:26

## 2020-09-15 RX ADMIN — Medication 10 ML: at 06:09

## 2020-09-15 RX ADMIN — ALBUTEROL SULFATE 1 PUFF: 108 INHALANT RESPIRATORY (INHALATION) at 19:58

## 2020-09-15 RX ADMIN — Medication 10 ML: at 21:39

## 2020-09-15 RX ADMIN — Medication 10 ML: at 06:13

## 2020-09-15 RX ADMIN — DEXAMETHASONE 6 MG: 4 TABLET ORAL at 09:13

## 2020-09-15 RX ADMIN — Medication 10 ML: at 16:25

## 2020-09-15 RX ADMIN — GUAIFENESIN 1200 MG: 600 TABLET ORAL at 09:15

## 2020-09-15 NOTE — PROGRESS NOTES
Name: Durga Acuna MRN: [id number]  : 1970  Age:50 y.o.  male  Admit Date:  2020 LOS: 3      Hospitalist Progress Note     Reason for Admission:  COVID-19 virus infection [U07.1]    Hospital Course:  Durga Acuna is a 53 y.o. male with no significant medical history who is admitted due to worsening SOB with COVID+. Subjective/24 hr Events (09/15/20) :  Patient is seen and examined at bedside. No acute events reported overnight by nursing staff. Reports feeling better. No acute complaints at this time. Patient denies fever, chills, chest pains, shortness of breath, n/v, abdominal pain. ROS: 10 point review of systems is otherwise negative with the exception of the elements mentioned above. Objective:    Patient Vitals for the past 24 hrs:   Temp Pulse Resp BP SpO2   09/15/20 1547 98.5 °F (36.9 °C) 71 17 108/70 93 %   09/15/20 1127 97.8 °F (36.6 °C) 80 19 122/81 92 %   09/15/20 0745     92 %   09/15/20 0722 98.1 °F (36.7 °C) (!) 54 19 119/70 92 %   09/15/20 0355 97.5 °F (36.4 °C) (!) 54 18 117/72 91 %   09/15/20 0248     92 %   20 2328 97.9 °F (36.6 °C) 61 18 114/79 (!) 89 %   20     90 %   20 1941 97.9 °F (36.6 °C) 68 18 122/78 92 %     Oxygen Therapy  O2 Sat (%): 93 % (09/15/20 1547)  Pulse via Oximetry: 50 beats per minute (09/15/20 0745)  O2 Device: Hi flow nasal cannula(present on assessment) (09/15/20 0800)  O2 Flow Rate (L/min): 5 l/min(present on assessment) (09/15/20 0800)  FIO2 (%): 50 % (20 1550)    Estimated body mass index is 30.67 kg/m² as calculated from the following:    Height as of this encounter: 5' 6\" (1.676 m). Weight as of this encounter: 86.2 kg (190 lb).       Intake/Output Summary (Last 24 hours) at 9/15/2020 1657  Last data filed at 9/15/2020 1129  Gross per 24 hour   Intake 100 ml   Output 1475 ml   Net -1375 ml       *Note that automatically entered I/Os may not be accurate; dependent on patient compliance with collection and accurate  by [employer]. Physical Exam:   General:     alert, awake, no acute distress. Head:   normocephalic, atraumatic  Eyes, Ears, nose: PERRL, EOMI. Normal conjunctiva  Neck:    supple, non-tender. Trachea midline. Lungs:   CTAB, no wheezing, rhonchi, rales  Cardiac:   RRR, Normal S1 and S2. Abdomen:   Soft, non distended, nontender, +BS  Extremities:   Warm, dry. No edema   Skin:   No rashes, no jaundice  Neuro:  AAOx3. No gross focal neurological deficit  Psychiatric:  No anxiety, calm, cooperative    Data Review:  I have reviewed all labs, meds, and studies from the last 24 hours:      Labs:    Recent Results (from the past 24 hour(s))   METABOLIC PANEL, COMPREHENSIVE    Collection Time: 09/15/20  3:18 AM   Result Value Ref Range    Sodium 140 136 - 145 mmol/L    Potassium 4.2 3.5 - 5.1 mmol/L    Chloride 107 98 - 107 mmol/L    CO2 25 21 - 32 mmol/L    Anion gap 8 7 - 16 mmol/L    Glucose 129 (H) 65 - 100 mg/dL    BUN 20 6 - 23 MG/DL    Creatinine 0.81 0.8 - 1.5 MG/DL    GFR est AA >60 >60 ml/min/1.73m2    GFR est non-AA >60 >60 ml/min/1.73m2    Calcium 8.5 8.3 - 10.4 MG/DL    Bilirubin, total 0.3 0.2 - 1.1 MG/DL    ALT (SGPT) 61 12 - 65 U/L    AST (SGOT) 39 (H) 15 - 37 U/L    Alk.  phosphatase 92 50 - 136 U/L    Protein, total 7.3 6.3 - 8.2 g/dL    Albumin 2.7 (L) 3.5 - 5.0 g/dL    Globulin 4.6 (H) 2.3 - 3.5 g/dL    A-G Ratio 0.6 (L) 1.2 - 3.5           All Micro Results     None          Current Meds:  Current Facility-Administered Medications   Medication Dose Route Frequency    albuterol (PROVENTIL HFA, VENTOLIN HFA, PROAIR HFA) inhaler 1 Puff  1 Puff Inhalation BID RT    ascorbic acid (vitamin C) (VITAMIN C) tablet 1,000 mg  1,000 mg Oral DAILY    sodium chloride (NS) flush 5-40 mL  5-40 mL IntraVENous Q8H    sodium chloride (NS) flush 5-40 mL  5-40 mL IntraVENous PRN    acetaminophen (TYLENOL) tablet 650 mg  650 mg Oral Q6H PRN    Or    acetaminophen (TYLENOL) suppository 650 mg  650 mg Rectal Q6H PRN    polyethylene glycol (MIRALAX) packet 17 g  17 g Oral DAILY    promethazine (PHENERGAN) tablet 12.5 mg  12.5 mg Oral Q6H PRN    Or    ondansetron (ZOFRAN) injection 4 mg  4 mg IntraVENous Q6H PRN    famotidine (PEPCID) tablet 20 mg  20 mg Oral BID    enoxaparin (LOVENOX) injection 40 mg  40 mg SubCUTAneous DAILY    albuterol (PROVENTIL VENTOLIN) nebulizer solution 2.5 mg  2.5 mg Nebulization Q4H PRN    0.9% sodium chloride infusion 250 mL  250 mL IntraVENous PRN    guaiFENesin ER (MUCINEX) tablet 1,200 mg  1,200 mg Oral BID    dexAMETHasone (DECADRON) tablet 6 mg  6 mg Oral DAILY    guaiFENesin-dextromethorphan (ROBITUSSIN DM) 100-10 mg/5 mL syrup 10 mL  10 mL Oral Q4H PRN    zinc sulfate tablet 220 mg  220 mg Oral DAILY    HYDROcodone-homatropine (HYCODAN) 5-1.5 mg/5 mL (5 mL) oral solution 5 mL  5 mL Oral Q4H PRN    influenza vaccine 2020-21 (6 mos+)(PF) (FLUARIX/FLULAVAL/FLUZONE QUAD) injection 0.5 mL  0.5 mL IntraMUSCular PRIOR TO DISCHARGE    remdesivir 100 mg in 0.9% sodium chloride 250 mL IVPB  100 mg IntraVENous Q24H    0.9% sodium chloride infusion 30 mL  30 mL IntraVENous Q24H         Other Studies:  Xr Chest Port    Result Date: 9/12/2020  EXAM: Chest x-ray. INDICATION: Dyspnea. Covid 19. COMPARISON: Yesterday's chest x-ray. TECHNIQUE: Frontal view chest x-ray. FINDINGS: There are progressed patchy diffuse bilateral lung infiltrates. Cardiac size and mediastinal contour are within normal limits. No pneumothorax or pleural effusion is seen. IMPRESSION: Progressed patchy bilateral lung infiltrates, suspect for acute pneumonia such as Covid 19.          Assessment:    Active Hospital Problems    Diagnosis Date Noted    Acute respiratory failure with hypoxia ([facility] [address] 09/13/2020    COVID-19 virus infection 09/12/2020       Plan:    Acute Respiratory failure with hypoxia  COVID 19 infection  - continue with remdesivir (EOT 9/17)  - continue with decadron (EOT 9/22)  - O2 supplement and wean as tolerated  - continue with zinc and vitamin C  - continue with mucinex, incentive spirometry    Hypoalbuminemia  - albumin of 2.7  - Nutrition consult    Diet:  DIET REGULAR  DVT PPx: Lovenox  Code: Full Code  Dispo: pending clinical course and PT/OT Eval  Estimated Discharge: TBD based on clinical course    Labs/Imaging Reviewed. Patient is HIGH risk due to current condition and comorbid conditions as well as requiring frequent monitoring and high risk of decline. Plan discussed with staff, patient/family and are in agreement.       Signed By: Marvin Kemp MD     September 15, 2020

## 2020-09-15 NOTE — PROGRESS NOTES
Patient has rested at long intervals overnight. He has been observed during hourly rounds and has been asleep almost full shift. His O2 sat went down to 88% and this nurse raised his oxygen back to 7l/min NC and he came back up to 92-94%. Monitor room called that patient's heart rate got as low as 44 bpm overnight and I perfect Served Dr. Ailene Baumgarten will order a routine EKG this am.  Patient has been asymptomatic overnight and this nurse spoke with him about bradycardia and patient stated \"it hasn't caused me any problems\". I spoke with him regarding follow up with his PCP after discharge and he seemed open to this. Will continue to monitor and assist as needed, and will prepare to give report to oncoming nurse.

## 2020-09-15 NOTE — ACP (ADVANCE CARE PLANNING)
attempted to follow up with ACP consult request by phone a number of times this afternoon/evening, patient did not answer.     Burton KRAMERN

## 2020-09-16 LAB
ALBUMIN SERPL-MCNC: 2.8 G/DL (ref 3.5–5)
ALBUMIN/GLOB SERPL: 0.6 {RATIO} (ref 1.2–3.5)
ALP SERPL-CCNC: 91 U/L (ref 50–136)
ALT SERPL-CCNC: 59 U/L (ref 12–65)
ANION GAP SERPL CALC-SCNC: 8 MMOL/L (ref 7–16)
AST SERPL-CCNC: 35 U/L (ref 15–37)
ATRIAL RATE: 51 BPM
BACTERIA SPEC CULT: NORMAL
BACTERIA SPEC CULT: NORMAL
BILIRUB SERPL-MCNC: 0.3 MG/DL (ref 0.2–1.1)
BUN SERPL-MCNC: 20 MG/DL (ref 6–23)
CALCIUM SERPL-MCNC: 8.7 MG/DL (ref 8.3–10.4)
CALCULATED P AXIS, ECG09: 39 DEGREES
CALCULATED R AXIS, ECG10: 8 DEGREES
CALCULATED T AXIS, ECG11: 37 DEGREES
CHLORIDE SERPL-SCNC: 107 MMOL/L (ref 98–107)
CO2 SERPL-SCNC: 25 MMOL/L (ref 21–32)
CREAT SERPL-MCNC: 0.78 MG/DL (ref 0.8–1.5)
DIAGNOSIS, 93000: NORMAL
GLOBULIN SER CALC-MCNC: 4.6 G/DL (ref 2.3–3.5)
GLUCOSE SERPL-MCNC: 120 MG/DL (ref 65–100)
P-R INTERVAL, ECG05: 156 MS
POTASSIUM SERPL-SCNC: 4 MMOL/L (ref 3.5–5.1)
PROT SERPL-MCNC: 7.4 G/DL (ref 6.3–8.2)
Q-T INTERVAL, ECG07: 472 MS
QRS DURATION, ECG06: 106 MS
QTC CALCULATION (BEZET), ECG08: 435 MS
SERVICE CMNT-IMP: NORMAL
SERVICE CMNT-IMP: NORMAL
SODIUM SERPL-SCNC: 140 MMOL/L (ref 136–145)
VENTRICULAR RATE, ECG03: 51 BPM

## 2020-09-16 PROCEDURE — 77010033678 HC OXYGEN DAILY

## 2020-09-16 PROCEDURE — 65270000029 HC RM PRIVATE

## 2020-09-16 PROCEDURE — 93005 ELECTROCARDIOGRAM TRACING: CPT | Performed by: INTERNAL MEDICINE

## 2020-09-16 PROCEDURE — 74011250637 HC RX REV CODE- 250/637: Performed by: INTERNAL MEDICINE

## 2020-09-16 PROCEDURE — 80053 COMPREHEN METABOLIC PANEL: CPT

## 2020-09-16 PROCEDURE — 74011000258 HC RX REV CODE- 258: Performed by: INTERNAL MEDICINE

## 2020-09-16 PROCEDURE — 74011250637 HC RX REV CODE- 250/637: Performed by: FAMILY MEDICINE

## 2020-09-16 PROCEDURE — 94640 AIRWAY INHALATION TREATMENT: CPT

## 2020-09-16 PROCEDURE — 74011250636 HC RX REV CODE- 250/636: Performed by: FAMILY MEDICINE

## 2020-09-16 PROCEDURE — 94760 N-INVAS EAR/PLS OXIMETRY 1: CPT

## 2020-09-16 PROCEDURE — 74011250636 HC RX REV CODE- 250/636: Performed by: INTERNAL MEDICINE

## 2020-09-16 PROCEDURE — 74011000250 HC RX REV CODE- 250: Performed by: INTERNAL MEDICINE

## 2020-09-16 RX ADMIN — Medication 10 ML: at 20:30

## 2020-09-16 RX ADMIN — ALBUTEROL SULFATE 1 PUFF: 108 INHALANT RESPIRATORY (INHALATION) at 21:45

## 2020-09-16 RX ADMIN — Medication 1000 MG: at 09:01

## 2020-09-16 RX ADMIN — FAMOTIDINE 20 MG: 20 TABLET, FILM COATED ORAL at 17:50

## 2020-09-16 RX ADMIN — ENOXAPARIN SODIUM 40 MG: 40 INJECTION SUBCUTANEOUS at 09:01

## 2020-09-16 RX ADMIN — Medication 220 MG: at 09:02

## 2020-09-16 RX ADMIN — GUAIFENESIN 1200 MG: 600 TABLET ORAL at 09:02

## 2020-09-16 RX ADMIN — GUAIFENESIN 1200 MG: 600 TABLET ORAL at 17:50

## 2020-09-16 RX ADMIN — ALBUTEROL SULFATE 1 PUFF: 108 INHALANT RESPIRATORY (INHALATION) at 07:39

## 2020-09-16 RX ADMIN — FAMOTIDINE 20 MG: 20 TABLET, FILM COATED ORAL at 09:02

## 2020-09-16 RX ADMIN — DEXAMETHASONE 6 MG: 4 TABLET ORAL at 09:01

## 2020-09-16 RX ADMIN — SODIUM CHLORIDE 30 ML: 900 INJECTION, SOLUTION INTRAVENOUS at 16:08

## 2020-09-16 RX ADMIN — REMDESIVIR 100 MG: 100 INJECTION, POWDER, LYOPHILIZED, FOR SOLUTION INTRAVENOUS at 16:08

## 2020-09-16 RX ADMIN — Medication 10 ML: at 06:29

## 2020-09-16 RX ADMIN — Medication 10 ML: at 16:08

## 2020-09-16 NOTE — PROGRESS NOTES
Remote telemetry tech called for heart rate 43 with sinus kong rhythm. Patient without any complaints. Updated MD. Cardiology consult noted.

## 2020-09-16 NOTE — PROGRESS NOTES
Virtual rounding, Interpreting services  offered to assist with any requests.  available through video or over the phone [phone].       Thank you,      Raymond Lu, [address]  [phone] (phone)

## 2020-09-16 NOTE — PROGRESS NOTES
Monitor room has called x 2 to inform staff that patients heart rate is 38 bpm.  Will continue to monitor and assist patient as needed.

## 2020-09-16 NOTE — ACP (ADVANCE CARE PLANNING)
brought patient a [place] language copy of a [address] Left form with unit secretary to be taken to patient. Available for further assistance as needed.     Kristal Martinez PRN  [phone]

## 2020-09-16 NOTE — PROGRESS NOTES
Name: Saadia Person MRN: [id number]  : 1970  Age:50 y.o.  male  Admit Date:  2020 LOS: 4      Hospitalist Progress Note     Reason for Admission:  COVID-19 virus infection [U07.1]    Hospital Course:  Saadia Person is a 53 y.o. male with no significant medical history who is admitted due to worsening SOB with COVID+. Subjective/24 hr Events (20) :  Patient is seen and examined at bedside. Patient was noted to be bradycardic with heart rates as low as mid to high 30s. Patient was asymptomatic and sleeping at that time. Patient reports that he is feeling better. He is able to eat more today but reports feeling weak. Per nursing staff patient was weak when he stands up to use the urinal.    Patient remains on 4 to 5 L high flow nasal cannula with saturation in the low 90s. Encouraged use of incentive spirometer. Patient denies fever, chills, chest pains, n/v, abdominal pain. ROS: 10 point review of systems is otherwise negative with the exception of the elements mentioned above.     Objective:    Patient Vitals for the past 24 hrs:   Temp Pulse Resp BP SpO2   20 1529 98.1 °F (36.7 °C) 75 20 (!) 105/58 95 %   20 1330     96 %   20 1140 97.7 °F (36.5 °C) (!) 53 18 113/70 96 %   20 1121     92 %   20 0819 97.7 °F (36.5 °C) 77 18 (!) 96/58 90 %   20 0818    (!) 90/58    20 0739     94 %   20 0440 97.4 °F (36.3 °C) (!) 42 18 (!) 102/58 97 %   09/15/20 2333 98.2 °F (36.8 °C) 89 19 120/72 90 %   09/15/20 2001 98.3 °F (36.8 °C) 73 19 106/66 91 %   09/15/20 1958     94 %     Oxygen Therapy  O2 Sat (%): 95 % (20 1529)  Pulse via Oximetry: 56 beats per minute (20 0739)  O2 Device: Nasal cannula (20 1330)  O2 Flow Rate (L/min): 5 l/min (20 1330)  O2 Temperature: 41 °F (5 °C) (09/15/20 1912)  FIO2 (%): 50 % (20 1550)    Estimated body mass index is 30.67 kg/m² as calculated from the following:    Height as of this encounter: 5' 6\" (1.676 m). Weight as of this encounter: 86.2 kg (190 lb). Intake/Output Summary (Last 24 hours) at 9/16/2020 1839  Last data filed at 9/16/2020 1819  Gross per 24 hour   Intake 420 ml   Output 800 ml   Net -380 ml       *Note that automatically entered I/Os may not be accurate; dependent on patient compliance with collection and accurate  by techs. Physical Exam:   General:     alert, awake, no acute distress. Head:   normocephalic, atraumatic  Eyes, Ears, nose: PERRL, EOMI. Normal conjunctiva  Neck:    supple, non-tender. Trachea midline. Lungs:   CTAB, no wheezing, rhonchi, rales  Cardiac:   RRR, Normal S1 and S2. Abdomen:   Soft, non distended, nontender, +BS  Extremities:   Warm, dry. No edema   Skin:   No rashes, no jaundice  Neuro:  AAOx3. No gross focal neurological deficit  Psychiatric:  No anxiety, calm, cooperative    Data Review:  I have reviewed all labs, meds, and studies from the last 24 hours:      Labs:    Recent Results (from the past 24 hour(s))   METABOLIC PANEL, COMPREHENSIVE    Collection Time: 09/16/20  3:28 AM   Result Value Ref Range    Sodium 140 136 - 145 mmol/L    Potassium 4.0 3.5 - 5.1 mmol/L    Chloride 107 98 - 107 mmol/L    CO2 25 21 - 32 mmol/L    Anion gap 8 7 - 16 mmol/L    Glucose 120 (H) 65 - 100 mg/dL    BUN 20 6 - 23 MG/DL    Creatinine 0.78 (L) 0.8 - 1.5 MG/DL    GFR est AA >60 >60 ml/min/1.73m2    GFR est non-AA >60 >60 ml/min/1.73m2    Calcium 8.7 8.3 - 10.4 MG/DL    Bilirubin, total 0.3 0.2 - 1.1 MG/DL    ALT (SGPT) 59 12 - 65 U/L    AST (SGOT) 35 15 - 37 U/L    Alk.  phosphatase 91 50 - 136 U/L    Protein, total 7.4 6.3 - 8.2 g/dL    Albumin 2.8 (L) 3.5 - 5.0 g/dL    Globulin 4.6 (H) 2.3 - 3.5 g/dL    A-G Ratio 0.6 (L) 1.2 - 3.5     EKG, 12 LEAD, SUBSEQUENT    Collection Time: 09/16/20 12:12 PM   Result Value Ref Range    Ventricular Rate 51 BPM    Atrial Rate 51 BPM    P-R Interval 156 ms QRS Duration 106 ms    Q-T Interval 472 ms    QTC Calculation (Bezet) 435 ms    Calculated P Axis 39 degrees    Calculated R Axis 8 degrees    Calculated T Axis 37 degrees    Diagnosis       Sinus bradycardia with sinus arrhythmia  Otherwise normal ECG  When compared with ECG of 12-SEP-2020 21:44,  Vent.  rate has decreased BY  25 BPM  Confirmed by MARY RUIZ (), Rc Pion ([id number]) on 9/16/2020 2:51:26 PM           All Micro Results     None          Current Meds:  Current Facility-Administered Medications   Medication Dose Route Frequency    albuterol (PROVENTIL HFA, VENTOLIN HFA, PROAIR HFA) inhaler 1 Puff  1 Puff Inhalation BID RT    ascorbic acid (vitamin C) (VITAMIN C) tablet 1,000 mg  1,000 mg Oral DAILY    sodium chloride (NS) flush 5-40 mL  5-40 mL IntraVENous Q8H    sodium chloride (NS) flush 5-40 mL  5-40 mL IntraVENous PRN    acetaminophen (TYLENOL) tablet 650 mg  650 mg Oral Q6H PRN    Or    acetaminophen (TYLENOL) suppository 650 mg  650 mg Rectal Q6H PRN    polyethylene glycol (MIRALAX) packet 17 g  17 g Oral DAILY    promethazine (PHENERGAN) tablet 12.5 mg  12.5 mg Oral Q6H PRN    Or    ondansetron (ZOFRAN) injection 4 mg  4 mg IntraVENous Q6H PRN    famotidine (PEPCID) tablet 20 mg  20 mg Oral BID    enoxaparin (LOVENOX) injection 40 mg  40 mg SubCUTAneous DAILY    albuterol (PROVENTIL VENTOLIN) nebulizer solution 2.5 mg  2.5 mg Nebulization Q4H PRN    0.9% sodium chloride infusion 250 mL  250 mL IntraVENous PRN    guaiFENesin ER (MUCINEX) tablet 1,200 mg  1,200 mg Oral BID    dexAMETHasone (DECADRON) tablet 6 mg  6 mg Oral DAILY    guaiFENesin-dextromethorphan (ROBITUSSIN DM) 100-10 mg/5 mL syrup 10 mL  10 mL Oral Q4H PRN    zinc sulfate tablet 220 mg  220 mg Oral DAILY    HYDROcodone-homatropine (HYCODAN) 5-1.5 mg/5 mL (5 mL) oral solution 5 mL  5 mL Oral Q4H PRN    influenza vaccine 2020-21 (6 mos+)(PF) (FLUARIX/FLULAVAL/FLUZONE QUAD) injection 0.5 mL  0.5 mL IntraMUSCular PRIOR TO DISCHARGE    remdesivir 100 mg in 0.9% sodium chloride 250 mL IVPB  100 mg IntraVENous Q24H    0.9% sodium chloride infusion 30 mL  30 mL IntraVENous Q24H         Other Studies:  Xr Chest Port    Result Date: 9/12/2020  EXAM: Chest x-ray. INDICATION: Dyspnea. Covid 19. COMPARISON: Yesterday's chest x-ray. TECHNIQUE: Frontal view chest x-ray. FINDINGS: There are progressed patchy diffuse bilateral lung infiltrates. Cardiac size and mediastinal contour are within normal limits. No pneumothorax or pleural effusion is seen. IMPRESSION: Progressed patchy bilateral lung infiltrates, suspect for acute pneumonia such as Covid 19. Xr Chest Port    Result Date: 9/11/2020  CHEST ONE VIEW HISTORY: Shortness of breath. Worsening  COVID-19 symptoms. COMPARISON: September 7, 2020 FINDINGS: The lung volumes are diminished. Interstitial densities are present in the periphery of both middle and lower lung zones. Pleural spaces are clear. IMPRESSION: Low lung volumes with middle and lower lung zone peripheral interstitial opacities. This pattern is consistent with a viral pneumonia. Xr Chest Port    Result Date: 9/7/2020  Chest X-ray INDICATION: Shortness of breath, possible COVID-19 A portable AP view of the chest was obtained. FINDINGS: There is a questionable small area of infiltrate in the lateral left lung base. Right lung is clear. The heart size is normal.  The bony thorax is intact. IMPRESSION: Possible left lower lobe infiltrate     Assessment:    Active Hospital Problems    Diagnosis Date Noted    Acute respiratory failure with hypoxia ([address] 09/13/2020    COVID-19 virus infection 09/12/2020       Plan:    Acute Respiratory failure with hypoxia  COVID 19 infection  S/p conv plasma on 9/13.   - continue with remdesivir (EOT 9/17)  - continue with decadron (EOT 9/22)  - O2 supplement and wean as tolerated.  Remains on 4-5HFNC O2.  - continue with zinc and vitamin C  - continue with mucinex, incentive spirometry  - CXR in the AM    Asymptomatic bradycardia  EKG with sinus kong without any heart blocks. HR as low as 42 overnight while asleep. - Cardio consulted  - atropine PRN if heart rate drops below 30 and is symtomatic    Hypoalbuminemia  - albumin of 2.7  - Nutrition consult    Diet:  DIET REGULAR  DIET NUTRITIONAL SUPPLEMENTS  DVT PPx: Lovenox  Code: Full Code  Dispo: pending clinical course and PT/OT Eval  Estimated Discharge: TBD based on clinical course    Labs/Imaging Reviewed. Patient is HIGH risk due to current condition and comorbid conditions as well as requiring frequent monitoring and high risk of decline. Plan discussed with staff, patient/family and are in agreement.       Signed By: Preston Snow MD     September 16, 2020

## 2020-09-16 NOTE — PROGRESS NOTES
Patient has rested well overnight and was observed during hourly rounding. He was placed onto telemetry monitoring per physician order and had heart rates in the range of 36-42 bpm.  He will continue to be monitored and assisted as needed until day shift assumes care of this patient.

## 2020-09-16 NOTE — CONSULTS
Comprehensive Nutrition Assessment    Type and Reason for Visit: Initial, Consult(General nutrition and supplements (Hospitalist))     This assessment was completed remotely. Nutrition Recommendations/Plan:   Continue current diet. Start Ensure Enlive TID    Nutrition Assessment:  Patient presented to ER with worsening shortness of breath, headache, fever, fatigue. Entire family is COVID positive. PMH significant for GERD, HLD. Called and spoke with RN as patient is on isolation and Maltese speaking. She states that patient is barely eating anything. She states that he will ask to keep his trays, but never actually ends up eating anything. When discussing recorded meals in documentation, she reports they are likely inaccurate. Called  and left  for call back. Still awaiting call back. Estimated Daily Nutrient Needs:  Energy (kcal):  4340-8424(40-86 kcal/kg (86.2 kg))  Protein (g):  78-99(20% kcal)         Nutrition Related Findings:  deferred due to COVID isolation      Current Nutrition Therapies:   DIET REGULAR    Anthropometric Measures:  · Height:  5' 6\" (167.6 cm)  · Current Body Wt:  86.2 kg (190 lb 0.6 oz)(patient stated)   · Body mass index is 30.67 kg/m². · BMI Category:  Obese class 1 (BMI 30.0-34. 9)       Nutrition Diagnosis:   · Inadequate oral intake related to (suspected poor appetite) as evidenced by (RN reported intake)      Nutrition Interventions:   Food and/or Nutrient Delivery: Continue current diet, Start oral nutrition supplement  Coordination of Nutrition Care: (Discussed with Yoandy Franks RN)    Goals:  Meet at least 75% nutrition needs within 7 days       Nutrition Monitoring and Evaluation:   Food/Nutrient Intake Outcomes: Food and nutrient intake    Discharge Planning: Too soon to determine     [address] [place], LD on 9/16/2020 at 3:25 PM  Contact: [phone]    Addendum 15:33:  Received call back from  Chel Maurice, but unable to contact patient by phone. Shashi Ch to let RD know if patient voices needs or has questions if/when is is able to reach him. Nutrition to continue to follow.

## 2020-09-17 ENCOUNTER — APPOINTMENT (OUTPATIENT)
Dept: GENERAL RADIOLOGY | Age: 50
DRG: 177 | End: 2020-09-17
Attending: INTERNAL MEDICINE
Payer: COMMERCIAL

## 2020-09-17 PROBLEM — R00.1 SINUS BRADYCARDIA: Status: ACTIVE | Noted: 2020-09-17

## 2020-09-17 LAB
ALBUMIN SERPL-MCNC: 2.7 G/DL (ref 3.5–5)
ALBUMIN/GLOB SERPL: 0.6 {RATIO} (ref 1.2–3.5)
ALP SERPL-CCNC: 87 U/L (ref 50–136)
ALT SERPL-CCNC: 60 U/L (ref 12–65)
ANION GAP SERPL CALC-SCNC: 6 MMOL/L (ref 7–16)
AST SERPL-CCNC: 29 U/L (ref 15–37)
BILIRUB SERPL-MCNC: 0.3 MG/DL (ref 0.2–1.1)
BUN SERPL-MCNC: 19 MG/DL (ref 6–23)
CALCIUM SERPL-MCNC: 8.3 MG/DL (ref 8.3–10.4)
CHLORIDE SERPL-SCNC: 109 MMOL/L (ref 98–107)
CO2 SERPL-SCNC: 24 MMOL/L (ref 21–32)
CREAT SERPL-MCNC: 0.78 MG/DL (ref 0.8–1.5)
GLOBULIN SER CALC-MCNC: 4.2 G/DL (ref 2.3–3.5)
GLUCOSE SERPL-MCNC: 114 MG/DL (ref 65–100)
MAGNESIUM SERPL-MCNC: 2.3 MG/DL (ref 1.8–2.4)
POTASSIUM SERPL-SCNC: 4 MMOL/L (ref 3.5–5.1)
PROT SERPL-MCNC: 6.9 G/DL (ref 6.3–8.2)
SODIUM SERPL-SCNC: 139 MMOL/L (ref 136–145)
TSH SERPL DL<=0.005 MIU/L-ACNC: 0.97 UIU/ML (ref 0.36–3.74)

## 2020-09-17 PROCEDURE — 94762 N-INVAS EAR/PLS OXIMTRY CONT: CPT

## 2020-09-17 PROCEDURE — 94640 AIRWAY INHALATION TREATMENT: CPT

## 2020-09-17 PROCEDURE — 71045 X-RAY EXAM CHEST 1 VIEW: CPT

## 2020-09-17 PROCEDURE — 74011250636 HC RX REV CODE- 250/636: Performed by: INTERNAL MEDICINE

## 2020-09-17 PROCEDURE — 74011000250 HC RX REV CODE- 250: Performed by: INTERNAL MEDICINE

## 2020-09-17 PROCEDURE — 74011250637 HC RX REV CODE- 250/637: Performed by: FAMILY MEDICINE

## 2020-09-17 PROCEDURE — 83735 ASSAY OF MAGNESIUM: CPT

## 2020-09-17 PROCEDURE — 84443 ASSAY THYROID STIM HORMONE: CPT

## 2020-09-17 PROCEDURE — 97530 THERAPEUTIC ACTIVITIES: CPT

## 2020-09-17 PROCEDURE — 74011250637 HC RX REV CODE- 250/637: Performed by: INTERNAL MEDICINE

## 2020-09-17 PROCEDURE — 80053 COMPREHEN METABOLIC PANEL: CPT

## 2020-09-17 PROCEDURE — 65270000029 HC RM PRIVATE

## 2020-09-17 PROCEDURE — 97161 PT EVAL LOW COMPLEX 20 MIN: CPT

## 2020-09-17 PROCEDURE — 74011250636 HC RX REV CODE- 250/636: Performed by: FAMILY MEDICINE

## 2020-09-17 PROCEDURE — 74011000258 HC RX REV CODE- 258: Performed by: INTERNAL MEDICINE

## 2020-09-17 PROCEDURE — 99255 IP/OBS CONSLTJ NEW/EST HI 80: CPT | Performed by: INTERNAL MEDICINE

## 2020-09-17 RX ADMIN — Medication 10 ML: at 04:47

## 2020-09-17 RX ADMIN — ENOXAPARIN SODIUM 40 MG: 40 INJECTION SUBCUTANEOUS at 08:40

## 2020-09-17 RX ADMIN — SODIUM CHLORIDE 30 ML: 900 INJECTION, SOLUTION INTRAVENOUS at 17:03

## 2020-09-17 RX ADMIN — FAMOTIDINE 20 MG: 20 TABLET, FILM COATED ORAL at 17:03

## 2020-09-17 RX ADMIN — GUAIFENESIN 1200 MG: 600 TABLET ORAL at 08:40

## 2020-09-17 RX ADMIN — Medication 220 MG: at 08:40

## 2020-09-17 RX ADMIN — Medication 10 ML: at 12:13

## 2020-09-17 RX ADMIN — ALBUTEROL SULFATE 1 PUFF: 108 INHALANT RESPIRATORY (INHALATION) at 08:00

## 2020-09-17 RX ADMIN — DEXAMETHASONE 6 MG: 4 TABLET ORAL at 08:41

## 2020-09-17 RX ADMIN — ALBUTEROL SULFATE 1 PUFF: 108 INHALANT RESPIRATORY (INHALATION) at 20:49

## 2020-09-17 RX ADMIN — Medication 1000 MG: at 08:40

## 2020-09-17 RX ADMIN — GUAIFENESIN 1200 MG: 600 TABLET ORAL at 17:03

## 2020-09-17 RX ADMIN — Medication 10 ML: at 19:57

## 2020-09-17 RX ADMIN — POLYETHYLENE GLYCOL 3350 17 G: 17 POWDER, FOR SOLUTION ORAL at 08:41

## 2020-09-17 RX ADMIN — REMDESIVIR 100 MG: 100 INJECTION, POWDER, LYOPHILIZED, FOR SOLUTION INTRAVENOUS at 17:02

## 2020-09-17 RX ADMIN — FAMOTIDINE 20 MG: 20 TABLET, FILM COATED ORAL at 08:40

## 2020-09-17 NOTE — PROGRESS NOTES
Hospital  is available 24/7 for over-the-phone language services.  Please call Shraddha at [phone]  between 8:00 am-4:30 pm for any request       Thank you,           Shraddha [address]  [phone] (phone)

## 2020-09-17 NOTE — PROGRESS NOTES
Problem: Mobility Impaired (Adult and Pediatric)  Goal: *Acute Goals and Plan of Care (Insert Text)  9/17/2020 1024 by Reese Mojica DPT  Outcome: Progressing Towards Goal  Note:   LTG:  (1.)Mr. Rola Bautista will move from supine to sit and sit to supine, scoot up and down, and roll side to side INDEPENDENTLY with bed flat within 7 treatment day(s). (2.)Mr. Rola Bautista will transfer from bed to chair and chair to bed INDEPENDENTLY within 7 treatment day(s). (3.)Mr. Rola Bautista will ambulate INDEPENDENTLY for 250+ feet within 7 treatment day(s). ________________________________________________________________________________________________       PHYSICAL THERAPY: Initial Assessment and AM 9/17/2020  INPATIENT: PT Visit Days : 1  Payor: Hilario Benítez / Plan: [place] [employer] [place] / Product Type: Hayley Borden /       NAME/AGE/GENDER: Heide Mario is a 53 y.o. male   PRIMARY DIAGNOSIS: COVID-19 virus infection [U07.1] Acute respiratory failure with hypoxia ([address] Acute respiratory failure with hypoxia ([address]        ICD-10: Treatment Diagnosis:    Generalized Muscle Weakness (M62.81)  Other abnormalities of gait and mobility (R26.89)   Precaution/Allergies:  Patient has no known allergies. ASSESSMENT:     Mr. Rola Bautista is a 53year old male admitted from home for respiratory failure and hypoxia r/t COVID 19 infection. He lives with family and is fully independent at baseline without DME use. Drives and works as . Presents in supine without complaints, agreeable to therapy assessment and mobility. Transfers to sitting independently and demonstrates intact seated balance. Supervision for sit-stand transfers. Pt ambulates in room initially with close CGA/SBA for safety progressing to supervision. No DME used, demonstrates slow steady gait pace. Took short seated rest break after a few laps, SpO2 95% after activity on room air.  Pt performs standing static/dynamic balance and functional activities to work on strength, balance, and activity tolerance. Takes one more short break then ambulates for an additional few laps in room with same gait mechanics. Up to recliner after session, positioned comfortably with needs in reach, RN notified. SpO2 maintains 95-97% on room air. Ethan Becca is functioning slightly below baseline with strength, balance, and activity tolerance due to hospital stay, decreased activity, and virus. He will benefit from 1-2 additional therapy sessions to maximize safety/independence with mobility. Anticipate return home at [place] with no needs identified. Good family support. This section established at most recent assessment   PROBLEM LIST (Impairments causing functional limitations):  Decreased Strength  Decreased Ambulation Ability/Technique  Decreased Balance  Decreased Activity Tolerance   INTERVENTIONS PLANNED: (Benefits and precautions of physical therapy have been discussed with the patient.)  Balance Exercise  Bed Mobility  Gait Training  Home Exercise Program (HEP)  Therapeutic Activites  Therapeutic Exercise/Strengthening  Transfer Training     TREATMENT PLAN: Frequency/Duration: 2 times a week for 1 week  Rehabilitation Potential For Stated Goals: Excellent     REHAB RECOMMENDATIONS (at time of discharge pending progress):    Placement: It is my opinion, based on this patient's performance to date, that Mr. Valerie Dozier may benefit from being discharged with NO further skilled therapy due to the high likelihood of returning to baseline. Equipment:   None at this time              HISTORY:   History of Present Injury/Illness (Reason for Referral):  Per H&P, \"Patient is a 53 y.o. male who presents to the ER due to being COVID+ and worsening SOB. His entire family is COVID +. Reports continued fevers, headaches, fatigue, and significant FERNANDEZ. Denies chest pain, abdominal pain, n/v/d.   Sats dropped to high 80's with just moving from wheelchair to stretcher\"    Past Medical History/Comorbidities:   Mr. Fiordaliza Sims  has a past medical history of GERD (gastroesophageal reflux disease), Hypercholesteremia, and Right shoulder pain. Mr. Fiordaliza Sims  has a past surgical history that includes hx knee arthroscopy (Right) and hx other surgical.  Social History/Living Environment:   Home Environment: Private residence  # Steps to Enter: 4  Rails to Enter: Yes  One/Two Story Residence: Two story, live on 1st floor  # of Interior Steps: 14  Height of Each Step (in): 3 inches  Interior Rails: Both  Lift Chair Available: No  Living Alone: No  Support Systems: Spouse/Significant Other/Partner, Child(jasen), Family member(s)  Patient Expects to be Discharged to[de-identified] Private residence  Current DME Used/Available at Home: None  Prior Level of Function/Work/Activity:  Lives with wife and three children. Two story home with 4-5 steps to enter. Pt typically fully independent without DME use- drives and works as . No falls or home O2. Number of Personal Factors/Comorbidities that affect the Plan of Care: 0: LOW COMPLEXITY   EXAMINATION:   Most Recent Physical Functioning:   Gross Assessment:  AROM: Within functional limits  Strength: Generally decreased, functional  Coordination: Within functional limits               Posture:  Posture (WDL): Within defined limits  Balance:  Sitting: Intact  Standing: Impaired  Standing - Static: Good  Standing - Dynamic : Fair;Good(fair+ to good) Bed Mobility:  Rolling: Independent  Supine to Sit: Independent  Scooting: Independent  Wheelchair Mobility:     Transfers:  Sit to Stand: Supervision  Stand to Sit: Supervision  Bed to Chair: Stand-by assistance;Supervision  Interventions: Verbal cues; Safety awareness training; Tactile cues  Duration: 10 Minutes  Gait:     Speed/Jaelyn: Pace decreased (<100 feet/min)  Gait Abnormalities: Trunk sway increased  Distance (ft): 120 Feet (ft)  Assistive Device: (none)  Ambulation - Level of Assistance: Stand-by assistance;Supervision  Interventions: Verbal cues; Safety awareness training      Body Structures Involved:  Lungs Body Functions Affected: Movement Related Activities and Participation Affected: Mobility  Community, Social and [place]   Number of elements that affect the Plan of Care: 4+: HIGH COMPLEXITY   CLINICAL PRESENTATION:   Presentation: Stable and uncomplicated: LOW COMPLEXITY   CLINICAL DECISION MAKIN [facility] Mobility Inpatient Short Form  How much difficulty does the patient currently have. .. Unable A Lot A Little None   1. Turning over in bed (including adjusting bedclothes, sheets and blankets)? [] 1   [] 2   [] 3   [x] 4   2. Sitting down on and standing up from a chair with arms ( e.g., wheelchair, bedside commode, etc.)   [] 1   [] 2   [] 3   [x] 4   3. Moving from lying on back to sitting on the side of the bed? [] 1   [] 2   [] 3   [x] 4   How much help from another person does the patient currently need. .. Total A Lot A Little None   4. Moving to and from a bed to a chair (including a wheelchair)? [] 1   [] 2   [] 3   [x] 4   5. Need to walk in hospital room? [] 1   [] 2   [x] 3   [] 4   6. Climbing 3-5 steps with a railing? [] 1   [] 2   [x] 3   [] 4   © , Trustees of [address] [place] [address], under license to [facility]. All rights reserved      Score:  Initial: 22 Most Recent: X (Date: -- )    Interpretation of Tool:  Represents activities that are increasingly more difficult (i.e. Bed mobility, Transfers, Gait). Medical Necessity:     Patient demonstrates   good   rehab potential due to higher previous functional level. Reason for Services/Other Comments:  Patient   continues to demonstrate capacity to improve strength, balance, and activity tolerance which will   increase independence and increase safety  .    Use of outcome tool(s) and clinical judgement create a POC that gives a: Clear prediction of patient's progress: LOW COMPLEXITY            TREATMENT:   (In addition to Assessment/Re-Assessment sessions the following treatments were rendered)   Pre-treatment Symptoms/Complaints:  \"I feel much better\"  Pain: Initial:   Pain Intensity 1: 0  Post Session:  0/10     Therapeutic Activity: (  10 Minutes ):  Therapeutic activities including Bed transfers, Chair transfers, Ambulation on level ground, and standing static/dynamic activities all while monitoring oxygen saturations and working on activity pacing/pursed lip breathing to improve mobility, strength, balance, and activity tolerance . Required minimal Verbal cues; Safety awareness training to promote static and dynamic balance in standing and promote motor control of bilateral, lower extremity(s). Braces/Orthotics/Lines/Etc:   O2 Device: Room air  Treatment/Session Assessment:    Response to Treatment:  pt performs mobility in room with SBA/supervision, SpO2 95-97% after activity on RA  Interdisciplinary Collaboration:   Physical Therapist  Registered Nurse  After treatment position/precautions:   Up in chair  Bed/Chair-wheels locked  Bed in low position  Call light within reach  RN notified   Compliance with Program/Exercises: Will assess as treatment progresses  Recommendations/Intent for next treatment session: \"Next visit will focus on advancements to more challenging activities and reduction in assistance provided\".   Total Treatment Duration:  PT Patient Time In/Time Out  Time In: 0956  Time Out: 31818 Keith Block DPT

## 2020-09-17 NOTE — ROUTINE PROCESS
Language services available over the phone from 8:00AM-4:30PM. Please call [phone] for any request. 
 
K[facility] Glover [facility] [address]  [facility] [employer] Language Services Department 
[address] [place] [address] 
[phone] (phone)

## 2020-09-17 NOTE — CONSULTS
[address] Cardiology Consult                Date of  Admission: 9/12/2020  9:35 PM       Primary Cardiologist: None  Referring Physician: Dr Carrol Li       CC/Reason for consult: bradycardia      Telly Pinzon is a 53 y.o. male admitted for COVID-19 virus infection [U07.1]. He has no h/o cardiac disease and was admitted 9-15 w known COVID infection with worsening FERNANDEZ and O2 sats dropping into the 80's w exertion. He required increased supplemental O2 to 9L. He was given plasma and remdesivir and his supplemental O2 requirements have decreased to 4L. He has been on remote tele and noted to be sinus kong for the past 48 hours w HR as low as 38 but asymptomatic and no high degree block noted. EKG showed SB w rate 51 w NSST/T wave changes. On no rate slowing meds, /72. K 4, mag 2.3. Patient Active Problem List   Diagnosis Code    COVID-19 virus infection U07.1    Acute respiratory failure with hypoxia ([facility] [address] J96.01       Past Medical History:   Diagnosis Date    GERD (gastroesophageal reflux disease)     managed well with PRN OTC meds    Hypercholesteremia     no current meds at this time- attempted to manage with diet.     Right shoulder pain       Past Surgical History:   Procedure Laterality Date    HX KNEE ARTHROSCOPY Right     HX OTHER SURGICAL      eye surg as a child     No Known Allergies   Family History   Problem Relation Age of Onset    Anemia Mother     Hypertension Father     Parkinson's Disease Father       Social History     Tobacco Use    Smoking status: Never Smoker    Smokeless tobacco: Never Used   Substance Use Topics    Alcohol use: Yes     Comment: occ        Current Facility-Administered Medications   Medication Dose Route Frequency    albuterol (PROVENTIL HFA, VENTOLIN HFA, PROAIR HFA) inhaler 1 Puff  1 Puff Inhalation BID RT    ascorbic acid (vitamin C) (VITAMIN C) tablet 1,000 mg  1,000 mg Oral DAILY    sodium chloride (NS) flush 5-40 mL  5-40 mL IntraVENous Q8H    sodium chloride (NS) flush 5-40 mL  5-40 mL IntraVENous PRN    acetaminophen (TYLENOL) tablet 650 mg  650 mg Oral Q6H PRN    Or    acetaminophen (TYLENOL) suppository 650 mg  650 mg Rectal Q6H PRN    polyethylene glycol (MIRALAX) packet 17 g  17 g Oral DAILY    promethazine (PHENERGAN) tablet 12.5 mg  12.5 mg Oral Q6H PRN    Or    ondansetron (ZOFRAN) injection 4 mg  4 mg IntraVENous Q6H PRN    famotidine (PEPCID) tablet 20 mg  20 mg Oral BID    enoxaparin (LOVENOX) injection 40 mg  40 mg SubCUTAneous DAILY    albuterol (PROVENTIL VENTOLIN) nebulizer solution 2.5 mg  2.5 mg Nebulization Q4H PRN    0.9% sodium chloride infusion 250 mL  250 mL IntraVENous PRN    guaiFENesin ER (MUCINEX) tablet 1,200 mg  1,200 mg Oral BID    dexAMETHasone (DECADRON) tablet 6 mg  6 mg Oral DAILY    guaiFENesin-dextromethorphan (ROBITUSSIN DM) 100-10 mg/5 mL syrup 10 mL  10 mL Oral Q4H PRN    zinc sulfate tablet 220 mg  220 mg Oral DAILY    HYDROcodone-homatropine (HYCODAN) 5-1.5 mg/5 mL (5 mL) oral solution 5 mL  5 mL Oral Q4H PRN    influenza vaccine 2020-21 (6 mos+)(PF) (FLUARIX/FLULAVAL/FLUZONE QUAD) injection 0.5 mL  0.5 mL IntraMUSCular PRIOR TO DISCHARGE    remdesivir 100 mg in 0.9% sodium chloride 250 mL IVPB  100 mg IntraVENous Q24H    0.9% sodium chloride infusion 30 mL  30 mL IntraVENous Q24H       Review of Symptoms:  General: + weight loss,  + weakness, fever or chills  Skin: no rashes, lumps, or other skin changes  HEENT: + HA  Neck: no swollen glands, goiter, pain or stiffness  Respiratory: + cough, sputum, hemoptysis, + dyspnea, wheezing  Cardiovascular: + as per HPI  Gastrointestinal: + decreased po intake  Urinary: no frequency, urgency , hematuria, burning/pain with urination, recent flank pain, polyuria, nocturia, or difficulty urinating  Peripheral Vascular: no claudication, leg cramps, prior DVTs, swelling of calves, legs, or feet, color change, or swelling with redness or tenderness  Musculoskeletal: no muscle or joint pain/stiffness, joint swelling, erythema of joints, or back pain  Psychiatric: no depression or excessive stress  Neurological: no sensory or motor loss, seizures, syncope, tremors, numbness, no dementia  Hematologic: no anemia, easy bruising or bleeding  Endocrine: no thyroid problems, heat or cold intolerance, excessive sweating, polyuria, polydipsia, no  diabetes. Physical Exam  Vitals:    09/17/20 0625 09/17/20 0828 09/17/20 0829 09/17/20 0836   BP:    108/72   Pulse:    (!) 43   Resp:    20   Temp:    98.3 °F (36.8 °C)   SpO2:  95% 95% 97%   Weight: 81.8 kg (180 lb 4.8 oz)      Height:           Physical Exam:  General: Well Developed, Well Nourished, No Acute Distress  HEENT: pupils equal and round, no abnormalities noted  Neck: supple, no JVD, no carotid bruits  Heart: S1S2 with RRR without murmurs or gallops  Lungs: Clear apically bilaterally without adventitious sounds, coarse/mildly decreased bibasilar but no crackles or wheezing at present. Abd: soft, nontender, nondistended, with good bowel sounds  Ext: warm, no edema, calves supple/nontender, pulses 2+ bilaterally  Skin: warm and dry  Psychiatric: Normal mood and affect  Neurologic: Alert and oriented X 3    Cardiographics:    Telemetry: SB rate 35-45      Labs:   Recent Labs     09/17/20  0310 09/16/20  0328    140   K 4.0 4.0   MG 2.3  --    BUN 19 20   CREA 0.78* 0.78*   * 120*        Assessment/Plan:     Assessment:      Principal Problem:    Acute respiratory failure with hypoxia (HCC) (9/13/2020)- improving with convalescent plasma and remdesivir- continue to monitor closely. Per primary MD's. Comfortable sitting in chair today. Active Problems:    COVID-19 virus infection (9/12/2020)- slowly improving per primary MD's      Sinus bradycardia (9/17/2020)- persistent but clinically completely asymptomatic. No need for pacer implant at present.   Continue to monitor closely on tele while inpatient. Stay well hydrated. Call with any new postural symptoms. Follow tele for pauses/heart block. No rate slowing meds. We will follow with you. GERD- stable, continue meds    Dyslipidemia- diet controlling per his wishes.      Juan Jose Billy MD  [phone]

## 2020-09-17 NOTE — PROGRESS NOTES
Pt resting in bed. Pt on RA with sat 97% on continuous sat monitor at this time. No distress noted at this time. Call light in reach, will monitor.

## 2020-09-17 NOTE — PROGRESS NOTES
Name: Sabrina Johnson MRN: [id number]  : 1970  Age:50 y.o.  male  Admit Date:  2020 LOS: 5      Hospitalist Progress Note     Reason for Admission:  COVID-19 virus infection [U07.1]    Hospital Course:  Sabrina Johnson is a 53 y.o. male with no significant medical history who is admitted due to worsening SOB with COVID+. He received conv plasma on , a course of remdesivir (EOT ) and is on decadron (EOT ). Initially requiring HFNC but now weaned to room air. PT evaluated the patient and recommending no additional therapy. Will need O2 qualifier with anticipated DC tomorrow. Subjective/24 hr Events (20) : Patient is seen and examined at bedside. Patient is weaned to room air this am and saturating > 93%. Denies fever, chills, chest pains, n/v, abdominal pain, shortness of breath. Feels a lot better today. ROS: 10 point review of systems is otherwise negative with the exception of the elements mentioned above.     Objective:    Patient Vitals for the past 24 hrs:   Temp Pulse Resp BP SpO2   20 1136 98.2 °F (36.8 °C) 78 20 97/63 92 %   20 0956     96 %   20 0836 98.3 °F (36.8 °C) (!) 43 20 108/72 97 %   20 0829     95 %   20 0828     95 %   20 0425  (!) 39 18 113/72 97 %   20 0400  (!) 34      20 0341 97.9 °F (36.6 °C)       20 0325  (!) 55      20 0009  (!) 34      20 2353 98.1 °F (36.7 °C) (!) 103 18 107/61 97 %   20 2200  (!) 55      20 2147     95 %   20 2004  (!) 40      20 1947 98.1 °F (36.7 °C) (!) 41 18 (!) 95/47 95 %   20 1529 98.1 °F (36.7 °C) 75 20 (!) 105/58 95 %   20 1330     96 %     Oxygen Therapy  O2 Sat (%): 92 % (20 1136)  Pulse via Oximetry: 50 beats per minute (20)  O2 Device: Room air (20 0956)  O2 Flow Rate (L/min): 4 l/min (20)  O2 Temperature: 41 °F (5 °C) (09/15/20 1912)  FIO2 (%): 40 % (09/17/20 0829)    Estimated body mass index is 29.1 kg/m² as calculated from the following:    Height as of this encounter: 5' 6\" (1.676 m). Weight as of this encounter: 81.8 kg (180 lb 4.8 oz). Intake/Output Summary (Last 24 hours) at 9/17/2020 1242  Last data filed at 9/17/2020 1011  Gross per 24 hour   Intake 640 ml   Output 2050 ml   Net -1410 ml       *Note that automatically entered I/Os may not be accurate; dependent on patient compliance with collection and accurate  by techs. Physical Exam:   General:     alert, awake, no acute distress. Head:   normocephalic, atraumatic  Eyes, Ears, nose: PERRL, EOMI. Normal conjunctiva  Neck:    supple, non-tender. Trachea midline. Lungs:   CTAB, no wheezing, rhonchi, rales  Cardiac:   RRR, Normal S1 and S2. Abdomen:   Soft, non distended, nontender, +BS  Extremities:   Warm, dry. No edema   Skin:   No rashes, no jaundice  Neuro:  AAOx3. No gross focal neurological deficit  Psychiatric:  No anxiety, calm, cooperative    Data Review:  I have reviewed all labs, meds, and studies from the last 24 hours:      Labs:    Recent Results (from the past 24 hour(s))   METABOLIC PANEL, COMPREHENSIVE    Collection Time: 09/17/20  3:10 AM   Result Value Ref Range    Sodium 139 136 - 145 mmol/L    Potassium 4.0 3.5 - 5.1 mmol/L    Chloride 109 (H) 98 - 107 mmol/L    CO2 24 21 - 32 mmol/L    Anion gap 6 (L) 7 - 16 mmol/L    Glucose 114 (H) 65 - 100 mg/dL    BUN 19 6 - 23 MG/DL    Creatinine 0.78 (L) 0.8 - 1.5 MG/DL    GFR est AA >60 >60 ml/min/1.73m2    GFR est non-AA >60 >60 ml/min/1.73m2    Calcium 8.3 8.3 - 10.4 MG/DL    Bilirubin, total 0.3 0.2 - 1.1 MG/DL    ALT (SGPT) 60 12 - 65 U/L    AST (SGOT) 29 15 - 37 U/L    Alk.  phosphatase 87 50 - 136 U/L    Protein, total 6.9 6.3 - 8.2 g/dL    Albumin 2.7 (L) 3.5 - 5.0 g/dL    Globulin 4.2 (H) 2.3 - 3.5 g/dL    A-G Ratio 0.6 (L) 1.2 - 3.5     TSH 3RD GENERATION Collection Time: 09/17/20  3:10 AM   Result Value Ref Range    TSH 0.975 0.358 - 3.740 uIU/mL   MAGNESIUM    Collection Time: 09/17/20  3:10 AM   Result Value Ref Range    Magnesium 2.3 1.8 - 2.4 mg/dL         All Micro Results     None          Current Meds:  Current Facility-Administered Medications   Medication Dose Route Frequency    albuterol (PROVENTIL HFA, VENTOLIN HFA, PROAIR HFA) inhaler 1 Puff  1 Puff Inhalation BID RT    ascorbic acid (vitamin C) (VITAMIN C) tablet 1,000 mg  1,000 mg Oral DAILY    sodium chloride (NS) flush 5-40 mL  5-40 mL IntraVENous Q8H    sodium chloride (NS) flush 5-40 mL  5-40 mL IntraVENous PRN    acetaminophen (TYLENOL) tablet 650 mg  650 mg Oral Q6H PRN    Or    acetaminophen (TYLENOL) suppository 650 mg  650 mg Rectal Q6H PRN    polyethylene glycol (MIRALAX) packet 17 g  17 g Oral DAILY    promethazine (PHENERGAN) tablet 12.5 mg  12.5 mg Oral Q6H PRN    Or    ondansetron (ZOFRAN) injection 4 mg  4 mg IntraVENous Q6H PRN    famotidine (PEPCID) tablet 20 mg  20 mg Oral BID    enoxaparin (LOVENOX) injection 40 mg  40 mg SubCUTAneous DAILY    albuterol (PROVENTIL VENTOLIN) nebulizer solution 2.5 mg  2.5 mg Nebulization Q4H PRN    0.9% sodium chloride infusion 250 mL  250 mL IntraVENous PRN    guaiFENesin ER (MUCINEX) tablet 1,200 mg  1,200 mg Oral BID    dexAMETHasone (DECADRON) tablet 6 mg  6 mg Oral DAILY    guaiFENesin-dextromethorphan (ROBITUSSIN DM) 100-10 mg/5 mL syrup 10 mL  10 mL Oral Q4H PRN    zinc sulfate tablet 220 mg  220 mg Oral DAILY    HYDROcodone-homatropine (HYCODAN) 5-1.5 mg/5 mL (5 mL) oral solution 5 mL  5 mL Oral Q4H PRN    influenza vaccine 2020-21 (6 mos+)(PF) (FLUARIX/FLULAVAL/FLUZONE QUAD) injection 0.5 mL  0.5 mL IntraMUSCular PRIOR TO DISCHARGE    remdesivir 100 mg in 0.9% sodium chloride 250 mL IVPB  100 mg IntraVENous Q24H    0.9% sodium chloride infusion 30 mL  30 mL IntraVENous Q24H         Other Studies:  Xr Chest [employer] Date: 9/12/2020  EXAM: Chest x-ray. INDICATION: Dyspnea. Covid 19. COMPARISON: Yesterday's chest x-ray. TECHNIQUE: Frontal view chest x-ray. FINDINGS: There are progressed patchy diffuse bilateral lung infiltrates. Cardiac size and mediastinal contour are within normal limits. No pneumothorax or pleural effusion is seen. IMPRESSION: Progressed patchy bilateral lung infiltrates, suspect for acute pneumonia such as Covid 19. Xr Chest Port    Result Date: 9/11/2020  CHEST ONE VIEW HISTORY: Shortness of breath. Worsening  COVID-19 symptoms. COMPARISON: September 7, 2020 FINDINGS: The lung volumes are diminished. Interstitial densities are present in the periphery of both middle and lower lung zones. Pleural spaces are clear. IMPRESSION: Low lung volumes with middle and lower lung zone peripheral interstitial opacities. This pattern is consistent with a viral pneumonia. Xr Chest Port    Result Date: 9/7/2020  Chest X-ray INDICATION: Shortness of breath, possible COVID-19 A portable AP view of the chest was obtained. FINDINGS: There is a questionable small area of infiltrate in the lateral left lung base. Right lung is clear. The heart size is normal.  The bony thorax is intact. IMPRESSION: Possible left lower lobe infiltrate     Assessment:    Active Hospital Problems    Diagnosis Date Noted    Sinus bradycardia 09/17/2020    Acute respiratory failure with hypoxia ([address] 09/13/2020    COVID-19 virus infection 09/12/2020       Plan:    Acute Respiratory failure with hypoxia  COVID 19 infection  S/p conv plasma on 9/13, remdesivir (EOT 9/17)  - continue with decadron (EOT 9/22)  - O2 supplement and wean as tolerated. Now on room air  - continue with zinc and vitamin C  - continue with mucinex, incentive spirometry  - O2 qualifier    Asymptomatic bradycardia  EKG with sinus kong without any heart blocks. HR as low as 42 overnight while asleep.   - Cardio consulted  - atropine PRN if heart rate drops below 30 and is symtomatic    Hypoalbuminemia  - albumin of 2.7  - Nutrition consult    Diet:  DIET REGULAR  DIET NUTRITIONAL SUPPLEMENTS  DVT PPx: Lovenox  Code: Full Code  Dispo: Home  Estimated Discharge: 24hrs    Labs/Imaging Reviewed. Patient is moderate risk due to current condition and comorbid conditions as well as requiring frequent monitoring and high risk of decline. Plan discussed with staff, patient/family and are in agreement.       Signed By: Lucian Bro MD     September 17, 2020

## 2020-09-17 NOTE — PROGRESS NOTES
Pt sitting up in bed. Pt alert oriented times 3 at this time. Pt on RA with sat % at this time. Continuous sat monitor in place. Pt denies pain or distress at this time. Pt on remote telemetry with HR 38 at this time. Pt encouraged to call for assistance if needed call light in reach, will monitor.

## 2020-09-17 NOTE — PROGRESS NOTES
Patient's HR has sustained between 35-45. Dr. Jania Aly notified via [employer]. Patient asymptomatic at this point. Will continue to monitor for S&S, lightheadedness, dizziness, decreased CO. No further intervention at this point. MD may consider PRN atropine if patient becomes symptomatic.      Ramona Holden

## 2020-09-17 NOTE — PROGRESS NOTES
Pt resting in bed with eyes closed. Pt on RA . HR SB 50 at this time. Pt encouraged to call for assistance if needed call light in reach, will monitor.

## 2020-09-17 NOTE — PROGRESS NOTES
Problem: Falls - Risk of  Goal: *Absence of Falls  Description: Document Vahid Mohan Fall Risk and appropriate interventions in the flowsheet. Outcome: Progressing Towards Goal  Note: Fall Risk Interventions:            Medication Interventions: Teach patient to arise slowly, Patient to call before getting OOB         History of Falls Interventions: Room close to nurse's station         Problem: Activity Intolerance  Goal: *Able to remain out of bed as prescribed  Outcome: Progressing Towards Goal     Problem: Patient Education: Go to Patient Education Activity  Goal: Patient/Family Education  Outcome: Progressing Towards Goal     Problem: Airway Clearance - Ineffective  Goal: Achieve or maintain patent airway  Outcome: Progressing Towards Goal     Problem: Gas Exchange - Impaired  Goal: Absence of hypoxia  Outcome: Progressing Towards Goal  Goal: Promote optimal lung function  Outcome: Progressing Towards Goal     Problem: Breathing Pattern - Ineffective  Goal: Ability to achieve and maintain a regular respiratory rate  Outcome: Progressing Towards Goal     Problem:  Body Temperature -  Risk of, Imbalanced  Goal: Ability to maintain a body temperature within defined limits  Outcome: Progressing Towards Goal  Goal: Will regain or maintain usual level of consciousness  Outcome: Progressing Towards Goal  Goal: Complications related to the disease process, condition or treatment will be avoided or minimized  Outcome: Progressing Towards Goal     Problem: Isolation Precautions - Risk of Spread of Infection  Goal: Prevent transmission of infectious organism to others  Outcome: Progressing Towards Goal     Problem: Nutrition Deficits  Goal: Optimize nutrtional status  Outcome: Progressing Towards Goal     Problem: Risk for Fluid Volume Deficit  Goal: Maintain normal heart rhythm  Outcome: Progressing Towards Goal  Goal: Maintain absence of muscle cramping  Outcome: Progressing Towards Goal  Goal: Maintain normal serum potassium, sodium, calcium, phosphorus, and pH  Outcome: Progressing Towards Goal     Problem: Loneliness or Risk for Loneliness  Goal: Demonstrate positive use of time alone when socialization is not possible  Outcome: Progressing Towards Goal     Problem: Fatigue  Goal: Verbalize increase energy and improved vitality  Outcome: Progressing Towards Goal     Problem: Patient Education: Go to Patient Education Activity  Goal: Patient/Family Education  Outcome: Progressing Towards Goal       Flor Cowiche

## 2020-09-18 VITALS
DIASTOLIC BLOOD PRESSURE: 61 MMHG | HEIGHT: 66 IN | BODY MASS INDEX: 28.96 KG/M2 | WEIGHT: 180.2 LBS | RESPIRATION RATE: 19 BRPM | HEART RATE: 80 BPM | OXYGEN SATURATION: 98 % | SYSTOLIC BLOOD PRESSURE: 103 MMHG | TEMPERATURE: 98.1 F

## 2020-09-18 LAB
ALBUMIN SERPL-MCNC: 2.9 G/DL (ref 3.5–5)
ALBUMIN/GLOB SERPL: 0.7 {RATIO} (ref 1.2–3.5)
ALP SERPL-CCNC: 85 U/L (ref 50–136)
ALT SERPL-CCNC: 69 U/L (ref 12–65)
ANION GAP SERPL CALC-SCNC: 6 MMOL/L (ref 7–16)
AST SERPL-CCNC: 25 U/L (ref 15–37)
BILIRUB SERPL-MCNC: 0.5 MG/DL (ref 0.2–1.1)
BUN SERPL-MCNC: 17 MG/DL (ref 6–23)
CALCIUM SERPL-MCNC: 8.7 MG/DL (ref 8.3–10.4)
CHLORIDE SERPL-SCNC: 110 MMOL/L (ref 98–107)
CO2 SERPL-SCNC: 23 MMOL/L (ref 21–32)
CREAT SERPL-MCNC: 0.73 MG/DL (ref 0.8–1.5)
GLOBULIN SER CALC-MCNC: 4 G/DL (ref 2.3–3.5)
GLUCOSE SERPL-MCNC: 115 MG/DL (ref 65–100)
POTASSIUM SERPL-SCNC: 4.2 MMOL/L (ref 3.5–5.1)
PROT SERPL-MCNC: 6.9 G/DL (ref 6.3–8.2)
SODIUM SERPL-SCNC: 139 MMOL/L (ref 136–145)

## 2020-09-18 PROCEDURE — 74011250636 HC RX REV CODE- 250/636: Performed by: INTERNAL MEDICINE

## 2020-09-18 PROCEDURE — 99232 SBSQ HOSP IP/OBS MODERATE 35: CPT | Performed by: INTERNAL MEDICINE

## 2020-09-18 PROCEDURE — 74011250637 HC RX REV CODE- 250/637: Performed by: FAMILY MEDICINE

## 2020-09-18 PROCEDURE — 74011250637 HC RX REV CODE- 250/637: Performed by: INTERNAL MEDICINE

## 2020-09-18 PROCEDURE — 74011250636 HC RX REV CODE- 250/636: Performed by: FAMILY MEDICINE

## 2020-09-18 PROCEDURE — 80053 COMPREHEN METABOLIC PANEL: CPT

## 2020-09-18 PROCEDURE — 94640 AIRWAY INHALATION TREATMENT: CPT

## 2020-09-18 RX ORDER — DEXAMETHASONE 6 MG/1
6 TABLET ORAL
Qty: 4 TAB | Refills: 0 | Status: SHIPPED | OUTPATIENT
Start: 2020-09-18 | End: 2020-09-22

## 2020-09-18 RX ORDER — UREA 10 %
220 LOTION (ML) TOPICAL DAILY
Qty: 4 TAB | Refills: 0 | Status: SHIPPED | OUTPATIENT
Start: 2020-09-19 | End: 2020-09-23

## 2020-09-18 RX ORDER — FAMOTIDINE 20 MG/1
20 TABLET, FILM COATED ORAL 2 TIMES DAILY
Qty: 4 TAB | Refills: 0 | Status: SHIPPED | OUTPATIENT
Start: 2020-09-18

## 2020-09-18 RX ADMIN — FAMOTIDINE 20 MG: 20 TABLET, FILM COATED ORAL at 08:55

## 2020-09-18 RX ADMIN — Medication 1000 MG: at 08:55

## 2020-09-18 RX ADMIN — DEXAMETHASONE 6 MG: 4 TABLET ORAL at 08:55

## 2020-09-18 RX ADMIN — Medication 220 MG: at 08:55

## 2020-09-18 RX ADMIN — ENOXAPARIN SODIUM 40 MG: 40 INJECTION SUBCUTANEOUS at 08:55

## 2020-09-18 RX ADMIN — Medication 10 ML: at 05:43

## 2020-09-18 RX ADMIN — ALBUTEROL SULFATE 1 PUFF: 108 INHALANT RESPIRATORY (INHALATION) at 09:23

## 2020-09-18 RX ADMIN — GUAIFENESIN 1200 MG: 600 TABLET ORAL at 08:55

## 2020-09-18 NOTE — PROGRESS NOTES
Problem: Airway Clearance - Ineffective  Goal: Achieve or maintain patent airway  Outcome: Progressing Towards Goal     Problem: Gas Exchange - Impaired  Goal: Absence of hypoxia  Outcome: Progressing Towards Goal  Goal: Promote optimal lung function  Outcome: Progressing Towards Goal     Problem: Breathing Pattern - Ineffective  Goal: Ability to achieve and maintain a regular respiratory rate  Outcome: Progressing Towards Goal

## 2020-09-18 NOTE — DISCHARGE INSTRUCTIONS
DISCHARGE SUMMARY from Nurse    PATIENT INSTRUCTIONS:    After general anesthesia or intravenous sedation, for 24 hours or while taking prescription Narcotics:  · Limit your activities  · Do not drive and operate hazardous machinery  · Do not make important personal or business decisions  · Do  not drink alcoholic beverages  · If you have not urinated within 8 hours after discharge, please contact your surgeon on call. Report the following to your surgeon:  · Excessive pain, swelling, redness or odor of or around the surgical area  · Temperature over 100.5  · Nausea and vomiting lasting longer than 4 hours or if unable to take medications  · Any signs of decreased circulation or nerve impairment to extremity: change in color, persistent  numbness, tingling, coldness or increase pain  · Any questions    What to do at Home:  Recommended activity: Activity as tolerated    If you experience any of the following symptoms shortness of breath not relieved by rest, pain not relieved by medications, chest pain or pressure, increase in weakness fatigue or swelling, temperature greater than 101 please follow up with MD.    *  Please give a list of your current medications to your Primary Care Provider. *  Please update this list whenever your medications are discontinued, doses are      changed, or new medications (including over-the-counter products) are added. *  Please carry medication information at all times in case of emergency situations. These are general instructions for a healthy lifestyle:    No smoking/ No tobacco products/ Avoid exposure to second hand smoke  Surgeon General's Warning:  Quitting smoking now greatly reduces serious risk to your health.     Obesity, smoking, and sedentary lifestyle greatly increases your risk for illness    A healthy diet, regular physical exercise & weight monitoring are important for maintaining a healthy lifestyle    You may be retaining fluid if you have a history of heart failure or if you experience any of the following symptoms:  Weight gain of 3 pounds or more overnight or 5 pounds in a week, increased swelling in our hands or feet or shortness of breath while lying flat in bed. Please call your doctor as soon as you notice any of these symptoms; do not wait until your next office visit. The discharge information has been reviewed with the patient. The patient verbalized understanding. Discharge medications reviewed with the patient and appropriate educational materials and side effects teaching were provided.   ___________________________________________________________________________________________________________________________________

## 2020-09-18 NOTE — PROGRESS NOTES
DC instructions given to pt. Pt being DC home to self care with follow up appt. Pt medications, appt, and instructions given and understood. Prescriptions sent home with pt. Pt being DC home to self care. Pt awaiting ride from family and then will be DC home to self care. Papers not signed related to droplet plus precautions.

## 2020-09-18 NOTE — PROGRESS NOTES
Pt sitting up in bed. Pt alert oriented times 3 at this time. Pt on RA with sat % at this time. Continuous sat monitor in place. Pt denies pain or distress at this time. Pt on remote telemetry with HR 44 at this time. Pt encouraged to call for assistance if needed call light in reach, will monitor.

## 2020-09-18 NOTE — PROGRESS NOTES
Problem: Falls - Risk of  Goal: *Absence of Falls  Description: Document Shayla Quinn Fall Risk and appropriate interventions in the flowsheet. Outcome: Progressing Towards Goal  Note: Fall Risk Interventions:            Medication Interventions: Patient to call before getting OOB, Teach patient to arise slowly         History of Falls Interventions: Room close to nurse's station         Problem: Activity Intolerance  Goal: *Able to remain out of bed as prescribed  Outcome: Progressing Towards Goal     Problem: Patient Education: Go to Patient Education Activity  Goal: Patient/Family Education  Outcome: Progressing Towards Goal     Problem: Airway Clearance - Ineffective  Goal: Achieve or maintain patent airway  Outcome: Progressing Towards Goal     Problem: Gas Exchange - Impaired  Goal: Absence of hypoxia  Outcome: Progressing Towards Goal  Goal: Promote optimal lung function  Outcome: Progressing Towards Goal     Problem: Breathing Pattern - Ineffective  Goal: Ability to achieve and maintain a regular respiratory rate  Outcome: Progressing Towards Goal     Problem:  Body Temperature -  Risk of, Imbalanced  Goal: Ability to maintain a body temperature within defined limits  Outcome: Progressing Towards Goal  Goal: Will regain or maintain usual level of consciousness  Outcome: Progressing Towards Goal  Goal: Complications related to the disease process, condition or treatment will be avoided or minimized  Outcome: Progressing Towards Goal     Problem: Isolation Precautions - Risk of Spread of Infection  Goal: Prevent transmission of infectious organism to others  Outcome: Progressing Towards Goal     Problem: Nutrition Deficits  Goal: Optimize nutrtional status  Outcome: Progressing Towards Goal     Problem: Risk for Fluid Volume Deficit  Goal: Maintain normal heart rhythm  Outcome: Progressing Towards Goal  Goal: Maintain absence of muscle cramping  Outcome: Progressing Towards Goal  Goal: Maintain normal serum potassium, sodium, calcium, phosphorus, and pH  Outcome: Progressing Towards Goal     Problem: Loneliness or Risk for Loneliness  Goal: Demonstrate positive use of time alone when socialization is not possible  Outcome: Progressing Towards Goal     Problem: Fatigue  Goal: Verbalize increase energy and improved vitality  Outcome: Progressing Towards Goal     Problem: Patient Education: Go to Patient Education Activity  Goal: Patient/Family Education  Outcome: Progressing Towards Goal     Problem: Patient Education: Go to Patient Education Activity  Goal: Patient/Family Education  Outcome: Progressing Towards Goal       Hannah Munoz

## 2020-09-18 NOTE — PROGRESS NOTES
Pt is for discharge home today with no needs/supportive care orders received for CM at this time. Pt will have close follow up with [facility] where he goes for care. Pt declined offer to arrange for PCP referral from CM.     Milestones Met    Care Management Interventions  PCP Verified by CM: No(pt goes to [facility])  Mode of Transport at Discharge: Self  Transition of Care Consult (CM Consult): Discharge Planning  Discharge Durable Medical Equipment: No  Physical Therapy Consult: Yes  Occupational Therapy Consult: No  Speech Therapy Consult: No  Current Support Network: Own Home, Family Lives Nearby  Confirm Follow Up Transport: Family  The Plan for Transition of Care is Related to the Following Treatment Goals : no further needs  The Patient and/or Patient Representative was Provided with a Choice of Provider and Agrees with the Discharge Plan?: Yes  Name of the Patient Representative Who was Provided with a Choice of Provider and Agrees with the Discharge Plan: pt  Freedom of Choice List was Provided with Basic Dialogue that Supports the Patient's Individualized Plan of Care/Goals, Treatment Preferences and Shares the Quality Data Associated with the Providers?: Yes  [place] Resource Information Provided?: No  Discharge Location  Discharge Placement: Home

## 2020-09-18 NOTE — PROGRESS NOTES
Pt ambulating in room. No distress noted at this time. Pt remains on RA. Call light in reach, will monitor.

## 2020-09-18 NOTE — DISCHARGE SUMMARY
Hospitalist Discharge Summary     Admit Date:  2020  9:35 PM   Name:  Grace Lopez   Age:  53 y.o.  :  1970   MRN:  [id number]   PCP:  None  Treatment Team: Attending Provider: Isabell Ch MD; Utilization Review: Hever Eason RN; Care Manager: Abbie Mario RN; Consulting Provider: Kayla Reynoso NP; Primary Nurse: Tiki Mejia RN    Problem List for this Hospitalization:  Active Hospital Problems    Diagnosis Date Noted    Sinus bradycardia 2020    Acute respiratory failure with hypoxia ([address] 2020    COVID-19 virus infection 2020         Hospital Course :  Please refer to the admission H&P for details of presentation. In summary, Grace Lopez is a 53 y.o. male with  no significant medical history who is admitted due to worsening SOB with COVID+. He received conv plasma on , a course of remdesivir (EOT ) and is on decadron (EOT ). Initially requiring HFNC but now weaned to room air. PT evaluated the patient and recommending no additional therapy. His position was complicated by bradycardia. He is noted to have episodic asymptomatic bradycardia especially when sleeping. EKG and telemetry shows sinus bradycardia without any arrhythmia or heart block. Cardiology evaluate the patient and recommending no immediate intervention but outpatient follow-up within 2 to 3 weeks. He is without any postural or syncopal symptoms. He is medically stable for discharge. He has been saturating over 96% on room air for over 24hrs. RT evaluated the patient and he does not require any O2 at rest or on ambulation. Disposition: home  Activity: Activity as tolerated  Diet: DIET REGULAR  DIET NUTRITIONAL SUPPLEMENTS All Meals; Ensure Enlive ( )  Code Status: Full Code      Follow up instructions, discharge meds at bottom of this note. Plan was discussed with patient/family. All questions answered. Patient was stable at time of discharge. Patient will call a physician or return if any concerns. Diagnostic Imaging/Tests:   Xr Chest Port    Result Date: 9/12/2020  EXAM: Chest x-ray. INDICATION: Dyspnea. Covid 19. COMPARISON: Yesterday's chest x-ray. TECHNIQUE: Frontal view chest x-ray. FINDINGS: There are progressed patchy diffuse bilateral lung infiltrates. Cardiac size and mediastinal contour are within normal limits. No pneumothorax or pleural effusion is seen. IMPRESSION: Progressed patchy bilateral lung infiltrates, suspect for acute pneumonia such as Covid 19. Echocardiogram results:  No results found for this visit on 09/12/20. Procedures done this admission:  * No surgery found *    All Micro Results     None          Labs: Results:       BMP, Mg, Phos Recent Labs     09/18/20  0331 09/17/20  0310 09/16/20  0328    139 140   K 4.2 4.0 4.0   * 109* 107   CO2 23 24 25   AGAP 6* 6* 8   BUN 17 19 20   CREA 0.73* 0.78* 0.78*   CA 8.7 8.3 8.7   * 114* 120*   MG  --  2.3  --       CBC No results for input(s): WBC, RBC, HGB, HCT, PLT, GRANS, LYMPH, EOS, MONOS, BASOS, IG, ANEU, ABL, LIZZ, ABM, ABB, AIG, HGBEXT, HCTEXT, PLTEXT, HGBEXT, HCTEXT, PLTEXT in the last 72 hours.    LFT Recent Labs     09/18/20  0331 09/17/20  0310 09/16/20  0328   ALT 69* 60 59   AP 85 87 91   TP 6.9 6.9 7.4   ALB 2.9* 2.7* 2.8*   GLOB 4.0* 4.2* 4.6*   AGRAT 0.7* 0.6* 0.6*      Cardiac Testing No results found for: BNPP, BNP, CPK, RCK1, RCK2, RCK3, RCK4, CKMB, CKNDX, CKND1, TROPT, TROIQ   Coagulation Tests No results found for: PTP, INR, APTT, INREXT, INREXT   A1c No results found for: HBA1C, HGBE8, KZJ5LQQV, EHL2WZIX   Lipid Panel No results found for: CHOL, CHOLPOCT, CHOLX, CHLST, CHOLV, 021498, HDL, HDLP, LDL, LDLC, DLDLP, 960460, VLDLC, VLDL, TGLX, TRIGL, TRIGP, TGLPOCT, CHHD, CHHDX   Thyroid Panel Lab Results   Component Value Date/Time    TSH 0.975 09/17/2020 03:10 AM        Most Recent UA Lab Results   Component Value Date/Time    Color LULU 09/11/2020 01:12 PM    Appearance CLEAR 09/11/2020 01:12 PM    Specific gravity 1.027 (H) 09/11/2020 01:12 PM    pH (UA) 6.0 09/11/2020 01:12 PM    Protein 30 (A) 09/11/2020 01:12 PM    Glucose Negative 09/11/2020 01:12 PM    Ketone Negative 09/11/2020 01:12 PM    Bilirubin Negative 09/11/2020 01:12 PM    Blood Negative 09/11/2020 01:12 PM    Urobilinogen 0.2 09/11/2020 01:12 PM    Nitrites Negative 09/11/2020 01:12 PM    Leukocyte Esterase Negative 09/11/2020 01:12 PM    WBC 0-3 09/11/2020 01:12 PM    RBC 0-3 09/11/2020 01:12 PM    Epithelial cells 0-3 09/11/2020 01:12 PM    Bacteria 0 09/11/2020 01:12 PM    Casts 3-5 09/11/2020 01:12 PM        No Known Allergies  Immunization History   Administered Date(s) Administered    Tdap 02/17/2017       All Labs from Last 24 Hrs:  Recent Results (from the past 24 hour(s))   METABOLIC PANEL, COMPREHENSIVE    Collection Time: 09/18/20  3:31 AM   Result Value Ref Range    Sodium 139 136 - 145 mmol/L    Potassium 4.2 3.5 - 5.1 mmol/L    Chloride 110 (H) 98 - 107 mmol/L    CO2 23 21 - 32 mmol/L    Anion gap 6 (L) 7 - 16 mmol/L    Glucose 115 (H) 65 - 100 mg/dL    BUN 17 6 - 23 MG/DL    Creatinine 0.73 (L) 0.8 - 1.5 MG/DL    GFR est AA >60 >60 ml/min/1.73m2    GFR est non-AA >60 >60 ml/min/1.73m2    Calcium 8.7 8.3 - 10.4 MG/DL    Bilirubin, total 0.5 0.2 - 1.1 MG/DL    ALT (SGPT) 69 (H) 12 - 65 U/L    AST (SGOT) 25 15 - 37 U/L    Alk.  phosphatase 85 50 - 136 U/L    Protein, total 6.9 6.3 - 8.2 g/dL    Albumin 2.9 (L) 3.5 - 5.0 g/dL    Globulin 4.0 (H) 2.3 - 3.5 g/dL    A-G Ratio 0.7 (L) 1.2 - 3.5         Current Med List in Hospital:   Current Facility-Administered Medications   Medication Dose Route Frequency    albuterol (PROVENTIL HFA, VENTOLIN HFA, PROAIR HFA) inhaler 1 Puff  1 Puff Inhalation BID RT    ascorbic acid (vitamin C) (VITAMIN C) tablet 1,000 mg  1,000 mg Oral DAILY    sodium chloride (NS) flush 5-40 mL  5-40 mL IntraVENous Q8H    sodium chloride (NS) flush 5-40 mL  5-40 mL IntraVENous PRN    acetaminophen (TYLENOL) tablet 650 mg  650 mg Oral Q6H PRN    Or    acetaminophen (TYLENOL) suppository 650 mg  650 mg Rectal Q6H PRN    polyethylene glycol (MIRALAX) packet 17 g  17 g Oral DAILY    promethazine (PHENERGAN) tablet 12.5 mg  12.5 mg Oral Q6H PRN    Or    ondansetron (ZOFRAN) injection 4 mg  4 mg IntraVENous Q6H PRN    famotidine (PEPCID) tablet 20 mg  20 mg Oral BID    enoxaparin (LOVENOX) injection 40 mg  40 mg SubCUTAneous DAILY    albuterol (PROVENTIL VENTOLIN) nebulizer solution 2.5 mg  2.5 mg Nebulization Q4H PRN    0.9% sodium chloride infusion 250 mL  250 mL IntraVENous PRN    guaiFENesin ER (MUCINEX) tablet 1,200 mg  1,200 mg Oral BID    dexAMETHasone (DECADRON) tablet 6 mg  6 mg Oral DAILY    guaiFENesin-dextromethorphan (ROBITUSSIN DM) 100-10 mg/5 mL syrup 10 mL  10 mL Oral Q4H PRN    zinc sulfate tablet 220 mg  220 mg Oral DAILY    HYDROcodone-homatropine (HYCODAN) 5-1.5 mg/5 mL (5 mL) oral solution 5 mL  5 mL Oral Q4H PRN    influenza vaccine 2020-21 (6 mos+)(PF) (FLUARIX/FLULAVAL/FLUZONE QUAD) injection 0.5 mL  0.5 mL IntraMUSCular PRIOR TO DISCHARGE       Discharge Exam:  Patient Vitals for the past 24 hrs:   Temp Pulse Resp BP SpO2   09/18/20 1059 98 °F (36.7 °C) (!) 55 18 120/70 94 %   09/18/20 0740 98.8 °F (37.1 °C) (!) 46 19 (!) 91/58 96 %   09/18/20 0433 98.3 °F (36.8 °C) (!) 40 18 113/75 94 %   09/18/20 0402  (!) 37      09/18/20 0036 97.8 °F (36.6 °C) (!) 40 18 102/65 93 %   09/18/20 0000  (!) 38      09/17/20 2049     97 %   09/17/20 2000  (!) 34      09/17/20 1934 98.5 °F (36.9 °C) (!) 50 19 107/68 92 %   09/17/20 1543 98.4 °F (36.9 °C) (!) 52 18 110/64 92 %     Oxygen Therapy  O2 Sat (%): 94 % (09/18/20 1059)  Pulse via Oximetry: 47 beats per minute (09/17/20 2049)  O2 Device: Room air (09/18/20 0036)  O2 Flow Rate (L/min): 4 l/min (09/17/20 0829)  O2 Temperature: 41 °F (5 °C) (09/15/20 1912)  FIO2 (%): 40 % (09/17/20 0887)    Estimated body mass index is 29.09 kg/m² as calculated from the following:    Height as of this encounter: 5' 6\" (1.676 m). Weight as of this encounter: 81.7 kg (180 lb 3.2 oz). Intake/Output Summary (Last 24 hours) at 9/18/2020 1217  Last data filed at 9/18/2020 0934  Gross per 24 hour   Intake 700 ml   Output 350 ml   Net 350 ml       *Note that automatically entered I/Os may not be accurate; dependent on patient compliance with collection and accurate  by assistants. Physical Exam:   General:                     alert, awake, no acute distress. Head:                          normocephalic, atraumatic  Eyes, Ears, nose:      PERRL, EOMI. Normal conjunctiva  Neck:                          supple, non-tender. Trachea midline. Lungs:                        CTAB, no wheezing, rhonchi, rales  Cardiac:                      RRR, Normal S1 and S2. Abdomen:                  Soft, non distended, nontender, +BS  Extremities:               Warm, dry. No edema   Skin:                           No rashes, no jaundice  Neuro:              AAOx3. No gross focal neurological deficit  Psychiatric:                No anxiety, calm, cooperative       Discharge Info:   Current Discharge Medication List      START taking these medications    Details   dexAMETHasone (DECADRON) 6 mg tablet Take 1 Tab by mouth Daily (before breakfast) for 4 days. Qty: 4 Tab, Refills: 0      rivaroxaban (Xarelto) 10 mg tablet Take 1 Tab by mouth daily (with breakfast) for 8 days. Qty: 8 Tab, Refills: 0      famotidine (PEPCID) 20 mg tablet Take 1 Tab by mouth two (2) times a day. Qty: 4 Tab, Refills: 0      guaiFENesin ER (MUCINEX) 1,200 mg Ta12 ER tablet Take 1 Tab by mouth two (2) times a day for 5 days. Qty: 10 Tab, Refills: 0      zinc sulfate 220 mg tablet Take 1 Tab by mouth daily for 4 days.   Qty: 4 Tab, Refills: 0         CONTINUE these medications which have NOT CHANGED    Details   !! albuterol (PROVENTIL HFA, VENTOLIN HFA, PROAIR HFA) 90 mcg/actuation inhaler Take 2 Puffs by inhalation every four (4) hours as needed for Wheezing. Qty: 1 Inhaler, Refills: 0      promethazine (PHENERGAN) 25 mg tablet Take 1 Tab by mouth every six (6) hours as needed for Nausea. Qty: 12 Tab, Refills: 0      !! ondansetron (Zofran ODT) 4 mg disintegrating tablet Take 1 Tab by mouth every eight (8) hours as needed for Nausea. Qty: 12 Tab, Refills: 0      !! ondansetron (ZOFRAN ODT) 8 mg disintegrating tablet Take 1 Tab by mouth every eight (8) hours as needed for Nausea. Qty: 12 Tab, Refills: 1      meloxicam (Mobic) 7.5 mg tablet Take 7.5 mg by mouth daily. ascorbic acid, vitamin C, (Vitamin C) 250 mg tablet Take 250 mg by mouth daily. cholecalciferol, vitamin D3, (VITAMIN D3 PO) Take 1 Tab by mouth daily. !! albuterol (PROVENTIL HFA, VENTOLIN HFA, PROAIR HFA) 90 mcg/actuation inhaler Take 2 puffs by inhalation every four (4) hours as needed (for cough, use with spacer chamber please). Qty: 1 Inhaler, Refills: 0       !! - Potential duplicate medications found. Please discuss with provider. STOP taking these medications       doxycycline (VIBRA-TABS) 100 mg tablet Comments:   Reason for Stopping: Follow Up Orders:  No orders of the defined types were placed in this encounter. Follow-up Information     Follow up With Specialties Details Why Contact Info    [facility] CARDIOLOGY  In 3 weeks Sinus Bradycardia during hospitalization [address] [place] [id number] [phone]          Time spent in patient discharge planning and coordination 40 minutes.     Signed:  Colten Lea MD

## 2020-09-18 NOTE — PROGRESS NOTES
[facility] CARDIOLOGY PROGRESS NOTE    9/18/2020 12:24 PM    Admit Date: 9/12/2020        Subjective:   Stable overnight without angina, CHF, or palpitations. Vitals stable and controlled. No other complaints overnight. Tolerating meds well. Remains kong but sinus and asymptomatic overnight from this standpoint, likely a chronic issue. Objective:      Vitals:    09/18/20 0402 09/18/20 0433 09/18/20 0740 09/18/20 1059   BP:  113/75 (!) 91/58 120/70   Pulse: (!) 37 (!) 40 (!) 46 (!) 55   Resp:  18 19 18   Temp:  98.3 °F (36.8 °C) 98.8 °F (37.1 °C) 98 °F (36.7 °C)   SpO2:  94% 96% 94%   Weight:  180 lb 3.2 oz (81.7 kg)     Height:           Physical Exam:  Neck- supple, no JVD  CV- regular rate and rhythm no MRG  Lung- clear bilaterally  Abd- soft, nontender, nondistended  Ext- no edema  Skin- warm and dry    Data Review:   Recent Labs     09/18/20  0331 09/17/20  0310    139   K 4.2 4.0   MG  --  2.3   BUN 17 19   CREA 0.73* 0.78*   * 114*       Assessment and Plan:     Principal Problem:    Acute respiratory failure with hypoxia ([address] (9/13/2020)- improved, doing well today and ready for discharge. Active Problems:    COVID-19 virus infection (9/12/2020)      Sinus bradycardia (9/17/2020)- asymptomatic- no rate slowing meds on board. Follow clinically in outpatient setting. Echo in a few weeks when fully recovered from [place]. Ok to go home from our standpoint . Stay well hydrated. Keep office follow up in a couple of weeks.          Eddie Goodman MD  [facility] Cardiology  Pager [phone]

## 2020-09-19 ENCOUNTER — PATIENT OUTREACH (OUTPATIENT)
Dept: CASE MANAGEMENT | Age: 50
End: 2020-09-19

## 2020-09-19 NOTE — PROGRESS NOTES
RNCM attempted to reach via  services for IP ANAIS. No answer however  left  w/ RNCM name requesting pt to return call.

## 2020-09-21 ENCOUNTER — PATIENT OUTREACH (OUTPATIENT)
Dept: CASE MANAGEMENT | Age: 50
End: 2020-09-21

## 2020-09-21 NOTE — PROGRESS NOTES
RNCM 2nd unsuccessful attempt to reach pt, no answer and no name on vm. Will close ANAIS as pt is unable to be reached.

## 2020-10-05 PROBLEM — E78.5 HLD (HYPERLIPIDEMIA): Status: ACTIVE | Noted: 2020-10-05

## 2020-10-05 PROBLEM — R00.2 PALPITATIONS: Status: ACTIVE | Noted: 2020-10-05

## 2022-03-18 PROBLEM — J96.01 ACUTE RESPIRATORY FAILURE WITH HYPOXIA (HCC): Status: ACTIVE | Noted: 2020-09-13

## 2022-03-19 PROBLEM — R00.2 PALPITATIONS: Status: ACTIVE | Noted: 2020-10-05

## 2022-03-19 PROBLEM — R00.1 SINUS BRADYCARDIA: Status: ACTIVE | Noted: 2020-09-17

## 2022-03-20 PROBLEM — E78.5 HLD (HYPERLIPIDEMIA): Status: ACTIVE | Noted: 2020-10-05

## 2022-03-20 PROBLEM — U07.1 COVID-19 VIRUS INFECTION: Status: ACTIVE | Noted: 2020-09-12

## 2022-07-31 ENCOUNTER — HOSPITAL ENCOUNTER (EMERGENCY)
Age: 52
Discharge: HOME | End: 2022-07-31
Attending: EMERGENCY MEDICINE | Admitting: EMERGENCY MEDICINE
Payer: COMMERCIAL

## 2022-07-31 ENCOUNTER — HOSPITAL ENCOUNTER (EMERGENCY)
Dept: GENERAL RADIOLOGY | Age: 52
Discharge: HOME | End: 2022-08-03
Payer: COMMERCIAL

## 2022-07-31 VITALS
RESPIRATION RATE: 17 BRPM | HEART RATE: 62 BPM | OXYGEN SATURATION: 99 % | DIASTOLIC BLOOD PRESSURE: 70 MMHG | HEIGHT: 66 IN | WEIGHT: 198 LBS | SYSTOLIC BLOOD PRESSURE: 118 MMHG | BODY MASS INDEX: 31.82 KG/M2 | TEMPERATURE: 98.4 F

## 2022-07-31 DIAGNOSIS — M62.830 SPASM OF MUSCLE OF LOWER BACK: ICD-10-CM

## 2022-07-31 DIAGNOSIS — M54.41 ACUTE BILATERAL LOW BACK PAIN WITH BILATERAL SCIATICA: Primary | ICD-10-CM

## 2022-07-31 DIAGNOSIS — M54.42 ACUTE BILATERAL LOW BACK PAIN WITH BILATERAL SCIATICA: Primary | ICD-10-CM

## 2022-07-31 PROCEDURE — 72100 X-RAY EXAM L-S SPINE 2/3 VWS: CPT

## 2022-07-31 PROCEDURE — 6370000000 HC RX 637 (ALT 250 FOR IP)

## 2022-07-31 PROCEDURE — 96372 THER/PROPH/DIAG INJ SC/IM: CPT

## 2022-07-31 PROCEDURE — 99284 EMERGENCY DEPT VISIT MOD MDM: CPT

## 2022-07-31 PROCEDURE — 6360000002 HC RX W HCPCS

## 2022-07-31 RX ORDER — KETOROLAC TROMETHAMINE 10 MG/1
10 TABLET, FILM COATED ORAL EVERY 6 HOURS PRN
Qty: 20 TABLET | Refills: 0 | Status: SHIPPED | OUTPATIENT
Start: 2022-07-31 | End: 2022-08-05

## 2022-07-31 RX ORDER — METHOCARBAMOL 750 MG/1
750 TABLET, FILM COATED ORAL
Status: COMPLETED | OUTPATIENT
Start: 2022-07-31 | End: 2022-07-31

## 2022-07-31 RX ORDER — METHOCARBAMOL 500 MG/1
500 TABLET, FILM COATED ORAL 4 TIMES DAILY
Qty: 40 TABLET | Refills: 0 | Status: SHIPPED | OUTPATIENT
Start: 2022-07-31 | End: 2022-08-10

## 2022-07-31 RX ORDER — KETOROLAC TROMETHAMINE 30 MG/ML
30 INJECTION, SOLUTION INTRAMUSCULAR; INTRAVENOUS ONCE
Status: COMPLETED | OUTPATIENT
Start: 2022-07-31 | End: 2022-07-31

## 2022-07-31 RX ADMIN — METHOCARBAMOL 750 MG: 750 TABLET ORAL at 11:59

## 2022-07-31 RX ADMIN — KETOROLAC TROMETHAMINE 30 MG: 30 INJECTION, SOLUTION INTRAMUSCULAR at 11:59

## 2022-07-31 ASSESSMENT — PAIN DESCRIPTION - ORIENTATION
ORIENTATION: LOWER
ORIENTATION: LOWER

## 2022-07-31 ASSESSMENT — PAIN DESCRIPTION - DESCRIPTORS
DESCRIPTORS: SHARP
DESCRIPTORS: ACHING;SHARP

## 2022-07-31 ASSESSMENT — PAIN SCALES - GENERAL
PAINLEVEL_OUTOF10: 7
PAINLEVEL_OUTOF10: 3
PAINLEVEL_OUTOF10: 7

## 2022-07-31 ASSESSMENT — ENCOUNTER SYMPTOMS
BACK PAIN: 1
ABDOMINAL PAIN: 0
DIARRHEA: 0
VOMITING: 0
NAUSEA: 0
SHORTNESS OF BREATH: 0

## 2022-07-31 ASSESSMENT — PAIN DESCRIPTION - FREQUENCY: FREQUENCY: CONTINUOUS

## 2022-07-31 ASSESSMENT — PAIN DESCRIPTION - LOCATION
LOCATION: BACK

## 2022-07-31 ASSESSMENT — PAIN DESCRIPTION - PAIN TYPE: TYPE: ACUTE PAIN

## 2022-07-31 ASSESSMENT — PAIN - FUNCTIONAL ASSESSMENT: PAIN_FUNCTIONAL_ASSESSMENT: 0-10

## 2022-07-31 NOTE — ED TRIAGE NOTES
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    A&Ox4

## 2022-07-31 NOTE — ED PROVIDER NOTES
[facility] Emergency Department Provider Note                   PCP:                NOT ON FILE               Age: 47 y.o. Sex: male       ICD-10-CM    1. Acute bilateral low back pain with bilateral sciatica  M54.42 methocarbamol (ROBAXIN) 500 MG tablet    M54.41 ketorolac (TORADOL) 10 MG tablet      2. Spasm of muscle of lower back  M62.830 methocarbamol (ROBAXIN) 500 MG tablet          DISPOSITION Decision To Discharge 07/31/2022 12:43:13 PM        MDM  Number of Diagnoses or Management Options  Acute bilateral low back pain with bilateral sciatica  Spasm of muscle of lower back  Diagnosis management comments: Vital signs reviewed, patient stable, NAD, afebrile, nontoxic in appearance     Will obtain x-ray of lumbar spine to evaluate for any chronic changes    Patient's physical exam is very reassuring. No anesthesia, no bladder or bowel incontinence, negative straight leg test bilaterally, 5+ and equal bilateral lower extremity strength with sensation intact. Rest of patient's physical exam is reassuring. Patient given IM Toradol and Robaxin in the emergency department    Pain improved after administration of medications    I do not feel any further imaging is warranted at this time in the emergency department I do not feel any lab work is warranted at this time in the emergency department    I discussed physical exam findings, laboratory and/or imaging findings, treatment and follow-up with the patient and those who were present. I answered any questions they had. They verbalized that they understood and were in agreement with treatment and disposition. I discussed signs and symptoms that would warrant a prompt return to the emergency department with the patient. I included the signs and symptoms on discharge paperwork. Patient verbalized that they understood. Discharged home in improved condition with a prescription for Robaxin and PO Toradol.   He is to follow-up with his primary care provider tomorrow and schedule follow-up appointment. Amount and/or Complexity of Data Reviewed  Tests in the radiology section of CPT®: ordered and reviewed  Review and summarize past medical records: yes  Independent visualization of images, tracings, or specimens: yes (Independent visualization of imaging)    Risk of Complications, Morbidity, and/or Mortality  Presenting problems: moderate  Diagnostic procedures: low  Management options: low    Patient Progress  Patient progress: improved       Orders Placed This Encounter   Procedures    XR LUMBAR SPINE (2-3 VIEWS)        Lacy Maza is a 47 y.o. 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 Patient denies falling/trauma/known injury, saddle anesthesia, bladder or bowel incontinence, lower extremity weakness, chest pain, shortness of breath, fever, chills. Nothing makes patient's condition better. Nothing makes patient's condition worse. No treatments tried. The history is provided by the patient. No  was used. Review of Systems   Constitutional:  Negative for chills and fever. Respiratory:  Negative for shortness of breath. Cardiovascular:  Negative for chest pain. Gastrointestinal:  Negative for abdominal pain, diarrhea, nausea and vomiting. Musculoskeletal:  Positive for back pain. Neurological:  Negative for weakness, numbness and headaches. All other systems reviewed and are negative.     Past Medical History:   Diagnosis Date    GERD (gastroesophageal reflux disease)     managed well with PRN OTC meds Hypercholesteremia     no current meds at this time- attempted to manage with diet. Right shoulder pain         Past Surgical History:   Procedure Laterality Date    KNEE ARTHROSCOPY Right     OTHER SURGICAL HISTORY      eye surg as a child        Family History   Problem Relation Age of Onset    Anemia Mother     Hypertension Father     Parkinson's Disease Father         Social History     Socioeconomic History    Marital status:      Spouse name: None    Number of children: None    Years of education: None    Highest education level: None   Tobacco Use    Smoking status: Former     Types: Cigarettes     Quit date: 1999     Years since quittin.5    Smokeless tobacco: Never   Substance and Sexual Activity    Alcohol use: Yes    Drug use: No         Patient has no known allergies. Discharge Medication List as of 2022 12:44 PM        CONTINUE these medications which have NOT CHANGED    Details   ZINC PO Take by mouthHistorical Med      albuterol sulfate  (90 Base) MCG/ACT inhaler Inhale 2 puffs into the lungs every 4 hours as neededHistorical Med      famotidine (PEPCID) 20 MG tablet Take 20 mg by mouth 2 times dailyHistorical Med              Vitals signs and nursing note reviewed. No data found. Physical Exam  Vitals and nursing note reviewed. Constitutional:       General: He is not in acute distress. Appearance: Normal appearance. He is obese. He is not ill-appearing, toxic-appearing or diaphoretic. HENT:      Head: Normocephalic and atraumatic. Eyes:      General: No scleral icterus. Extraocular Movements: Extraocular movements intact. Conjunctiva/sclera: Conjunctivae normal.   Cardiovascular:      Rate and Rhythm: Normal rate. Pulses: Normal pulses. Heart sounds: Normal heart sounds. Pulmonary:      Effort: Pulmonary effort is normal.      Breath sounds: Normal breath sounds.    Abdominal:      General: Bowel sounds are normal. Palpations: Abdomen is soft. Tenderness: There is no abdominal tenderness. There is no right CVA tenderness, left CVA tenderness, guarding or rebound. Musculoskeletal:         General: Normal range of motion. Cervical back: Normal range of motion. Lumbar back: Spasms (Spasm bilateral lower back) and tenderness (Tenderness to palpation bilateral lower back) present. No bony tenderness. Normal range of motion. Negative right straight leg raise test and negative left straight leg raise test.        Back:    Skin:     General: Skin is warm and dry. Capillary Refill: Capillary refill takes less than 2 seconds. Neurological:      General: No focal deficit present. Mental Status: He is alert and oriented to person, place, and time. Sensory: No sensory deficit. Motor: No weakness. Gait: Gait normal.      Comments: No saddle anesthesia, no bladder or bowel incontinence  Bilateral sensation lower extremities intact  Bilateral muscle strength lower extremity 5+ and equal   Psychiatric:         Mood and Affect: Mood normal.         Behavior: Behavior normal.         Thought Content: Thought content normal.         Judgment: Judgment normal.        Procedures      Labs Reviewed - No data to display     XR LUMBAR SPINE (2-3 VIEWS)   Final Result   No acute osseous abnormality. ED Course as of 07/31/22 2042   Sun Jul 31, 2022   1230 P tStates low back pain is improving at this time after administration of Toradol and Robaxin [JG]   1236 XR LUMBAR SPINE (2-3 VIEWS)  Findings: There is no evidence of fracture, dislocation, or erosion. The  vertebral heights, alignment, bone density are maintained. Mild disc space  narrowing noted at several levels with associated small chronic degenerative  osteophytes and facet arthropathy. IMPRESSION:  No acute osseous abnormality.  [JG]      ED Course User Index  [JG] BALTA Sanches        Voice dictation software was used during the making of this note. This software is not perfect and grammatical and other typographical errors may be present. This note has not been completely proofread for errors.       Keyon Baumann, [id number]  07/31/22 2042

## 2022-07-31 NOTE — ED NOTES
I have reviewed discharge instructions with the patient. The patient verbalized understanding. Patient left ED via Discharge Method: ambulatory to Home with spouse. Opportunity for questions and clarification provided. Patient given 2 scripts. To continue your aftercare when you leave the hospital, you may receive an automated call from our care team to check in on how you are doing. This is a free service and part of our promise to provide the best care and service to meet your aftercare needs.  If you have questions, or wish to unsubscribe from this service please call [phone]. Thank you for Choosing our [facility] Emergency Department.         Ga Deras RN  07/31/22 2487

## 2022-07-31 NOTE — DISCHARGE INSTRUCTIONS
Evaluated in the emergency department today for low back pain  X-rays  are negative for any acute bony abnormality, fracture, dislocation    You do have some mild muscle spasm bilateral low back. I have written you a prescription for muscle relaxer that you were given in the emergency department called Robaxin. Take caution and to understand how this medication affects you. I have written you a prescription of an anti-inflammatory called Toradol. Commend that you take this medication with food. Do not take other NSAIDs such as ibuprofen, Mobic, Aleve while you are taking this medication.   Stop taking this medicine if you develop stomach pain or black and tarry stools    Follow-up with your primary care within the next week    Return to the emergency department if low back pain becomes worse, you have numbness around your pelvis, you have bladder or bowel incontinence, chest pain, shortness of breath, signs and symptoms of stroke, any general worsening of your condition

## 2023-10-07 ENCOUNTER — HOSPITAL ENCOUNTER (EMERGENCY)
Age: 53
Discharge: HOME | End: 2023-10-07
Payer: COMMERCIAL

## 2023-10-07 VITALS
TEMPERATURE: 97.9 F | HEART RATE: 57 BPM | DIASTOLIC BLOOD PRESSURE: 88 MMHG | BODY MASS INDEX: 31.34 KG/M2 | HEIGHT: 66 IN | RESPIRATION RATE: 20 BRPM | OXYGEN SATURATION: 98 % | SYSTOLIC BLOOD PRESSURE: 115 MMHG | WEIGHT: 195 LBS

## 2023-10-07 DIAGNOSIS — R11.2 NAUSEA VOMITING AND DIARRHEA: Primary | ICD-10-CM

## 2023-10-07 DIAGNOSIS — R19.7 NAUSEA VOMITING AND DIARRHEA: Primary | ICD-10-CM

## 2023-10-07 LAB
ALBUMIN SERPL-MCNC: 3.8 G/DL (ref 3.5–5)
ALBUMIN/GLOB SERPL: 1 (ref 0.4–1.6)
ALP SERPL-CCNC: 97 U/L (ref 50–136)
ALT SERPL-CCNC: 114 U/L (ref 12–65)
ANION GAP SERPL CALC-SCNC: 6 MMOL/L (ref 2–11)
AST SERPL-CCNC: 39 U/L (ref 15–37)
BASOPHILS # BLD: 0 K/UL (ref 0–0.2)
BASOPHILS NFR BLD: 1 % (ref 0–2)
BILIRUB SERPL-MCNC: 0.3 MG/DL (ref 0.2–1.1)
BUN SERPL-MCNC: 12 MG/DL (ref 6–23)
CALCIUM SERPL-MCNC: 8.9 MG/DL (ref 8.3–10.4)
CHLORIDE SERPL-SCNC: 113 MMOL/L (ref 101–110)
CO2 SERPL-SCNC: 23 MMOL/L (ref 21–32)
CREAT SERPL-MCNC: 1.08 MG/DL (ref 0.8–1.5)
DIFFERENTIAL METHOD BLD: ABNORMAL
EOSINOPHIL # BLD: 0.3 K/UL (ref 0–0.8)
EOSINOPHIL NFR BLD: 4 % (ref 0.5–7.8)
ERYTHROCYTE [DISTWIDTH] IN BLOOD BY AUTOMATED COUNT: 13 % (ref 11.9–14.6)
GLOBULIN SER CALC-MCNC: 4 G/DL (ref 2.8–4.5)
GLUCOSE SERPL-MCNC: 103 MG/DL (ref 65–100)
HCT VFR BLD AUTO: 40.9 % (ref 41.1–50.3)
HGB BLD-MCNC: 13.9 G/DL (ref 13.6–17.2)
IMM GRANULOCYTES # BLD AUTO: 0 K/UL (ref 0–0.5)
IMM GRANULOCYTES NFR BLD AUTO: 0 % (ref 0–5)
LIPASE SERPL-CCNC: 187 U/L (ref 73–393)
LYMPHOCYTES # BLD: 3 K/UL (ref 0.5–4.6)
LYMPHOCYTES NFR BLD: 44 % (ref 13–44)
MCH RBC QN AUTO: 31 PG (ref 26.1–32.9)
MCHC RBC AUTO-ENTMCNC: 34 G/DL (ref 31.4–35)
MCV RBC AUTO: 91.1 FL (ref 82–102)
MONOCYTES # BLD: 0.7 K/UL (ref 0.1–1.3)
MONOCYTES NFR BLD: 10 % (ref 4–12)
NEUTS SEG # BLD: 2.9 K/UL (ref 1.7–8.2)
NEUTS SEG NFR BLD: 42 % (ref 43–78)
NRBC # BLD: 0 K/UL (ref 0–0.2)
PLATELET # BLD AUTO: 237 K/UL (ref 150–450)
PMV BLD AUTO: 9.5 FL (ref 9.4–12.3)
POTASSIUM SERPL-SCNC: 3.7 MMOL/L (ref 3.5–5.1)
PROT SERPL-MCNC: 7.8 G/DL (ref 6.3–8.2)
RBC # BLD AUTO: 4.49 M/UL (ref 4.23–5.6)
SODIUM SERPL-SCNC: 142 MMOL/L (ref 133–143)
WBC # BLD AUTO: 6.8 K/UL (ref 4.3–11.1)

## 2023-10-07 PROCEDURE — 99284 EMERGENCY DEPT VISIT MOD MDM: CPT

## 2023-10-07 PROCEDURE — 2580000003 HC RX 258

## 2023-10-07 PROCEDURE — 80053 COMPREHEN METABOLIC PANEL: CPT

## 2023-10-07 PROCEDURE — 83690 ASSAY OF LIPASE: CPT

## 2023-10-07 PROCEDURE — 96360 HYDRATION IV INFUSION INIT: CPT

## 2023-10-07 PROCEDURE — 85025 COMPLETE CBC W/AUTO DIFF WBC: CPT

## 2023-10-07 RX ORDER — ONDANSETRON 4 MG/1
4 TABLET, ORALLY DISINTEGRATING ORAL 3 TIMES DAILY PRN
Qty: 21 TABLET | Refills: 0 | Status: SHIPPED | OUTPATIENT
Start: 2023-10-07

## 2023-10-07 RX ORDER — DICYCLOMINE HCL 20 MG
20 TABLET ORAL 4 TIMES DAILY
Qty: 28 TABLET | Refills: 0 | Status: SHIPPED | OUTPATIENT
Start: 2023-10-07 | End: 2023-10-14

## 2023-10-07 RX ORDER — 0.9 % SODIUM CHLORIDE 0.9 %
1000 INTRAVENOUS SOLUTION INTRAVENOUS
Status: COMPLETED | OUTPATIENT
Start: 2023-10-07 | End: 2023-10-07

## 2023-10-07 RX ADMIN — SODIUM CHLORIDE 1000 ML: 9 INJECTION, SOLUTION INTRAVENOUS at 18:26

## 2023-10-07 ASSESSMENT — LIFESTYLE VARIABLES
HOW OFTEN DO YOU HAVE A DRINK CONTAINING ALCOHOL: MONTHLY OR LESS
HOW MANY STANDARD DRINKS CONTAINING ALCOHOL DO YOU HAVE ON A TYPICAL DAY: 1 OR 2

## 2023-10-07 ASSESSMENT — PAIN - FUNCTIONAL ASSESSMENT
PAIN_FUNCTIONAL_ASSESSMENT: NONE - DENIES PAIN
PAIN_FUNCTIONAL_ASSESSMENT: NONE - DENIES PAIN

## 2023-10-07 NOTE — ED TRIAGE NOTES
Patient presents with c/o diarrhea/vomiting. Stated he just returned from [facility] last night (was there for 7 days). At home covid test neg. Denies any blood in emesis/diarrhea, fever/chills. No other family members home ill.

## 2023-10-07 NOTE — DISCHARGE INSTRUCTIONS
Please begin taking the Zofran as needed for nausea. You can take the Bentyl for abdominal cramping. Please follow-up with your primary care provider in the next 2 to 3 days to ensure improvement in your symptoms. If you do begin to experience any high fevers, uncontrolled vomiting, abdominal pain, or any new or worsening symptoms return to the ED immediately. We would love to help you get a primary care doctor for follow-up after your emergency department visit. Please call [phone] between 7AM - 6PM Monday to Friday. A care navigator will be able to assist you with setting up a doctor close to your home.

## 2024-06-20 ENCOUNTER — OFFICE VISIT (OUTPATIENT)
Dept: ORTHOPEDIC SURGERY | Age: 54
End: 2024-06-20
Payer: COMMERCIAL

## 2024-06-20 VITALS — WEIGHT: 200 LBS | BODY MASS INDEX: 32.14 KG/M2 | HEIGHT: 66 IN

## 2024-06-20 DIAGNOSIS — M25.561 RIGHT KNEE PAIN, UNSPECIFIED CHRONICITY: Primary | ICD-10-CM

## 2024-06-20 PROCEDURE — 99213 OFFICE O/P EST LOW 20 MIN: CPT | Performed by: PHYSICIAN ASSISTANT

## 2024-06-20 PROCEDURE — 20610 DRAIN/INJ JOINT/BURSA W/O US: CPT | Performed by: PHYSICIAN ASSISTANT

## 2024-06-20 RX ORDER — TRIAMCINOLONE ACETONIDE 40 MG/ML
40 INJECTION, SUSPENSION INTRA-ARTICULAR; INTRAMUSCULAR ONCE
Status: COMPLETED | OUTPATIENT
Start: 2024-06-20 | End: 2024-06-20

## 2024-06-20 RX ADMIN — TRIAMCINOLONE ACETONIDE 40 MG: 40 INJECTION, SUSPENSION INTRA-ARTICULAR; INTRAMUSCULAR at 14:43

## 2024-06-20 NOTE — PROGRESS NOTES
Name: Huang Lal  YOB: 1970  Gender: male  MRN: [id number]    CC: Knee Pain (R)     HPI: Huang Lal is a 54 y.o. male who presents with Knee Pain (R)    He has had pain for over 2 weeks. It just started hurting with no injury. He has a lot of pain and swelling after work. He notes instability when walking. He has been using a brace and that helps some. The brace tends to make his leg swell more.  He notes medial joint line pain.  He states he had a meniscectomy years ago in this right knee.  He has done well since that procedure and has not had any pain until 2 weeks ago.  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  He is here today for further evaluation of right knee pain.  He has not been taking anything for pain at this time.    ROS/Meds/PSH/PMH/FH/SH: I personally reviewed the patients standard intake form.  Below are the pertinents    Tobacco:  reports that he quit smoking about 25 years ago. His smoking use included cigarettes. He has never used smokeless tobacco.  Diabetes: none  Other: Hyperlipidemia    Physical Examination:  General: no acute distress  Lungs: breathing easily  CV: regular rhythm by pulse  Right Knee: No previous surgical scars present. Joint effusion present. Patella tracks centrally with no patellofemoral grind. Neutral mechanical alignment present. Passive ROM: 0 degrees of hyperextension with 120 degrees of flexion.  Ligamentously stable x 4.  No pain with McMurrays over medial or lateral joint line. Tender to palpate over Medial joint line.  Tender to palpate over the hamstring tendons.  Calf is soft and nontender to palpation. Motor and sensory intact distally. Dorsalis pedis pulse 2+.      Imaging:   Knee XR: 4 views     Clinical Indication  1. Right knee pain, unspecified chronicity           Report: AP, lateral, PA flexion, sunrise views of the Right knee demonstrates no acute

## 2024-06-25 ENCOUNTER — TELEPHONE (OUTPATIENT)
Dept: ORTHOPEDIC SURGERY | Age: 54
End: 2024-06-25

## 2024-06-25 NOTE — TELEPHONE ENCOUNTER
Wife calling to say patient is having severe pain with rt knee and she doesn't want to wait until next available which is 7/12. Please call or advise. Emely [phone]

## 2024-07-01 ENCOUNTER — OFFICE VISIT (OUTPATIENT)
Dept: ORTHOPEDIC SURGERY | Age: 54
End: 2024-07-01
Payer: COMMERCIAL

## 2024-07-01 DIAGNOSIS — M25.561 RIGHT KNEE PAIN, UNSPECIFIED CHRONICITY: Primary | ICD-10-CM

## 2024-07-01 PROCEDURE — L1820 KO ELAS W/ CONDYLE PADS & JO: HCPCS | Performed by: PHYSICIAN ASSISTANT

## 2024-07-01 PROCEDURE — 99213 OFFICE O/P EST LOW 20 MIN: CPT | Performed by: PHYSICIAN ASSISTANT

## 2024-07-01 RX ORDER — DICLOFENAC SODIUM 75 MG/1
75 TABLET, DELAYED RELEASE ORAL 2 TIMES DAILY
Qty: 60 TABLET | Refills: 1 | Status: SHIPPED | OUTPATIENT
Start: 2024-07-01

## 2024-07-01 NOTE — PROGRESS NOTES
Name: Huang Lal  YOB: 1970  Gender: male  MRN: [id number]    CC: Knee Pain (R)    HPI: Huang Lal is a 54 y.o. male who returns for follow up on  his right knee. He states he is getting better, but is still having pain along the medial aspect of his knee.  He states he is here for further evaluation of right knee pain.  He does not have a large effusion today like he did at his last appointment.  He does wear a knee sleeve every now and then.  He is headed to [place] with his wife on the seventh and want to get reevaluated before he leaves.    Physical Examination:  General: no acute distress  Lungs: breathing easily  CV: regular rhythm by pulse  Right Knee: No previous surgical scars present. No joint effusion present. Patella tracks centrally with no patellofemoral grind. Neutral mechanical alignment present. Passive ROM: 0 degrees of hyperextension with 120 degrees of flexion. Stable to varus and valgus stress at full extension and 30 degrees of flexion. No pain with McMurrays over medial or lateral joint line. Tender to palpate over Medial joint line. negative Apprehension test. Calf is soft and nontender to palpation. Motor and sensory intact distally. Dorsalis pedis pulse 2+.      Assessment:     ICD-10-CM    1. Right knee pain, unspecified chronicity  M25.561 Ambulatory Referral to DME     Ambulatory Referral to Ortho Injection     diclofenac (VOLTAREN) 75 MG EC tablet        Plan:     Discussed with  that his pain is coming from his advanced arthritic changes.  I do think he will improve with Visco injections.  We discussed risk benefits and indications of these injections and he would like to proceed.  He does leave for [place] and will not be able to start this round of injections until he returns.  In the meantime I will send in some oral medicine and get him a hinged knee sleeve to help him on his trip. He will benefit from a hinge knee brace while

## 2024-07-02 NOTE — PROGRESS NOTES
Reddie Hinge brace for patients right knee. I explained how the hinge on each side of the brace should line up with the patella. The patient was also instructed to tighten the strap distal to the patella first to insure the brace is anchored and prevents slipping, , then the top strap should be tightened.    Patient read and signed documenting they understand and agree to [facility]'s current DME return policy.

## 2024-07-26 NOTE — PROGRESS NOTES
Name: Huang Lal  YOB: 1970  Gender: male  MRN: [id number]    CC: Knee Pain (R)     HPI: Huang Lal is a 54 y.o. male who presents with Knee Pain (R)  He is here for her first Euflexxa injection.    Physical Examination:  General: no acute distress  right Knee: Exam is unchanged, the knee continues to be painful with palpation and range of motion. There is no effusion or concern for infection.    Assessment:   1. Right knee pain, unspecified chronicity    2. Osteoarthritis of right knee, unspecified osteoarthritis type       Plan:     Right knee injected from a anterior lateral approach with 2 mL Euflexxa viscosupplementation after sterile prep.  Patient tolerated the procedure well was given postinjection flare precautions.      Follow up next week for round 2.    DANIELA MistryC  Orthopaedics and Sports Medicine

## 2024-07-29 ENCOUNTER — OFFICE VISIT (OUTPATIENT)
Dept: ORTHOPEDIC SURGERY | Age: 54
End: 2024-07-29
Payer: COMMERCIAL

## 2024-07-29 DIAGNOSIS — M25.561 RIGHT KNEE PAIN, UNSPECIFIED CHRONICITY: Primary | ICD-10-CM

## 2024-07-29 DIAGNOSIS — M17.11 OSTEOARTHRITIS OF RIGHT KNEE, UNSPECIFIED OSTEOARTHRITIS TYPE: ICD-10-CM

## 2024-07-29 PROCEDURE — 20610 DRAIN/INJ JOINT/BURSA W/O US: CPT | Performed by: PHYSICIAN ASSISTANT

## 2024-07-29 RX ORDER — HYALURONATE SODIUM 10 MG/ML
20 SYRINGE (ML) INTRAARTICULAR ONCE
Status: COMPLETED | OUTPATIENT
Start: 2024-07-29 | End: 2024-07-29

## 2024-07-29 RX ADMIN — Medication 20 MG: at 11:15

## 2024-08-01 NOTE — PROGRESS NOTES
Name: Huang Lal  YOB: 1970  Gender: male  MRN: [id number]      CC: Knee Pain (R)       HPI: Huang Lal is a 54 y.o. male who presents with Knee Pain (R)  .  Returns today for viscosupplementation injection #2        Physical Examination:  General: no acute distress  right Knee: Exam is unchanged, the knee continues to be painful with palpation and range of motion. There is no effusion or concern for infection.        Assessment:   1. Osteoarthritis of right knee, unspecified osteoarthritis type         Plan:     Right knee injected from a anterior lateral approach with 2 mL Euflexxa viscosupplementation after sterile prep.  Patient tolerated the procedure well was given postinjection flare precautions.      Follow up 1 week with Faviola Alfaro MD, FAAOS  Orthopaedics and Sports Medicine

## 2024-08-05 ENCOUNTER — OFFICE VISIT (OUTPATIENT)
Dept: ORTHOPEDIC SURGERY | Age: 54
End: 2024-08-05
Payer: COMMERCIAL

## 2024-08-05 DIAGNOSIS — M17.11 OSTEOARTHRITIS OF RIGHT KNEE, UNSPECIFIED OSTEOARTHRITIS TYPE: Primary | ICD-10-CM

## 2024-08-05 PROCEDURE — 20610 DRAIN/INJ JOINT/BURSA W/O US: CPT | Performed by: ORTHOPAEDIC SURGERY

## 2024-08-05 RX ORDER — HYALURONATE SODIUM 10 MG/ML
20 SYRINGE (ML) INTRAARTICULAR ONCE
Status: COMPLETED | OUTPATIENT
Start: 2024-08-05 | End: 2024-08-05

## 2024-08-05 RX ADMIN — Medication 20 MG: at 10:01

## 2024-08-09 NOTE — PROGRESS NOTES
Name: Huang Lal  YOB: 1970  Gender: male  MRN: [id number]    CC: Knee Pain (R)      HPI: Huang Lal is a 54 y.o. male who presents with right knee pain. She is here today for her 3rd injection of viscosupplementation.     Physical Examination:  General: no acute distress  right Knee: Exam is unchanged, the knee continues to be painful with palpation and range of motion. There is no effusion or concern for infection.    Assessment:   1. Osteoarthritis of right knee, unspecified osteoarthritis type    2. Right knee pain, unspecified chronicity         Plan:     Right knee injected from a anterior lateral approach with 2 mL Euflexxa viscosupplementation after sterile prep.  Patient tolerated the procedure well was given postinjection flare precautions.      Follow up 6 weeks.    Shantel Knight PA-C  Orthopaedics and Sports Medicine

## 2024-08-12 ENCOUNTER — OFFICE VISIT (OUTPATIENT)
Dept: ORTHOPEDIC SURGERY | Age: 54
End: 2024-08-12
Payer: COMMERCIAL

## 2024-08-12 DIAGNOSIS — M17.11 OSTEOARTHRITIS OF RIGHT KNEE, UNSPECIFIED OSTEOARTHRITIS TYPE: Primary | ICD-10-CM

## 2024-08-12 DIAGNOSIS — M25.561 RIGHT KNEE PAIN, UNSPECIFIED CHRONICITY: ICD-10-CM

## 2024-08-12 PROCEDURE — 20610 DRAIN/INJ JOINT/BURSA W/O US: CPT | Performed by: PHYSICIAN ASSISTANT

## 2024-08-12 RX ORDER — HYALURONATE SODIUM 10 MG/ML
20 SYRINGE (ML) INTRAARTICULAR ONCE
Status: COMPLETED | OUTPATIENT
Start: 2024-08-12 | End: 2024-08-12

## 2024-08-12 RX ADMIN — Medication 20 MG: at 13:38

## 2024-08-13 ENCOUNTER — TELEPHONE (OUTPATIENT)
Dept: ORTHOPEDIC SURGERY | Age: 54
End: 2024-08-13

## 2024-08-13 DIAGNOSIS — M17.11 OSTEOARTHRITIS OF RIGHT KNEE, UNSPECIFIED OSTEOARTHRITIS TYPE: Primary | ICD-10-CM

## 2024-08-13 RX ORDER — DICLOFENAC SODIUM 75 MG/1
75 TABLET, DELAYED RELEASE ORAL 2 TIMES DAILY
Qty: 60 TABLET | Refills: 1 | Status: CANCELLED | OUTPATIENT
Start: 2024-08-13

## 2024-08-13 NOTE — TELEPHONE ENCOUNTER
He was supposed to have some medicine sent in yesterday but it wasn't sent. Please send to the [facility] on file.

## 2024-08-19 ENCOUNTER — TELEPHONE (OUTPATIENT)
Dept: ORTHOPEDIC SURGERY | Age: 54
End: 2024-08-19

## 2024-08-19 DIAGNOSIS — M25.561 RIGHT KNEE PAIN, UNSPECIFIED CHRONICITY: ICD-10-CM

## 2024-08-19 NOTE — TELEPHONE ENCOUNTER
Pts wife called and the injection didn't work and is having a lot of pain and swelling. Please call her back.

## 2024-08-20 RX ORDER — DICLOFENAC SODIUM 75 MG/1
75 TABLET, DELAYED RELEASE ORAL 2 TIMES DAILY
Qty: 60 TABLET | Refills: 1 | Status: SHIPPED | OUTPATIENT
Start: 2024-08-20

## 2024-09-04 NOTE — PROGRESS NOTES
Name: Huang Lal  YOB: 1970  Gender: male  MRN: [id number]    CC: Knee Pain (R)      HPI: Huang Lal is a 54 y.o. male who returns for follow up on right knee pain. He completed his gel series on 8/12/24 and has had no improvement.  He states he got more improvement from his last cortisone injection than he did from his Visco series.  He complains of the knee continuing to swell throughout the day.  He does continue to ice at the end of the day and take a Motrin every now and then.  He states the diclofenac does not help with his pain.  He has been working on a home exercise program.  He states he is not happy with the progress that he seen with this knee.  At this point he does have advanced arthritis throughout his knee and would like to discuss further treatments.    Physical Examination:  General: no acute distress  Lungs: breathing easily  CV: regular rhythm by pulse  Right Knee: Varus alignment noted.  No significant joint effusion noted today.  2+ patellofemoral grind noted.  Stable to varus and valgus stress at full extension and 30 degrees of flexion.  Range of motion 0 to 130 degrees.  Calf is soft and nontender to palpation.  Dorsi and plantarflexion mechanism intact.  Dorsalis pedis 2+.    Assessment:     ICD-10-CM    1. Right knee pain, unspecified chronicity  M25.561 DRAIN/INJECT LARGE JOINT/BURSA     triamcinolone acetonide (KENALOG-40) injection 40 mg     Ambulatory referral to Orthopedic Surgery      2. Osteoarthritis of right knee, unspecified osteoarthritis type  M17.11 DRAIN/INJECT LARGE JOINT/BURSA     triamcinolone acetonide (KENALOG-40) injection 40 mg     Ambulatory referral to Orthopedic Surgery          Plan:     At this point we failed conservative managements of physical therapy, NSAID therapy, cortisone injection and gel injections.  We can proceed with another cortisone injection today and we discussed risk benefits and indications of this and

## 2024-09-05 ENCOUNTER — OFFICE VISIT (OUTPATIENT)
Dept: ORTHOPEDIC SURGERY | Age: 54
End: 2024-09-05
Payer: COMMERCIAL

## 2024-09-05 DIAGNOSIS — M25.561 RIGHT KNEE PAIN, UNSPECIFIED CHRONICITY: Primary | ICD-10-CM

## 2024-09-05 DIAGNOSIS — M17.11 OSTEOARTHRITIS OF RIGHT KNEE, UNSPECIFIED OSTEOARTHRITIS TYPE: ICD-10-CM

## 2024-09-05 PROCEDURE — 99213 OFFICE O/P EST LOW 20 MIN: CPT | Performed by: PHYSICIAN ASSISTANT

## 2024-09-05 PROCEDURE — 20610 DRAIN/INJ JOINT/BURSA W/O US: CPT | Performed by: PHYSICIAN ASSISTANT

## 2024-09-05 RX ORDER — TRIAMCINOLONE ACETONIDE 40 MG/ML
40 INJECTION, SUSPENSION INTRA-ARTICULAR; INTRAMUSCULAR ONCE
Status: COMPLETED | OUTPATIENT
Start: 2024-09-05 | End: 2024-09-05

## 2024-09-05 RX ORDER — MELOXICAM 15 MG/1
15 TABLET ORAL DAILY
Qty: 30 TABLET | Refills: 1 | Status: SHIPPED | OUTPATIENT
Start: 2024-09-05

## 2024-09-05 RX ADMIN — TRIAMCINOLONE ACETONIDE 40 MG: 40 INJECTION, SUSPENSION INTRA-ARTICULAR; INTRAMUSCULAR at 09:22

## 2024-09-30 ENCOUNTER — TELEPHONE (OUTPATIENT)
Dept: ORTHOPEDIC SURGERY | Age: 54
End: 2024-09-30

## 2024-10-11 ENCOUNTER — OFFICE VISIT (OUTPATIENT)
Dept: ORTHOPEDIC SURGERY | Age: 54
End: 2024-10-11
Payer: COMMERCIAL

## 2024-10-11 VITALS — WEIGHT: 208.2 LBS | HEIGHT: 65 IN | BODY MASS INDEX: 34.69 KG/M2

## 2024-10-11 DIAGNOSIS — M17.11 PRIMARY OSTEOARTHRITIS OF RIGHT KNEE: ICD-10-CM

## 2024-10-11 DIAGNOSIS — M25.561 RIGHT KNEE PAIN, UNSPECIFIED CHRONICITY: Primary | ICD-10-CM

## 2024-10-11 PROCEDURE — 99214 OFFICE O/P EST MOD 30 MIN: CPT | Performed by: ORTHOPAEDIC SURGERY

## 2024-10-11 NOTE — PROGRESS NOTES
Name: Huang Lal  YOB: 1970  Gender: male  MRN: [id number]    CC: Right knee pain and instability    HPI: Huang Lal is a 54 y.o. male who presents with a longstanding history of progressive right knee pain.  He had knee arthroscopy by Dr. Rocky Moctezuma in 2011.  He has been followed more recently by Dr. Pa Alfaro and his team.  He has had more recently injections both cortisone and viscosupplementation.  Cortisone helped him for short periods of time viscosupplementation has not been beneficial.  He describes pain with activity and swelling.  He has pain getting up and down and he states he does not feel secure with the knee and feels that this can make him fall at times.    He does use a brace on the joint which does help while working.  He is otherwise relatively healthy and with progressive symptomatology and declining function comes in today for further recommendations and treatment.    History was obtained from patient and he does work as a self-employed     ROS/Meds/PSH/PMH/FH/SH: I personally reviewed the patients standard intake form.  Below are the pertinents    Tobacco:  reports that he quit smoking about 25 years ago. His smoking use included cigarettes. He has never used smokeless tobacco.  Past Medical History:   Diagnosis Date    GERD (gastroesophageal reflux disease)     managed well with PRN OTC meds    Hypercholesteremia     no current meds at this time- attempted to manage with diet.    Right shoulder pain       Past Surgical History:   Procedure Laterality Date    KNEE ARTHROSCOPY Right     OTHER SURGICAL HISTORY      eye surg as a child        Physical Examination:  Physical exam: On examination of the patient's gait there is there is varus deformity in the right knee    On examination while standing there is decreased flexion in the lumbosacral spine without acute list or spasm.    While seated ,  the Bilateral hip is examined there is full range

## 2024-10-16 ENCOUNTER — TELEPHONE (OUTPATIENT)
Dept: ORTHOPEDIC SURGERY | Age: 54
End: 2024-10-16

## 2024-11-06 ENCOUNTER — TELEPHONE (OUTPATIENT)
Dept: ORTHOPEDIC SURGERY | Age: 54
End: 2024-11-06

## 2024-11-06 NOTE — TELEPHONE ENCOUNTER
Please try  to call pt  again to set up his surgery    You have  a note from 10/16  that you had  called  him

## 2025-02-18 ENCOUNTER — TELEPHONE (OUTPATIENT)
Dept: ORTHOPEDIC SURGERY | Age: 55
End: 2025-02-18

## 2025-02-18 NOTE — TELEPHONE ENCOUNTER
He is having surgery in April. He wants to know if he can have a steroid inj before his surgery. Please give him a call.

## 2025-03-04 DIAGNOSIS — M17.11 PRIMARY OSTEOARTHRITIS OF RIGHT KNEE: Primary | ICD-10-CM

## 2025-03-05 PROBLEM — M17.11 OSTEOARTHRITIS OF RIGHT KNEE: Status: ACTIVE | Noted: 2025-03-04

## 2025-03-18 ENCOUNTER — PREP FOR PROCEDURE (OUTPATIENT)
Dept: ORTHOPEDIC SURGERY | Age: 55
End: 2025-03-18

## 2025-03-18 DIAGNOSIS — M17.11 PRIMARY OSTEOARTHRITIS OF RIGHT KNEE: Primary | ICD-10-CM

## 2025-03-18 RX ORDER — ACETAMINOPHEN 500 MG
1000 TABLET ORAL ONCE
Status: CANCELLED | OUTPATIENT
Start: 2025-03-18 | End: 2025-03-18

## 2025-04-01 ENCOUNTER — HOSPITAL ENCOUNTER (OUTPATIENT)
Dept: REHABILITATION | Age: 55
Discharge: HOME | End: 2025-04-04
Payer: COMMERCIAL

## 2025-04-01 ENCOUNTER — HOSPITAL ENCOUNTER (OUTPATIENT)
Dept: SURGERY | Age: 55
Discharge: HOME | End: 2025-04-04
Payer: COMMERCIAL

## 2025-04-01 VITALS
WEIGHT: 200 LBS | HEIGHT: 66 IN | SYSTOLIC BLOOD PRESSURE: 116 MMHG | RESPIRATION RATE: 16 BRPM | OXYGEN SATURATION: 97 % | BODY MASS INDEX: 32.14 KG/M2 | DIASTOLIC BLOOD PRESSURE: 78 MMHG | TEMPERATURE: 98.3 F | HEART RATE: 56 BPM

## 2025-04-01 DIAGNOSIS — M17.11 PRIMARY OSTEOARTHRITIS OF RIGHT KNEE: ICD-10-CM

## 2025-04-01 LAB
ALBUMIN SERPL-MCNC: 3.6 G/DL (ref 3.5–5)
ALBUMIN/GLOB SERPL: 0.9 (ref 1–1.9)
ALP SERPL-CCNC: 92 U/L (ref 40–129)
ALT SERPL-CCNC: 59 U/L (ref 8–55)
ANION GAP SERPL CALC-SCNC: 11 MMOL/L (ref 7–16)
AST SERPL-CCNC: 29 U/L (ref 15–37)
BASOPHILS # BLD: 0.07 K/UL (ref 0–0.2)
BASOPHILS NFR BLD: 1 % (ref 0–2)
BILIRUB SERPL-MCNC: 0.3 MG/DL (ref 0–1.2)
BUN SERPL-MCNC: 15 MG/DL (ref 6–23)
CALCIUM SERPL-MCNC: 9.2 MG/DL (ref 8.8–10.2)
CHLORIDE SERPL-SCNC: 108 MMOL/L (ref 98–107)
CO2 SERPL-SCNC: 21 MMOL/L (ref 20–29)
CREAT SERPL-MCNC: 0.86 MG/DL (ref 0.8–1.3)
DIFFERENTIAL METHOD BLD: ABNORMAL
EKG ATRIAL RATE: 61 BPM
EKG DIAGNOSIS: NORMAL
EKG P AXIS: 33 DEGREES
EKG P-R INTERVAL: 188 MS
EKG Q-T INTERVAL: 418 MS
EKG QRS DURATION: 106 MS
EKG QTC CALCULATION (BAZETT): 420 MS
EKG R AXIS: 16 DEGREES
EKG T AXIS: 37 DEGREES
EKG VENTRICULAR RATE: 61 BPM
EOSINOPHIL # BLD: 0.32 K/UL (ref 0–0.8)
EOSINOPHIL NFR BLD: 4.6 % (ref 0.5–7.8)
ERYTHROCYTE [DISTWIDTH] IN BLOOD BY AUTOMATED COUNT: 13.2 % (ref 11.9–14.6)
EST. AVERAGE GLUCOSE BLD GHB EST-MCNC: 124 MG/DL
GLOBULIN SER CALC-MCNC: 3.9 G/DL (ref 2.3–3.5)
GLUCOSE SERPL-MCNC: 152 MG/DL (ref 70–99)
HBA1C MFR BLD: 6 % (ref 0–5.6)
HCT VFR BLD AUTO: 41.3 % (ref 41.1–50.3)
HGB BLD-MCNC: 13.8 G/DL (ref 13.6–17.2)
IMM GRANULOCYTES # BLD AUTO: 0.03 K/UL (ref 0–0.5)
IMM GRANULOCYTES NFR BLD AUTO: 0.4 % (ref 0–5)
INR PPP: 1
LYMPHOCYTES # BLD: 2.87 K/UL (ref 0.5–4.6)
LYMPHOCYTES NFR BLD: 41.5 % (ref 13–44)
MCH RBC QN AUTO: 30.8 PG (ref 26.1–32.9)
MCHC RBC AUTO-ENTMCNC: 33.4 G/DL (ref 31.4–35)
MCV RBC AUTO: 92.2 FL (ref 82–102)
MONOCYTES # BLD: 0.68 K/UL (ref 0.1–1.3)
MONOCYTES NFR BLD: 9.8 % (ref 4–12)
MRSA DNA SPEC QL NAA+PROBE: NOT DETECTED
NEUTS SEG # BLD: 2.94 K/UL (ref 1.7–8.2)
NEUTS SEG NFR BLD: 42.7 % (ref 43–78)
NRBC # BLD: 0 K/UL (ref 0–0.2)
PLATELET # BLD AUTO: 272 K/UL (ref 150–450)
PMV BLD AUTO: 9.7 FL (ref 9.4–12.3)
POTASSIUM SERPL-SCNC: 3.8 MMOL/L (ref 3.5–5.1)
PROT SERPL-MCNC: 7.5 G/DL (ref 6.3–8.2)
PROTHROMBIN TIME: 13.1 SEC (ref 11.3–14.9)
RBC # BLD AUTO: 4.48 M/UL (ref 4.23–5.6)
S AUREUS CPE NOSE QL NAA+PROBE: DETECTED
SODIUM SERPL-SCNC: 140 MMOL/L (ref 136–145)
WBC # BLD AUTO: 6.9 K/UL (ref 4.3–11.1)

## 2025-04-01 PROCEDURE — 87641 MR-STAPH DNA AMP PROBE: CPT

## 2025-04-01 PROCEDURE — 85610 PROTHROMBIN TIME: CPT

## 2025-04-01 PROCEDURE — 93010 ELECTROCARDIOGRAM REPORT: CPT | Performed by: INTERNAL MEDICINE

## 2025-04-01 PROCEDURE — 93005 ELECTROCARDIOGRAM TRACING: CPT | Performed by: ANESTHESIOLOGY

## 2025-04-01 PROCEDURE — 85025 COMPLETE CBC W/AUTO DIFF WBC: CPT

## 2025-04-01 PROCEDURE — 97161 PT EVAL LOW COMPLEX 20 MIN: CPT

## 2025-04-01 PROCEDURE — 83036 HEMOGLOBIN GLYCOSYLATED A1C: CPT

## 2025-04-01 PROCEDURE — 94760 N-INVAS EAR/PLS OXIMETRY 1: CPT

## 2025-04-01 PROCEDURE — 80053 COMPREHEN METABOLIC PANEL: CPT

## 2025-04-01 PROCEDURE — 98960 EDU&TRN PT SELF-MGMT NQHP 1: CPT

## 2025-04-01 RX ORDER — CETIRIZINE HYDROCHLORIDE, PSEUDOEPHEDRINE HYDROCHLORIDE 5; 120 MG/1; MG/1
1 TABLET, FILM COATED, EXTENDED RELEASE ORAL 2 TIMES DAILY
COMMUNITY

## 2025-04-01 ASSESSMENT — PAIN DESCRIPTION - LOCATION: LOCATION: KNEE

## 2025-04-01 ASSESSMENT — KOOS JR
STRAIGHTENING KNEE FULLY: MODERATE
RISING FROM SITTING: MILD
STANDING UPRIGHT: MILD
KOOS JR TOTAL INTERVAL SCORE: 50.012
HOW SEVERE IS YOUR KNEE STIFFNESS AFTER FIRST WAKING IN MORNING: MILD
TWISING OR PIVOTING ON KNEE: MODERATE
BENDING TO THE FLOOR TO PICK UP OBJECT: EXTREME
GOING UP OR DOWN STAIRS: EXTREME

## 2025-04-01 ASSESSMENT — PAIN DESCRIPTION - DESCRIPTORS: DESCRIPTORS: ACHING;SHARP;SHOOTING

## 2025-04-01 ASSESSMENT — PAIN SCALES - GENERAL: PAINLEVEL_OUTOF10: 6

## 2025-04-01 ASSESSMENT — PAIN DESCRIPTION - ORIENTATION: ORIENTATION: RIGHT;ANTERIOR;INNER;OUTER

## 2025-04-01 ASSESSMENT — PULMONARY FUNCTION TESTS
FEV1 (%PREDICTED): 100
FEV1 (LITERS): 3.07

## 2025-04-01 NOTE — PROGRESS NOTES
Huang Lal  : 1970  Primary: [employer] [facility]  Secondary:  [facility] [place] [address]  [phone]      Physical Therapy Prehab Evaluation Summary:2025   Time In/Out   PT Charge Capture  Episode     MEDICAL/REFERRING DIAGNOSIS: Unilateral primary osteoarthritis, right knee [M17.11]  REFERRING PHYSICIAN: David Coleman MD    Treatment Diagnosis:   Pain in Right Knee (M25.561)  Stiffness of Right Knee, Not elsewhere classified (M25.661)    DATE OF SURGERY: 25  Assessment:   COMMENTS:  Mr. Jose Lal is present for a Prehab Physical Therapy Assessment for their upcoming right TKA. They are here with his spouse . After discussing the surgical admission options and discharge plans, they are planning on discharging after one night in the hospital.    His wife will offer assist at DC. He has a cane, but will need a RW prior to DC. His R LE is 1/\" shorter than the L.    PROBLEM LIST:   (Impacting functional limitations):  Mr. Jose Lal presents with the following lower extremity(s) problems:  Strength  Range of Motion  Home Exercise Program  Pain INTERVENTIONS PLANNED:   (Benefits and precautions of physical therapy have been discussed with the patient.)  Home Exercise Program  Educational Discussion       GOALS: (Goals have been discussed and agreed upon with patient.)  Discharge Goals: Time Frame: 1 Day  Patient will demonstrate independence with a home exercise program designed to increase strength, range of motion, and pain control to minimize functional deficits and optimize patient for total joint replacement.    Subjective:   Past Medical History/Comorbidities:   Mr. Jose Lal  has a past medical history of Asthma, Fatty liver, GERD (gastroesophageal reflux disease), Hypercholesteremia, and Right shoulder pain.  Mr. Jose Lal  has a past surgical history that includes Knee arthroscopy (Right); other

## 2025-04-01 NOTE — PROGRESS NOTES
ALT elevated. Per Duane Robison OK to proceed. All other labs/EKG within anesthesia guidelines, no follow-up required. Labs automatically routed to ordering provider via Epic documentation.

## 2025-04-02 ASSESSMENT — PROMIS GLOBAL HEALTH SCALE
SUM OF RESPONSES TO QUESTIONS 2, 4, 5, & 10: 12
WHO IS THE PERSON COMPLETING THE PROMIS V1.1 SURVEY?: SELF
IN GENERAL, WOULD YOU SAY YOUR HEALTH IS...[ON A SCALE OF 1 (POOR) TO 5 (EXCELLENT)]: GOOD
SUM OF RESPONSES TO QUESTIONS 3, 6, 7, & 8: 13
IN GENERAL, HOW WOULD YOU RATE YOUR MENTAL HEALTH, INCLUDING YOUR MOOD AND YOUR ABILITY TO THINK [ON A SCALE OF 1 (POOR) TO 5 (EXCELLENT)]?: GOOD
IN GENERAL, HOW WOULD YOU RATE YOUR PHYSICAL HEALTH [ON A SCALE OF 1 (POOR) TO 5 (EXCELLENT)]?: GOOD
IN GENERAL, PLEASE RATE HOW WELL YOU CARRY OUT YOUR USUAL SOCIAL ACTIVITIES (INCLUDES ACTIVITIES AT HOME, AT WORK, AND IN YOUR COMMUNITY, AND RESPONSIBILITIES AS A PARENT, CHILD, SPOUSE, EMPLOYEE, FRIEND, ETC) [ON A SCALE OF 1 (POOR) TO 5 (EXCELLENT)]?: GOOD
IN GENERAL, WOULD YOU SAY YOUR QUALITY OF LIFE IS...[ON A SCALE OF 1 (POOR) TO 5 (EXCELLENT)]: GOOD
TO WHAT EXTENT ARE YOU ABLE TO CARRY OUT YOUR EVERYDAY PHYSICAL ACTIVITIES SUCH AS WALKING, CLIMBING STAIRS, CARRYING GROCERIES, OR MOVING A CHAIR [ON A SCALE OF 1 (NOT AT ALL) TO 5 (COMPLETELY)]?: MODERATELY
IN THE PAST 7 DAYS, HOW OFTEN HAVE YOU BEEN BOTHERED BY EMOTIONAL PROBLEMS, SUCH AS FEELING ANXIOUS, DEPRESSED, OR IRRITABLE [ON A SCALE FROM 1 (NEVER) TO 5 (ALWAYS)]?: SOMETIMES
IN THE PAST 7 DAYS, HOW WOULD YOU RATE YOUR FATIGUE ON AVERAGE [ON A SCALE FROM 1 (NONE) TO 5 (VERY SEVERE)]?: MODERATE
HOW IS THE PROMIS V1.1 BEING ADMINISTERED?: PAPER
IN GENERAL, HOW WOULD YOU RATE YOUR SATISFACTION WITH YOUR SOCIAL ACTIVITIES AND RELATIONSHIPS [ON A SCALE OF 1 (POOR) TO 5 (EXCELLENT)]?: GOOD
IN THE PAST 7 DAYS, HOW WOULD YOU RATE YOUR PAIN ON AVERAGE [ON A SCALE FROM 0 (NO PAIN) TO 10 (WORST IMAGINABLE PAIN)]?: 4

## 2025-04-18 DIAGNOSIS — M17.11 PRIMARY OSTEOARTHRITIS OF RIGHT KNEE: Primary | ICD-10-CM

## 2025-04-21 ENCOUNTER — ANESTHESIA EVENT (OUTPATIENT)
Dept: SURGERY | Age: 55
End: 2025-04-21
Payer: COMMERCIAL

## 2025-04-21 RX ORDER — CELECOXIB 200 MG/1
200 CAPSULE ORAL DAILY
Qty: 30 CAPSULE | Refills: 0 | Status: SHIPPED | OUTPATIENT
Start: 2025-04-21

## 2025-04-21 RX ORDER — SODIUM CHLORIDE 9 MG/ML
INJECTION, SOLUTION INTRAVENOUS PRN
Status: CANCELLED | OUTPATIENT
Start: 2025-04-21

## 2025-04-21 RX ORDER — SODIUM CHLORIDE 0.9 % (FLUSH) 0.9 %
5-40 SYRINGE (ML) INJECTION PRN
Status: CANCELLED | OUTPATIENT
Start: 2025-04-21

## 2025-04-21 RX ORDER — OXYCODONE HYDROCHLORIDE 5 MG/1
5-10 TABLET ORAL EVERY 4 HOURS PRN
Qty: 60 TABLET | Refills: 0 | Status: SHIPPED | OUTPATIENT
Start: 2025-04-21 | End: 2025-04-28

## 2025-04-21 RX ORDER — SODIUM CHLORIDE 0.9 % (FLUSH) 0.9 %
5-40 SYRINGE (ML) INJECTION EVERY 12 HOURS SCHEDULED
Status: CANCELLED | OUTPATIENT
Start: 2025-04-21

## 2025-04-21 RX ORDER — ASPIRIN 81 MG/1
81 TABLET ORAL 2 TIMES DAILY
Qty: 60 TABLET | Refills: 0 | Status: SHIPPED | OUTPATIENT
Start: 2025-04-21

## 2025-04-21 RX ORDER — METHOCARBAMOL 750 MG/1
TABLET, FILM COATED ORAL
Qty: 40 TABLET | Refills: 0 | Status: SHIPPED | OUTPATIENT
Start: 2025-04-21

## 2025-04-21 RX ORDER — ONDANSETRON 4 MG/1
4 TABLET, FILM COATED ORAL EVERY 6 HOURS PRN
Qty: 30 TABLET | Refills: 0 | Status: SHIPPED | OUTPATIENT
Start: 2025-04-21

## 2025-04-21 NOTE — H&P
H&P    Patient ID:  Huang Lal  [id number]  55 y.o.  1970  Surgeon:  David Coleman MD  Date of Surgery: * No surgery found *  Procedure: Robot assisted right Total Knee Arthroplasty  Primary Care Physician: File, Not On        Subjective:  Huang Lal is a 55 y.o.  /   White (non-) male who presents with right knee pain.  They have a history of right knee pain for several months. Symptoms worse with walking long distances and relieved with rest. Conservative treatment consisting of  activity modification and injections have not helped. The patient lives with their family. The patients goal after surgery is improved pain and function.        Past Medical History:   Diagnosis Date    Asthma     Fatty liver     GERD (gastroesophageal reflux disease)     managed well with PRN OTC meds    Hypercholesteremia     no current meds at this time- attempted to manage with diet.    Right shoulder pain       Past Surgical History:   Procedure Laterality Date    KNEE ARTHROSCOPY Right     OTHER SURGICAL HISTORY      eye surg as a child    SHOULDER ARTHROSCOPY Right      Family History   Problem Relation Age of Onset    Anemia Mother     Hypertension Father     Parkinson's Disease Father       Social History     Tobacco Use    Smoking status: Former     Current packs/day: 0.00     Types: Cigarettes     Quit date: 1999     Years since quittin.3    Smokeless tobacco: Never   Substance Use Topics    Alcohol use: Yes     Alcohol/week: 6.0 standard drinks of alcohol     Types: 6 Cans of beer per week       Prior to Admission medications    Medication Sig Start Date End Date Taking? Authorizing Provider   oxyCODONE (ROXICODONE) 5 MG immediate release tablet Take 1-2 tablets by mouth every 4 hours as needed for Pain for up to 7 days. Max Daily Amount: 60 mg 25  David Coleman MD   methocarbamol (ROBAXIN-750) 750 MG tablet Take 1 tablet by

## 2025-04-22 ENCOUNTER — ANESTHESIA (OUTPATIENT)
Dept: SURGERY | Age: 55
End: 2025-04-22
Payer: COMMERCIAL

## 2025-04-22 ENCOUNTER — HOSPITAL ENCOUNTER (OUTPATIENT)
Age: 55
Discharge: HOME HEALTH | End: 2025-04-23
Attending: ORTHOPAEDIC SURGERY | Admitting: ORTHOPAEDIC SURGERY
Payer: COMMERCIAL

## 2025-04-22 ENCOUNTER — TELEPHONE (OUTPATIENT)
Dept: ORTHOPEDIC SURGERY | Age: 55
End: 2025-04-22

## 2025-04-22 DIAGNOSIS — Z76.89 ENCOUNTER TO ESTABLISH CARE: Primary | ICD-10-CM

## 2025-04-22 DIAGNOSIS — Z09 NEED FOR FOLLOW-UP BY HOME HEALTH SERVICE: ICD-10-CM

## 2025-04-22 PROBLEM — M17.11 ARTHRITIS OF RIGHT KNEE: Status: ACTIVE | Noted: 2025-04-22

## 2025-04-22 PROCEDURE — 94761 N-INVAS EAR/PLS OXIMETRY MLT: CPT

## 2025-04-22 PROCEDURE — 7100000001 HC PACU RECOVERY - ADDTL 15 MIN: Performed by: ORTHOPAEDIC SURGERY

## 2025-04-22 PROCEDURE — 94760 N-INVAS EAR/PLS OXIMETRY 1: CPT

## 2025-04-22 PROCEDURE — 6370000000 HC RX 637 (ALT 250 FOR IP): Performed by: STUDENT IN AN ORGANIZED HEALTH CARE EDUCATION/TRAINING PROGRAM

## 2025-04-22 PROCEDURE — 6360000002 HC RX W HCPCS: Performed by: PHYSICIAN ASSISTANT

## 2025-04-22 PROCEDURE — 97530 THERAPEUTIC ACTIVITIES: CPT

## 2025-04-22 PROCEDURE — 2580000003 HC RX 258: Performed by: STUDENT IN AN ORGANIZED HEALTH CARE EDUCATION/TRAINING PROGRAM

## 2025-04-22 PROCEDURE — 6360000002 HC RX W HCPCS: Performed by: STUDENT IN AN ORGANIZED HEALTH CARE EDUCATION/TRAINING PROGRAM

## 2025-04-22 PROCEDURE — 27447 TOTAL KNEE ARTHROPLASTY: CPT | Performed by: PHYSICIAN ASSISTANT

## 2025-04-22 PROCEDURE — 6360000002 HC RX W HCPCS: Performed by: ORTHOPAEDIC SURGERY

## 2025-04-22 PROCEDURE — 7100000000 HC PACU RECOVERY - FIRST 15 MIN: Performed by: ORTHOPAEDIC SURGERY

## 2025-04-22 PROCEDURE — 64447 NJX AA&/STRD FEMORAL NRV IMG: CPT | Performed by: STUDENT IN AN ORGANIZED HEALTH CARE EDUCATION/TRAINING PROGRAM

## 2025-04-22 PROCEDURE — C1776 JOINT DEVICE (IMPLANTABLE): HCPCS | Performed by: ORTHOPAEDIC SURGERY

## 2025-04-22 PROCEDURE — 2580000003 HC RX 258: Performed by: PHYSICIAN ASSISTANT

## 2025-04-22 PROCEDURE — 2500000003 HC RX 250 WO HCPCS: Performed by: NURSE ANESTHETIST, CERTIFIED REGISTERED

## 2025-04-22 PROCEDURE — 2580000003 HC RX 258: Performed by: ORTHOPAEDIC SURGERY

## 2025-04-22 PROCEDURE — C1713 ANCHOR/SCREW BN/BN,TIS/BN: HCPCS | Performed by: ORTHOPAEDIC SURGERY

## 2025-04-22 PROCEDURE — 6370000000 HC RX 637 (ALT 250 FOR IP): Performed by: PHYSICIAN ASSISTANT

## 2025-04-22 PROCEDURE — 2720000010 HC SURG SUPPLY STERILE: Performed by: ORTHOPAEDIC SURGERY

## 2025-04-22 PROCEDURE — 20985 CPTR-ASST DIR MS PX: CPT | Performed by: ORTHOPAEDIC SURGERY

## 2025-04-22 PROCEDURE — 97110 THERAPEUTIC EXERCISES: CPT

## 2025-04-22 PROCEDURE — 3700000000 HC ANESTHESIA ATTENDED CARE: Performed by: ORTHOPAEDIC SURGERY

## 2025-04-22 PROCEDURE — 3700000001 HC ADD 15 MINUTES (ANESTHESIA): Performed by: ORTHOPAEDIC SURGERY

## 2025-04-22 PROCEDURE — 3600000005 HC SURGERY LEVEL 5 BASE: Performed by: ORTHOPAEDIC SURGERY

## 2025-04-22 PROCEDURE — 2500000003 HC RX 250 WO HCPCS: Performed by: ORTHOPAEDIC SURGERY

## 2025-04-22 PROCEDURE — 97161 PT EVAL LOW COMPLEX 20 MIN: CPT

## 2025-04-22 PROCEDURE — 97165 OT EVAL LOW COMPLEX 30 MIN: CPT

## 2025-04-22 PROCEDURE — 27447 TOTAL KNEE ARTHROPLASTY: CPT | Performed by: ORTHOPAEDIC SURGERY

## 2025-04-22 PROCEDURE — 6360000002 HC RX W HCPCS: Performed by: NURSE ANESTHETIST, CERTIFIED REGISTERED

## 2025-04-22 PROCEDURE — 3600000015 HC SURGERY LEVEL 5 ADDTL 15MIN: Performed by: ORTHOPAEDIC SURGERY

## 2025-04-22 PROCEDURE — 2709999900 HC NON-CHARGEABLE SUPPLY: Performed by: ORTHOPAEDIC SURGERY

## 2025-04-22 PROCEDURE — 2500000003 HC RX 250 WO HCPCS: Performed by: PHYSICIAN ASSISTANT

## 2025-04-22 DEVICE — ATTUNE PATELLA MEDIALIZED ANATOMIC 38MM CEMENTED AOX
Type: IMPLANTABLE DEVICE | Site: KNEE | Status: FUNCTIONAL
Brand: ATTUNE

## 2025-04-22 DEVICE — ATTUNE KNEE SYSTEM TIBIAL INSERT FIXED BEARING MEDIAL STABILIZED RIGHT AOX 8, 6MM
Type: IMPLANTABLE DEVICE | Site: KNEE | Status: FUNCTIONAL
Brand: ATTUNE

## 2025-04-22 DEVICE — DEPUY CMW 2 FAST SET BONE CEMENT 20G: Type: IMPLANTABLE DEVICE | Site: KNEE | Status: FUNCTIONAL

## 2025-04-22 DEVICE — ATTUNE KNEE SYSTEM FEMORAL POROCOAT CRUCIATE RETAINING SIZE 8 RIGHT CEMENTLESS
Type: IMPLANTABLE DEVICE | Site: KNEE | Status: FUNCTIONAL
Brand: ATTUNE

## 2025-04-22 DEVICE — ATTUNE KNEE SYSTEM TIBIAL BASE AFFIXIUM FIXED BEARING SIZE 6
Type: IMPLANTABLE DEVICE | Site: KNEE | Status: FUNCTIONAL
Brand: ATTUNE AFFIXIUM

## 2025-04-22 DEVICE — KNEE K2 TOT HEMI ADV CMTLS IMPL CAPPED SYNTHES: Type: IMPLANTABLE DEVICE | Status: FUNCTIONAL

## 2025-04-22 RX ORDER — SODIUM CHLORIDE 9 MG/ML
INJECTION, SOLUTION INTRAVENOUS PRN
Status: DISCONTINUED | OUTPATIENT
Start: 2025-04-22 | End: 2025-04-23 | Stop reason: HOSPADM

## 2025-04-22 RX ORDER — SODIUM CHLORIDE 9 MG/ML
INJECTION, SOLUTION INTRAVENOUS CONTINUOUS
Status: DISCONTINUED | OUTPATIENT
Start: 2025-04-22 | End: 2025-04-23 | Stop reason: HOSPADM

## 2025-04-22 RX ORDER — PROPOFOL 10 MG/ML
INJECTION, EMULSION INTRAVENOUS
Status: DISCONTINUED | OUTPATIENT
Start: 2025-04-22 | End: 2025-04-22 | Stop reason: SDUPTHER

## 2025-04-22 RX ORDER — FENTANYL CITRATE 50 UG/ML
100 INJECTION, SOLUTION INTRAMUSCULAR; INTRAVENOUS
Status: COMPLETED | OUTPATIENT
Start: 2025-04-22 | End: 2025-04-22

## 2025-04-22 RX ORDER — OXYCODONE HYDROCHLORIDE 5 MG/1
5 TABLET ORAL
Status: COMPLETED | OUTPATIENT
Start: 2025-04-22 | End: 2025-04-22

## 2025-04-22 RX ORDER — FAMOTIDINE 20 MG/1
20 TABLET, FILM COATED ORAL 2 TIMES DAILY
Status: DISCONTINUED | OUTPATIENT
Start: 2025-04-23 | End: 2025-04-23 | Stop reason: HOSPADM

## 2025-04-22 RX ORDER — OXYCODONE HYDROCHLORIDE 5 MG/1
5 TABLET ORAL EVERY 4 HOURS PRN
Status: DISCONTINUED | OUTPATIENT
Start: 2025-04-22 | End: 2025-04-23 | Stop reason: HOSPADM

## 2025-04-22 RX ORDER — ASPIRIN 81 MG/1
81 TABLET ORAL 2 TIMES DAILY
Status: DISCONTINUED | OUTPATIENT
Start: 2025-04-22 | End: 2025-04-23 | Stop reason: HOSPADM

## 2025-04-22 RX ORDER — OXYCODONE HYDROCHLORIDE 5 MG/1
10 TABLET ORAL EVERY 4 HOURS PRN
Status: DISCONTINUED | OUTPATIENT
Start: 2025-04-22 | End: 2025-04-23 | Stop reason: HOSPADM

## 2025-04-22 RX ORDER — CETIRIZINE HYDROCHLORIDE 5 MG/1
5 TABLET ORAL DAILY
Status: DISCONTINUED | OUTPATIENT
Start: 2025-04-23 | End: 2025-04-23 | Stop reason: HOSPADM

## 2025-04-22 RX ORDER — DEXAMETHASONE SODIUM PHOSPHATE 10 MG/ML
10 INJECTION, SOLUTION INTRA-ARTICULAR; INTRALESIONAL; INTRAMUSCULAR; INTRAVENOUS; SOFT TISSUE EVERY 6 HOURS
Status: DISCONTINUED | OUTPATIENT
Start: 2025-04-23 | End: 2025-04-23 | Stop reason: HOSPADM

## 2025-04-22 RX ORDER — ACETAMINOPHEN 500 MG
1000 TABLET ORAL ONCE
Status: COMPLETED | OUTPATIENT
Start: 2025-04-22 | End: 2025-04-22

## 2025-04-22 RX ORDER — ONDANSETRON 2 MG/ML
4 INJECTION INTRAMUSCULAR; INTRAVENOUS EVERY 6 HOURS PRN
Status: DISCONTINUED | OUTPATIENT
Start: 2025-04-22 | End: 2025-04-23 | Stop reason: HOSPADM

## 2025-04-22 RX ORDER — TRIAMCINOLONE ACETONIDE 55 UG/1
1 SPRAY, METERED NASAL DAILY
Status: DISCONTINUED | OUTPATIENT
Start: 2025-04-22 | End: 2025-04-22

## 2025-04-22 RX ORDER — HYDROMORPHONE HYDROCHLORIDE 1 MG/ML
1 INJECTION, SOLUTION INTRAMUSCULAR; INTRAVENOUS; SUBCUTANEOUS
Status: DISCONTINUED | OUTPATIENT
Start: 2025-04-22 | End: 2025-04-23 | Stop reason: HOSPADM

## 2025-04-22 RX ORDER — NALOXONE HYDROCHLORIDE 0.4 MG/ML
0.4 INJECTION, SOLUTION INTRAMUSCULAR; INTRAVENOUS; SUBCUTANEOUS PRN
Status: DISCONTINUED | OUTPATIENT
Start: 2025-04-22 | End: 2025-04-23 | Stop reason: HOSPADM

## 2025-04-22 RX ORDER — MIDAZOLAM HYDROCHLORIDE 2 MG/2ML
2 INJECTION, SOLUTION INTRAMUSCULAR; INTRAVENOUS
Status: COMPLETED | OUTPATIENT
Start: 2025-04-22 | End: 2025-04-22

## 2025-04-22 RX ORDER — KETOROLAC TROMETHAMINE 30 MG/ML
INJECTION, SOLUTION INTRAMUSCULAR; INTRAVENOUS PRN
Status: DISCONTINUED | OUTPATIENT
Start: 2025-04-22 | End: 2025-04-22 | Stop reason: ALTCHOICE

## 2025-04-22 RX ORDER — SODIUM CHLORIDE 0.9 % (FLUSH) 0.9 %
5-40 SYRINGE (ML) INJECTION EVERY 12 HOURS SCHEDULED
Status: DISCONTINUED | OUTPATIENT
Start: 2025-04-22 | End: 2025-04-23 | Stop reason: HOSPADM

## 2025-04-22 RX ORDER — NALOXONE HYDROCHLORIDE 0.4 MG/ML
INJECTION, SOLUTION INTRAMUSCULAR; INTRAVENOUS; SUBCUTANEOUS PRN
Status: DISCONTINUED | OUTPATIENT
Start: 2025-04-22 | End: 2025-04-22 | Stop reason: HOSPADM

## 2025-04-22 RX ORDER — CELECOXIB 200 MG/1
200 CAPSULE ORAL DAILY
Status: DISCONTINUED | OUTPATIENT
Start: 2025-04-23 | End: 2025-04-23 | Stop reason: HOSPADM

## 2025-04-22 RX ORDER — PROCHLORPERAZINE EDISYLATE 5 MG/ML
5 INJECTION INTRAMUSCULAR; INTRAVENOUS
Status: DISCONTINUED | OUTPATIENT
Start: 2025-04-22 | End: 2025-04-22 | Stop reason: HOSPADM

## 2025-04-22 RX ORDER — ACETAMINOPHEN 325 MG/1
650 TABLET ORAL EVERY 6 HOURS
Status: DISCONTINUED | OUTPATIENT
Start: 2025-04-22 | End: 2025-04-23 | Stop reason: HOSPADM

## 2025-04-22 RX ORDER — ONDANSETRON 2 MG/ML
4 INJECTION INTRAMUSCULAR; INTRAVENOUS
Status: DISCONTINUED | OUTPATIENT
Start: 2025-04-22 | End: 2025-04-22 | Stop reason: HOSPADM

## 2025-04-22 RX ORDER — METHOCARBAMOL 750 MG/1
750 TABLET, FILM COATED ORAL 4 TIMES DAILY PRN
Status: DISCONTINUED | OUTPATIENT
Start: 2025-04-22 | End: 2025-04-23 | Stop reason: HOSPADM

## 2025-04-22 RX ORDER — SENNA AND DOCUSATE SODIUM 50; 8.6 MG/1; MG/1
1 TABLET, FILM COATED ORAL 2 TIMES DAILY
Status: DISCONTINUED | OUTPATIENT
Start: 2025-04-22 | End: 2025-04-23 | Stop reason: HOSPADM

## 2025-04-22 RX ORDER — DEXAMETHASONE SODIUM PHOSPHATE 4 MG/ML
INJECTION, SOLUTION INTRA-ARTICULAR; INTRALESIONAL; INTRAMUSCULAR; INTRAVENOUS; SOFT TISSUE
Status: COMPLETED | OUTPATIENT
Start: 2025-04-22 | End: 2025-04-22

## 2025-04-22 RX ORDER — DIPHENHYDRAMINE HYDROCHLORIDE 50 MG/ML
25 INJECTION, SOLUTION INTRAMUSCULAR; INTRAVENOUS EVERY 6 HOURS PRN
Status: DISCONTINUED | OUTPATIENT
Start: 2025-04-22 | End: 2025-04-23 | Stop reason: HOSPADM

## 2025-04-22 RX ORDER — PROMETHAZINE HYDROCHLORIDE 25 MG/1
25 TABLET ORAL EVERY 6 HOURS PRN
Status: DISCONTINUED | OUTPATIENT
Start: 2025-04-22 | End: 2025-04-23 | Stop reason: HOSPADM

## 2025-04-22 RX ORDER — SODIUM CHLORIDE 0.9 % (FLUSH) 0.9 %
5-40 SYRINGE (ML) INJECTION PRN
Status: DISCONTINUED | OUTPATIENT
Start: 2025-04-22 | End: 2025-04-23 | Stop reason: HOSPADM

## 2025-04-22 RX ORDER — HYDROMORPHONE HYDROCHLORIDE 1 MG/ML
0.5 INJECTION, SOLUTION INTRAMUSCULAR; INTRAVENOUS; SUBCUTANEOUS
Status: DISCONTINUED | OUTPATIENT
Start: 2025-04-22 | End: 2025-04-23 | Stop reason: HOSPADM

## 2025-04-22 RX ORDER — ROPIVACAINE HYDROCHLORIDE 2 MG/ML
INJECTION, SOLUTION EPIDURAL; INFILTRATION; PERINEURAL PRN
Status: DISCONTINUED | OUTPATIENT
Start: 2025-04-22 | End: 2025-04-22 | Stop reason: ALTCHOICE

## 2025-04-22 RX ORDER — PSEUDOEPHEDRINE HYDROCHLORIDE 60 MG/1
60 TABLET, FILM COATED ORAL EVERY 6 HOURS PRN
Status: DISCONTINUED | OUTPATIENT
Start: 2025-04-22 | End: 2025-04-23 | Stop reason: HOSPADM

## 2025-04-22 RX ORDER — DIPHENHYDRAMINE HCL 25 MG
25 CAPSULE ORAL EVERY 6 HOURS PRN
Status: DISCONTINUED | OUTPATIENT
Start: 2025-04-22 | End: 2025-04-23 | Stop reason: HOSPADM

## 2025-04-22 RX ORDER — SODIUM CHLORIDE, SODIUM LACTATE, POTASSIUM CHLORIDE, CALCIUM CHLORIDE 600; 310; 30; 20 MG/100ML; MG/100ML; MG/100ML; MG/100ML
INJECTION, SOLUTION INTRAVENOUS CONTINUOUS
Status: DISCONTINUED | OUTPATIENT
Start: 2025-04-22 | End: 2025-04-22 | Stop reason: HOSPADM

## 2025-04-22 RX ORDER — MAGNESIUM HYDROXIDE/ALUMINUM HYDROXICE/SIMETHICONE 120; 1200; 1200 MG/30ML; MG/30ML; MG/30ML
15 SUSPENSION ORAL EVERY 6 HOURS PRN
Status: DISCONTINUED | OUTPATIENT
Start: 2025-04-22 | End: 2025-04-23 | Stop reason: HOSPADM

## 2025-04-22 RX ORDER — LIDOCAINE HYDROCHLORIDE 20 MG/ML
INJECTION, SOLUTION EPIDURAL; INFILTRATION; INTRACAUDAL; PERINEURAL
Status: DISCONTINUED | OUTPATIENT
Start: 2025-04-22 | End: 2025-04-22 | Stop reason: SDUPTHER

## 2025-04-22 RX ORDER — LIDOCAINE HYDROCHLORIDE 10 MG/ML
1 INJECTION, SOLUTION INFILTRATION; PERINEURAL
Status: COMPLETED | OUTPATIENT
Start: 2025-04-22 | End: 2025-04-22

## 2025-04-22 RX ADMIN — LIDOCAINE HYDROCHLORIDE 25 MG: 20 INJECTION, SOLUTION EPIDURAL; INFILTRATION; INTRACAUDAL; PERINEURAL at 08:08

## 2025-04-22 RX ADMIN — MEPIVACAINE HYDROCHLORIDE 60 MG: 20 INJECTION, SOLUTION EPIDURAL; INFILTRATION at 08:00

## 2025-04-22 RX ADMIN — FAMOTIDINE 20 MG: 10 INJECTION, SOLUTION INTRAVENOUS at 07:19

## 2025-04-22 RX ADMIN — ACETAMINOPHEN 650 MG: 325 TABLET, FILM COATED ORAL at 11:18

## 2025-04-22 RX ADMIN — OXYCODONE 5 MG: 5 TABLET ORAL at 10:50

## 2025-04-22 RX ADMIN — PHENYLEPHRINE HYDROCHLORIDE 50 MCG: 0.1 INJECTION, SOLUTION INTRAVENOUS at 09:36

## 2025-04-22 RX ADMIN — OXYCODONE 5 MG: 5 TABLET ORAL at 11:18

## 2025-04-22 RX ADMIN — SENNOSIDES, DOCUSATE SODIUM 1 TABLET: 50; 8.6 TABLET, FILM COATED ORAL at 20:15

## 2025-04-22 RX ADMIN — ASPIRIN 81 MG: 81 TABLET, COATED ORAL at 20:15

## 2025-04-22 RX ADMIN — PROPOFOL 70 MG: 10 INJECTION, EMULSION INTRAVENOUS at 08:08

## 2025-04-22 RX ADMIN — MIDAZOLAM HYDROCHLORIDE 2 MG: 1 INJECTION, SOLUTION INTRAMUSCULAR; INTRAVENOUS at 07:26

## 2025-04-22 RX ADMIN — SODIUM CHLORIDE, PRESERVATIVE FREE 10 ML: 5 INJECTION INTRAVENOUS at 20:16

## 2025-04-22 RX ADMIN — SODIUM CHLORIDE, SODIUM LACTATE, POTASSIUM CHLORIDE, AND CALCIUM CHLORIDE: 600; 310; 30; 20 INJECTION, SOLUTION INTRAVENOUS at 06:45

## 2025-04-22 RX ADMIN — ACETAMINOPHEN 1000 MG: 500 TABLET, FILM COATED ORAL at 06:45

## 2025-04-22 RX ADMIN — CEFAZOLIN 2000 MG: 10 INJECTION, POWDER, FOR SOLUTION INTRAVENOUS at 17:02

## 2025-04-22 RX ADMIN — SODIUM CHLORIDE, SODIUM LACTATE, POTASSIUM CHLORIDE, AND CALCIUM CHLORIDE: 600; 310; 30; 20 INJECTION, SOLUTION INTRAVENOUS at 08:46

## 2025-04-22 RX ADMIN — DEXAMETHASONE SODIUM PHOSPHATE 4 MG: 4 INJECTION INTRA-ARTICULAR; INTRALESIONAL; INTRAMUSCULAR; INTRAVENOUS; SOFT TISSUE at 07:26

## 2025-04-22 RX ADMIN — Medication 2000 MG: at 08:13

## 2025-04-22 RX ADMIN — ACETAMINOPHEN 650 MG: 325 TABLET, FILM COATED ORAL at 17:52

## 2025-04-22 RX ADMIN — ROPIVACAINE HYDROCHLORIDE 20 ML: 2 INJECTION, SOLUTION EPIDURAL; INFILTRATION at 07:26

## 2025-04-22 RX ADMIN — CEFAZOLIN 2000 MG: 10 INJECTION, POWDER, FOR SOLUTION INTRAVENOUS at 23:39

## 2025-04-22 RX ADMIN — PROMETHAZINE HYDROCHLORIDE 25 MG: 25 TABLET ORAL at 12:05

## 2025-04-22 RX ADMIN — Medication 1000 MG: at 08:14

## 2025-04-22 RX ADMIN — ACETAMINOPHEN 650 MG: 325 TABLET, FILM COATED ORAL at 23:39

## 2025-04-22 RX ADMIN — SODIUM CHLORIDE, SODIUM LACTATE, POTASSIUM CHLORIDE, AND CALCIUM CHLORIDE: 600; 310; 30; 20 INJECTION, SOLUTION INTRAVENOUS at 07:50

## 2025-04-22 RX ADMIN — SODIUM CHLORIDE: 0.9 INJECTION, SOLUTION INTRAVENOUS at 17:12

## 2025-04-22 RX ADMIN — LIDOCAINE HYDROCHLORIDE 1 ML: 10 INJECTION, SOLUTION INFILTRATION; PERINEURAL at 06:44

## 2025-04-22 RX ADMIN — FENTANYL CITRATE 100 MCG: 50 INJECTION INTRAMUSCULAR; INTRAVENOUS at 07:26

## 2025-04-22 RX ADMIN — PROPOFOL 120 MCG/KG/MIN: 10 INJECTION, EMULSION INTRAVENOUS at 08:09

## 2025-04-22 ASSESSMENT — PAIN SCALES - GENERAL
PAINLEVEL_OUTOF10: 2
PAINLEVEL_OUTOF10: 2
PAINLEVEL_OUTOF10: 3
PAINLEVEL_OUTOF10: 5

## 2025-04-22 ASSESSMENT — KOOS JR
TWISING OR PIVOTING ON KNEE: MODERATE
STRAIGHTENING KNEE FULLY: MODERATE
KOOS JR TOTAL INTERVAL SCORE: 50.012
RISING FROM SITTING: MILD
GOING UP OR DOWN STAIRS: EXTREME
BENDING TO THE FLOOR TO PICK UP OBJECT: EXTREME
STANDING UPRIGHT: MILD
HOW SEVERE IS YOUR KNEE STIFFNESS AFTER FIRST WAKING IN MORNING: MILD

## 2025-04-22 ASSESSMENT — PAIN DESCRIPTION - ORIENTATION: ORIENTATION: RIGHT

## 2025-04-22 ASSESSMENT — PAIN DESCRIPTION - LOCATION: LOCATION: KNEE

## 2025-04-22 ASSESSMENT — PAIN DESCRIPTION - DESCRIPTORS: DESCRIPTORS: ACHING

## 2025-04-22 ASSESSMENT — PAIN - FUNCTIONAL ASSESSMENT: PAIN_FUNCTIONAL_ASSESSMENT: 0-10

## 2025-04-22 NOTE — ANESTHESIA PROCEDURE NOTES
Peripheral Block    Patient location during procedure: pre-op  Reason for block: post-op pain management and at surgeon's request  Start time: 4/22/2025 7:26 AM  End time: 4/22/2025 7:27 AM  Staffing  Performed: anesthesiologist   Anesthesiologist: David Mills MD  Performed by: David Mills MD  Authorized by: David Mills MD    Preanesthetic Checklist  Completed: patient identified, IV checked, site marked, risks and benefits discussed, surgical/procedural consents, equipment checked, pre-op evaluation, timeout performed, anesthesia consent given, oxygen available and monitors applied/VS acknowledged  Peripheral Block   Patient position: supine  Prep: ChloraPrep  Provider prep: mask and sterile gloves  Patient monitoring: cardiac monitor, continuous pulse ox, frequent blood pressure checks, IV access, oxygen and responsive to questions  Block type: Femoral  Adductor canal  Laterality: right  Injection technique: single-shot  Guidance: ultrasound guided  Local infiltration: lidocaine  Infiltration strength: 1 %  Local infiltration: lidocaine  Dose: 3 mL    Needle   Needle type: insulated echogenic nerve stimulator needle   Needle gauge: 20 G  Needle localization: ultrasound guidance  Needle length: 10 cm  Assessment   Injection assessment: negative aspiration for heme, no paresthesia on injection, no intravascular symptoms and local visualized surrounding nerve on ultrasound  Paresthesia pain: none  Slow fractionated injection: yes  Hemodynamics: stable  Outcomes: patient tolerated procedure well and uncomplicated    Additional Notes  3 cc 1% lidocaine local at needle insertion site.  Risks discussed including damage to muscle or nerve.  Needle inserted and placed in close proximity to the nerve under real time ultrasound guidance.  Ultrasound was used to visualize the spread of local anesthetic in close proximity to the nerve being blocked.  All structures appeared anatomically normal and there were no

## 2025-04-22 NOTE — TELEPHONE ENCOUNTER
Norma from [facility] is calling to ask if Dr. Coleman is going to be following the patient for home health care.  She asks for a return call please.

## 2025-04-22 NOTE — ANESTHESIA PRE PROCEDURE
Department of Anesthesiology  Preprocedure Note       Name:  Huang Lal   Age:  55 y.o.  :  1970                                          MRN:  [id number]         Date:  2025      Surgeon: Surgeon(s):  David Coleman MD    Procedure: Procedure(s):  KNEE TOTAL ARTHROPLASTY ROBOTIC VELYS-RIGHT SDD    Medications prior to admission:   Prior to Admission medications    Medication Sig Start Date End Date Taking? Authorizing Provider   oxyCODONE (ROXICODONE) 5 MG immediate release tablet Take 1-2 tablets by mouth every 4 hours as needed for Pain for up to 7 days. Max Daily Amount: 60 mg  Patient not taking: Reported on 2025  David Coleman MD   methocarbamol (ROBAXIN-750) 750 MG tablet Take 1 tablet by mouth every 6 hours as needed for spasms.  Patient not taking: Reported on 2025   David Coleman MD   ondansetron (ZOFRAN) 4 MG tablet Take 1 tablet by mouth every 6 hours as needed for Nausea or Vomiting  Patient not taking: Reported on 2025   David Coleman MD   aspirin 81 MG EC tablet Take 1 tablet by mouth 2 times daily  Patient not taking: Reported on 2025   David Coleman MD   celecoxib (CELEBREX) 200 MG capsule Take 1 capsule by mouth daily  Patient not taking: Reported on 2025   David Coleman MD   NONFORMULARY Take by mouth every evening NAD+ (Liposomal) Supplement    ProviderAllie MD   MAGNESIUM GLYCINATE PO Take 300 mg by mouth every evening  Patient not taking: Reported on 2025    Allie Gaytan MD   cetirizine-psuedoephedrine (ZYRTEC-D ALLERGY & SINUS) 5-120 MG per extended release tablet Take 1 tablet by mouth 2 times daily    Allie Gaytan MD   Triamcinolone Acetonide (NASACORT ALLERGY 24HR NA) by Nasal route Daily    Allie Gaytan MD   meloxicam (MOBIC) 15 MG tablet Take 1 tablet by mouth daily  Patient not taking: Reported on 2025

## 2025-04-22 NOTE — PROGRESS NOTES
OCCUPATIONAL THERAPY Initial Assessment, Daily Note, and PM      (Link to Caseload Tracking: OT Visit Days: 1  OT Orders   Time  OT Charge Capture  Rehab Caseload Tracker  Episode     Huang Lal is a 55 y.o. male   PRIMARY DIAGNOSIS: Osteoarthritis of right knee  Osteoarthritis of right knee [M17.11]  Arthritis of right knee [M17.11]  Procedure(s) (LRB):  KNEE TOTAL ARTHROPLASTY ROBOTIC VELYS-RIGHT SDD (Right)  * Day of Surgery *  Reason for Referral: Pain in Right Knee (M25.561)  Stiffness of Right Knee, Not elsewhere classified (M25.661)  Outpatient in a bed: [place] / [place] / Product Type: *No Product type* /     ASSESSMENT:     REHAB RECOMMENDATIONS:   Recommendation to date pending progress:  Setting:  No further skilled occupational therapy after discharge from hospital    Equipment:    None     ASSESSMENT:  Mr. Jose Lal is s/p right TKA and presents with decreased independence with functional mobility and activities of daily living as compared to baseline level of function and safety. Patient would benefit from skilled Occupational Therapy to maximize independence and safety with self-care task and functional mobility.   Pt completed bed mobility with stand by assist. Once sitting on EOB pt began feeling nauseous and lightheaded. Pt able to lay self back down, BP taken supine 84/60 HR 43 bpm. Alerted RN. RN at bedside, BP taken again in trendelenburg 93/63, HR 43-48 bpm .  BP up to 100/72 after a few minutes. Pt tolerated semi supine in bed, dinner tray set up in front of pt and feeling better, RN returning. OT will see pt tomorrow AM for ADL session.       [facility] AM-PAC™ “6 Clicks” Daily Activity Inpatient Short Form:     AM-PAC Daily Activity - Inpatient   How much help is needed for putting on and taking off regular lower body clothing?: A Little  How much help is needed for bathing (which includes washing, rinsing, drying)?: A Little  How much help is

## 2025-04-22 NOTE — PROGRESS NOTES
ACUTE PHYSICAL THERAPY GOALS:   (Developed with and agreed upon by patient and/or caregiver.)  GOALS (1-4 days):  (1.)Mr. Jose Lal will move from supine to sit and sit to supine  in bed with SUPERVISION.  (2.)Mr. Jose Lal will transfer from bed to chair and chair to bed with SUPERVISION using the least restrictive device.  (3.)Mr. Jose Lal will ambulate with SUPERVISION for 300 feet with the least restrictive device.  (4.)Mr. Jose Lal will ambulate up/down 4 steps with rails with CONTACT GUARD ASSIST.  (5.)Mr. Jose Lal will increase right knee ROM to 0-90°.  ________________________________________________________________________________________________      PHYSICAL THERAPY: TOTAL KNEE ARTHROPLASTY Initial Assessment and PM  (Link to Caseload Tracking: PT Visit Days : 1  Acknowledge Orders  Time In/Out  PT Charge Capture  Rehab Caseload Tracker  Episode   Huang Lal is a 55 y.o. male   PRIMARY DIAGNOSIS: Osteoarthritis of right knee  Osteoarthritis of right knee [M17.11]  Arthritis of right knee [M17.11]  Procedure(s) (LRB):  KNEE TOTAL ARTHROPLASTY ROBOTIC VELYS-RIGHT SDD (Right)  * Day of Surgery *  Reason for Referral: Pain in Right Knee (M25.561)  Stiffness of Right Knee, Not elsewhere classified (M25.661)  Difficulty in walking, Not elsewhere classified (R26.2)  Outpatient in a bed: [place] / [place] / Product Type: *No Product type* /     REHAB RECOMMENDATIONS:   Recommendation to date pending progress:  Setting:  Home Health Therapy    Equipment:    Rolling Walker     RANGE OF MOTION:   Right Knee Flexion: R Knee Flexion (0-145): 60 (approximate)  Right Knee Extension: R Knee Extension (0): 10 (approximate)     GAIT: I Mod I S SBA CGA Min Mod Max Total  NT x2 Comments:   Level of Assistance [] [] [] [] [x] [] [] [] [] [] []            Weightbearing Status  Full weight bearing     Distance  150 feet    Gait Quality Antalgic, Decreased carolyn

## 2025-04-22 NOTE — CARE COORDINATION
Met with  and his wife, Emely, at bedside for initial CM assessment. Patient is alert and oriented x4. Demographics verified. Patient does not have a PCP but is agreeable for a referral to be sent for establishment.  lives with his wife and 3 sons in a two story home with 4 steps to enter and 13 to gain access to the second floor. He has a cane for use at home if needed but was independent with mobility and ADLs at most recent baseline and used no services PTA. Patient works full-time and remains an active  in the community. Insured through [employer].  plans to return home with his family once medically cleared. A list of  agencies along with their quality metrics was provided to patient and spouse for review. Spouse is requesting a referral be sent to [facility] for  PT services. Referral will be sent accordingly. CM will continue to follow and assist with any further needs that may arise.       04/22/25 1055   Service Assessment   Patient Orientation Alert and Oriented;Person;Place;Situation   Cognition Alert   History Provided By Patient   Primary Caregiver Self   Accompanied By/Relationship Spouse-Emely   Support Systems Spouse/Significant Other;Children;Family Members;Friends/Neighbors   Patient's Healthcare Decision Maker is: Legal Next of Kin   PCP Verified by CM No  (Does not have one-agreeable to a referral for establishment)   Prior Functional Level Independent in ADLs/IADLs   Current Functional Level Other (see comment)  (pending therapy)   Can patient return to prior living arrangement Yes   Ability to make needs known: Good   Family able to assist with home care needs: Yes   Would you like for me to discuss the discharge plan with any other family members/significant others, and if so, who? Yes  (Spouse-Emely)   Financial Resources None;Other (Comment)  (BCBS)   Community Resources None   CM/SW Referral DME;Other (see comment)  (standard assessment)   Social/Functional

## 2025-04-22 NOTE — PROGRESS NOTES
Spiritual Health History and Assessment/Pre-op Progress Note  [facility]      Room # MOR/PL    Name: Huang Lal           Age: 55 y.o.    Gender: male          MRN: [id number]  Sikh: Jainism       Preferred Language: English      Date: 04/22/25  Visit Time: Begin Time: 0700 End Time : 0715 Complexity: Complexity of Encounter: Low      Visit Summary: Referral received for pre-op surgery prayer.  Prayer and support given to patient and wife.  No further needs identified.  Will continue to follow as needed.    Referral/Consult From: Referral/Consult From: Nurse, Patient  Encounter Overview/Reason: Encounter Overview/Reason: Initial Encounter, Pre-Op  Encounter Code:     Crisis (if applicable):    Service Provided For: Service Provided For: Patient and family together     Patient was available.    Quyn[place], Belief, Meaning:   Patient identifies as spiritual  Family/Friends identifies as spiritual  Rituals      Importance and Influence:  Patient has spiritual/personal beliefs that influence decisions regarding their health  Family/Friends has spiritual/personal beliefs that influence decisions regarding the patient's health    Community:  Patient Supportive spouse,    Family/Friends       Assessment and Plan of Care:   Emotions Expressed by Patient:        Interventions by :   Intervention: Prayer (assurance of)/[place]     Result/ Response by Patient:        Patient Plan of Care:         Emotions Expressed by Spouse/Family/Friends:   gratitude     Interventions with Spouse/ Family/Friends include:   prayer    Spouse/Family/Friends Plan of Care:   No spiritual needs identified for follow-up.      Electronically signed by Chaplain Sukhdev, on 4/22/2025 at 7:19 AM.  Spiritual Health  [facility] [place]  [phone]

## 2025-04-22 NOTE — CARE COORDINATION
CM received call from [facility] regarding referral that was sent for HH PT services. Agency is unable to accept patient due to inadequate staffing at this time. Referral sent to patient/spouse's second choice, [facility] for HH PT.

## 2025-04-22 NOTE — INTERVAL H&P NOTE
Update History & Physical    The patient's History and Physical of April 21, 25 was reviewed with the patient and I examined the patient. There was no change. The surgical site was confirmed by the patient and me.     Plan: The risks, benefits, expected outcome, and alternative to the recommended procedure have been discussed with the patient. Patient understands and wants to proceed with the procedure.     Electronically signed by BALTA Young on 4/22/2025 at 7:04 AM

## 2025-04-22 NOTE — ANESTHESIA PROCEDURE NOTES
Spinal Block    Patient location during procedure: OR  End time: 4/22/2025 8:04 AM  Reason for block: primary anesthetic  Staffing  Performed: anesthesiologist   Anesthesiologist: David Mills MD  Performed by: David Mills MD  Authorized by: David Mills MD    Spinal Block  Patient position: sitting  Prep: ChloraPrep  Patient monitoring: cardiac monitor, continuous pulse ox and frequent blood pressure checks  Approach: midline  Location: L3/L4  Provider prep: mask and sterile gloves  Local infiltration: lidocaine  Needle  Needle type: pencil-tip   Needle gauge: 25 G  Needle length: 3.5 in  Assessment  Events: SAB placement uncomplicated.  No paresthesia.  Swirl obtained: Yes  CSF: clear  Attempts: 1  Hemodynamics: stable  Additional Notes  3 cc 1% lidocaine local injected at needle insertion site.  Risks discussed including damage to muscle or nerve.  Preanesthetic Checklist  Completed: patient identified, IV checked, risks and benefits discussed, equipment checked, pre-op evaluation, timeout performed, anesthesia consent given, oxygen available and monitors applied/VS acknowledged

## 2025-04-22 NOTE — DISCHARGE SUMMARY
[facility]  Total Joint Discharge Summary      Patient ID:  Huang Lal  [id number]  55 y.o.  1970    Admit date: 4/22/2025  Discharge date and time: 04/22/25   Admitting Physician: David Coleman MD  Surgeon: Same  Admission Diagnoses: Osteoarthritis of right knee [M17.11]  Arthritis of right knee [M17.11]  Discharge Diagnoses: Principal Problem:    Osteoarthritis of right knee  Active Problems:    Arthritis of right knee  Resolved Problems:    * No resolved hospital problems. *                              Perioperative Antibiotics: Ancef 1 to 3 g was given depending on patient's weight. If allergic to Ancef or due to other indications, patient was given Vancomycin/Gent per protocol      Hospital Medications given:   celecoxib, 200 mg, Daily  cetirizine, 5 mg, Daily  famotidine, 20 mg, BID  triamcinolone, 1 spray, Daily  sodium chloride flush, 5-40 mL, 2 times per day  acetaminophen, 650 mg, Q6H  ceFAZolin (ANCEF) IV, 2,000 mg, Q8H  sennosides-docusate sodium, 1 tablet, BID  aspirin, 81 mg, BID  [START ON 4/23/2025] dexAMETHasone, 10 mg, Q6H      sodium chloride  sodium chloride      pseudoephedrine, 60 mg, Q6H PRN  sodium chloride flush, 5-40 mL, PRN  sodium chloride, , PRN  oxyCODONE, 5 mg, Q4H PRN   Or  oxyCODONE, 10 mg, Q4H PRN  HYDROmorphone, 0.5 mg, Q3H PRN   Or  HYDROmorphone, 1 mg, Q3H PRN  promethazine, 25 mg, Q6H PRN   Or  ondansetron, 4 mg, Q6H PRN  aluminum & magnesium hydroxide-simethicone, 15 mL, Q6H PRN  diphenhydrAMINE, 25 mg, Q6H PRN   Or  diphenhydrAMINE, 25 mg, Q6H PRN  melatonin, 3 mg, Nightly PRN  sodium chloride, 1 spray, Q4H PRN  methocarbamol, 750 mg, 4x Daily PRN  naloxone, 0.4 mg, PRN        Discharge Medications given:  Current Discharge Medication List        CONTINUE these medications which have NOT CHANGED    Details   oxyCODONE (ROXICODONE) 5 MG immediate release tablet Take 1-2 tablets by mouth every 4 hours as needed for Pain for up to 7

## 2025-04-22 NOTE — OP NOTE
[facility]  Velys Robot Assisted Cementless Total Knee Arthroplasty - MS  Patient:Huang Lal   : 1970  Medical Record Number:[id number]  Pre-operative Diagnosis:  Osteoarthritis of right knee [M17.11]  Arthritis of right knee [M17.11]  Post-operative Diagnosis: Osteoarthritis of right knee [M17.11]  Arthritis of right knee [M17.11]    Surgeon: MARLYN GUO MD  Assistant: Pollo Guadalupe PA-C    This surgical assistant was present for the procedure and vital for the performance of the procedure. He assisted with exposure and retraction during the procedure as well as wound closure and dressing application after the procedure.    Anesthesia: Spinal    Procedure: Total Knee Arthroplasty   The complexity of the total joint surgery requires the use of a first assistant for positioning, retraction and assistance in closure. The patient's Body mass index is 31.88 kg/m²., BMI's greater then 35 make surgical exposure and retraction extremely difficult and increase operative time.    Tourniquet Time: none  EBL: 300cc  Additional Findings: Severe DJD  Releases none    Huang Lal was brought to the operating room and positioned on the operating table.  He was anethestized  IV antibiotics were administered per CMS protocol. Prior to the incision being made a timeout was called identifying the patient, procedure ,operative side and surgeon.The right leg was prepped and draped in the usual sterile manner  An anterior longitudinal incision was accomplished just medial to the tibial tubercle and extending approximal 6 centimeters proximal to the superior pole of the patella.  A medial parapatellar capsular incision was performed. The medial capsular flap was elevated around to the insertion of the semimembranous tendon.  The patella was everted and the knee flexed and externally rotated.  The medial and lateral menisci were excised.  The lateral half of the fat pad excised

## 2025-04-22 NOTE — RT PROTOCOL NOTE
patients with a diagnosis of COPD at oxygen saturation between 88% - 92% to comply with GOLD Guidelines.        VIII. Safety: RCP will address the following safety issues:  Identify patient using the two patient identifiers name and birth date via ID bracelet.  Perform hand hygiene per hospital policy utilizing Standard Precautions for all patients and following transmission-based isolation as indicated per hospital policy.  Cardiac patients will be maintained at an oxygen saturation of 92%.  If a patient's FIO2 requirements necessitate changing oxygen delivery devices to a high concentration of oxygen, documentation indicating the change must be included in the progress notes, as well as in the respiratory flowsheet.   If a patient has a hemoglobin level <8 mg. RCP will consult physician before discontinuing oxygen.    IX. Interventions:   RCP will assess patient for signs of respiratory distress or suspicion of CO2 retention.   An ABG may be obtained for patients exhibiting respiratory distress or sickle cell crisis.   An order should be entered into patient's EMR for ABG under “per protocol”.    X. Documentation  Document assessment findings in the respiratory section of the patient's EMR.  Document changes in therapy per protocol in the respiratory orders section and in the care plan section of the patient's EMR.  Document patient education in the patient education section of the patient's EMR.    XI. Reportable Conditions:  Report to the physician immediately:  Acute changes in patient's respiratory status.  An oxygen saturation <85%.  A change in oxygen delivery device to provide a high concentration of oxygen.    XII. Patient Instructions: Review with Patient  Purpose of oxygen therapy  Proper technique for using oxygen  No smoking policy    Approval: Pulmonary Committee (1-25-96)  Revision: Chest Committee (4-28-05)    L - Respiratory Care Department Policy, Procedure and Protocol Guideline Manual, 1995,      frequently as needed for the individual patient.     II. Purpose:     To provide a process that will allow for ongoing assessment and care plan modification for patients receiving respiratory services based on both objective and or subjective patient responses to interventions. This process of protocol utilization will assist in patient care progression while eliminating the need for the physician to continually update respiratory therapy orders.   To assure continuity of respiratory care that meets [facility] Clinical Practice Guidelines.    III. Initiation:  Implement Respiratory Care Protocols for patients who are ordered by physician          to receive respiratory therapy procedures or for ventilator management.     IV. Protocol:  Upon receiving an order for therapy the RCP will review the patient's EMR (electronic medical record) for all pertinent information including:  Physician's order for therapy  Patient history and physical examination  Physician progress notes  Diagnostic. X-rays, PFT's, arterial blood gases etc.  The RCP will perform a respiratory assessment in the following manner:  General observations: color, pattern and effort of breathing, chest expansion, (symmetrical and bilateral), level of consciousness and the ability to ambulate.  The RCP will assess patient's cough ability and determine if bronchial hygiene is needed. If patient is unable to produce sputum, at that time, the RCP should question the patient with regard to their sputum: production, color consistency, frequency and amount.  Auscultation: Using a stethoscope, the RCP will listen and note quality of breath sounds and presence or absence of adventitious breath sounds in all lung fields, both anteriorly and posteriorly.  Upon completing the EMR review and physical assessment, the RCP will document findings in the “RT Assessment section” of the EMR. The score level will be provided and will be used to determine the frequency of

## 2025-04-22 NOTE — ANESTHESIA POSTPROCEDURE EVALUATION
Department of Anesthesiology  Postprocedure Note    Patient: Huang Lal  MRN: [id number]  YOB: 1970  Date of evaluation: 4/22/2025    Procedure Summary       Date: 04/22/25 Room / Location: [facility] MAIN OR 01 / [facility] MAIN OR    Anesthesia Start: 0750 Anesthesia Stop: 1000    Procedure: KNEE TOTAL ARTHROPLASTY ROBOTIC VELYS-RIGHT SDD (Right: Knee) Diagnosis:       Osteoarthritis of right knee      (Osteoarthritis of right knee [M17.11])    Surgeons: David Coleman MD Responsible Provider: David Mills MD    Anesthesia Type: Spinal ASA Status: 2            Anesthesia Type: Spinal    Matti Phase I: Matti Score: 8    Matti Phase II:      Anesthesia Post Evaluation    Patient location during evaluation: PACU  Patient participation: complete - patient participated  Level of consciousness: awake  Airway patency: patent  Nausea & Vomiting: no nausea and no vomiting  Cardiovascular status: blood pressure returned to baseline  Respiratory status: acceptable  Hydration status: euvolemic  Comments: --------------------            04/22/25               1149     --------------------   BP:       112/76     Pulse:      62       Resp:       18       Temp:                SpO2:      96%      --------------------    Pain management: adequate    No notable events documented.

## 2025-04-23 VITALS
BODY MASS INDEX: 31.74 KG/M2 | WEIGHT: 197.5 LBS | RESPIRATION RATE: 14 BRPM | SYSTOLIC BLOOD PRESSURE: 118 MMHG | HEART RATE: 73 BPM | DIASTOLIC BLOOD PRESSURE: 73 MMHG | OXYGEN SATURATION: 93 % | TEMPERATURE: 98.2 F | HEIGHT: 66 IN

## 2025-04-23 PROCEDURE — 97535 SELF CARE MNGMENT TRAINING: CPT

## 2025-04-23 PROCEDURE — 6370000000 HC RX 637 (ALT 250 FOR IP): Performed by: PHYSICIAN ASSISTANT

## 2025-04-23 PROCEDURE — 97530 THERAPEUTIC ACTIVITIES: CPT

## 2025-04-23 PROCEDURE — 2500000003 HC RX 250 WO HCPCS: Performed by: PHYSICIAN ASSISTANT

## 2025-04-23 PROCEDURE — 6370000000 HC RX 637 (ALT 250 FOR IP): Performed by: NURSE PRACTITIONER

## 2025-04-23 RX ORDER — PANTOPRAZOLE SODIUM 40 MG/1
40 TABLET, DELAYED RELEASE ORAL ONCE
Status: COMPLETED | OUTPATIENT
Start: 2025-04-23 | End: 2025-04-23

## 2025-04-23 RX ADMIN — SODIUM CHLORIDE, PRESERVATIVE FREE 10 ML: 5 INJECTION INTRAVENOUS at 08:01

## 2025-04-23 RX ADMIN — CETIRIZINE HYDROCHLORIDE 5 MG: 5 TABLET ORAL at 07:54

## 2025-04-23 RX ADMIN — PANTOPRAZOLE SODIUM 40 MG: 40 TABLET, DELAYED RELEASE ORAL at 05:28

## 2025-04-23 RX ADMIN — ASPIRIN 81 MG: 81 TABLET, COATED ORAL at 07:54

## 2025-04-23 RX ADMIN — CELECOXIB 200 MG: 200 CAPSULE ORAL at 07:54

## 2025-04-23 RX ADMIN — SENNOSIDES, DOCUSATE SODIUM 1 TABLET: 50; 8.6 TABLET, FILM COATED ORAL at 07:54

## 2025-04-23 RX ADMIN — ACETAMINOPHEN 650 MG: 325 TABLET, FILM COATED ORAL at 05:28

## 2025-04-23 RX ADMIN — FAMOTIDINE 20 MG: 20 TABLET, FILM COATED ORAL at 07:54

## 2025-04-23 RX ADMIN — OXYCODONE 5 MG: 5 TABLET ORAL at 05:28

## 2025-04-23 ASSESSMENT — PAIN DESCRIPTION - LOCATION: LOCATION: KNEE

## 2025-04-23 ASSESSMENT — PAIN SCALES - GENERAL
PAINLEVEL_OUTOF10: 1
PAINLEVEL_OUTOF10: 3
PAINLEVEL_OUTOF10: 0

## 2025-04-23 ASSESSMENT — PAIN DESCRIPTION - DESCRIPTORS: DESCRIPTORS: PRESSURE;STABBING;THROBBING

## 2025-04-23 ASSESSMENT — PAIN - FUNCTIONAL ASSESSMENT: PAIN_FUNCTIONAL_ASSESSMENT: PREVENTS OR INTERFERES SOME ACTIVE ACTIVITIES AND ADLS

## 2025-04-23 ASSESSMENT — PAIN DESCRIPTION - ORIENTATION: ORIENTATION: RIGHT

## 2025-04-23 NOTE — DISCHARGE INSTRUCTIONS
after a knee replacement. Rehab will help you strengthen the muscles of the knee and help you regain movement. After you recover, your artificial knee will allow you to do normal daily activities with less pain or no pain at all. You may be able to hike, dance, or ride a bike. Talk to your doctor about whether you can do more strenuous activities. Always tell your caregivers that you have an artificial knee.  How long it will take to walk on your own, return to normal activities, and go back to work depends on your health and how well your rehabilitation (rehab) program goes. The better you do with your rehab exercises, the quicker you will get your strength and movement back.  This care sheet gives you a general idea about how long it will take for you to recover. But each person recovers at a different pace. Follow the steps below to get better as quickly as possible.  How can you care for yourself at home?  Activity    Rest when you feel tired. You may take a nap, but don't stay in bed all day. When you sit, use a chair with arms. You can use the arms to help you stand up.     Work with your physical therapist to find the best way to exercise. What you can do as your knee heals will depend on whether your new knee is cemented or uncemented. You may not be able to do certain things for a while if your new knee is uncemented.     After your knee has healed enough, you can do more strenuous activities with caution.  You can golf, but you may want to use a golf cart for some time. And don't wear shoes with spikes.  You can bike on a flat road or on a stationary bike. Talk to your doctor before biking uphill.  Your doctor may suggest that you stay away from activities that put stress on your knee. These include tennis, badminton, contact sports like football, jumping (such as in basketball), jogging, and running.  Avoid activities where you might fall.     Do not sit for more than 1 hour at a time. Get up and walk  blood clots. If you take a blood thinner, be sure you get instructions about how to take your medicine safely. Blood thinners can cause serious bleeding problems. This medicine could be in pill form or as a shot (injection). If a shot is needed, your doctor will tell you how to do this.     Be safe with medicines. Take pain medicines exactly as directed.  If the doctor gave you a prescription medicine for pain, take it as prescribed.  If you are not taking a prescription pain medicine, ask your doctor if you can take an over-the-counter medicine.  Plan to take your pain medicine 30 minutes before exercises. It is easier to prevent pain before it starts than to stop it after it has started.     If you think your pain medicine is making you sick to your stomach:  Take your medicine after meals (unless your doctor has told you not to).  Ask your doctor for a different pain medicine.     If your doctor prescribed antibiotics, take them as directed. Do not stop taking them just because you feel better. You need to take the full course of antibiotics.   Incision care    If your doctor told you how to care for your cut (incision), follow your doctor's instructions. You will have a dressing over the cut. A dressing helps the incision heal and protects it. Your doctor will tell you how to take care of this.     If you did not get instructions, follow this general advice:  If you have strips of tape on the cut the doctor made, leave the tape on for a week or until it falls off.  If you have stitches or staples, your doctor will tell you when to come back to have them removed.  If you have skin glue on the cut, leave it on until it falls off. Skin glue is also called skin adhesive or liquid stitches.  Change the bandage every day.  Wash the area daily with warm water, and pat it dry. Don't use hydrogen peroxide or alcohol. They can slow healing.  You may cover the area with a gauze bandage if it oozes fluid or rubs against

## 2025-04-23 NOTE — PROGRESS NOTES
OCCUPATIONAL THERAPY Daily Note, Discharge, and AM      (Link to Caseload Tracking: OT Visit Days: 2  OT Orders   Time  OT Charge Capture  Rehab Caseload Tracker  Episode     Huang Lal is a 55 y.o. male   PRIMARY DIAGNOSIS: Osteoarthritis of right knee  Osteoarthritis of right knee [M17.11]  Arthritis of right knee [M17.11]  Procedure(s) (LRB):  KNEE TOTAL ARTHROPLASTY ROBOTIC VELYS-RIGHT SDD (Right)  1 Day Post-Op  Reason for Referral: Pain in Right Knee (M25.561)  Stiffness of Right Knee, Not elsewhere classified (M25.661)  Outpatient in a bed: [place] / [place] / Product Type: *No Product type* /     ASSESSMENT:     REHAB RECOMMENDATIONS:   Recommendation to date pending progress:  Setting:  No further skilled occupational therapy after discharge from hospital    Equipment:    None     ASSESSMENT:   Mr. Jose Lal is s/p R TKA.  Patient completed shower and dressing as charted below in ADL grid and is ambulating with rolling walker and stand by assist.  Patient has met 4/4 goals and plans to return home with good family support.  Family able to provide patient with appropriate level of assistance at this time.  OT educated pt on bathing safety/hygiene, use of cryocuff, resting joint position, compensatory strategies for ADLs, safe use of AD, fall prevention strategies, pt verbalized understanding.        [facility] AM-PAC™ “6 Clicks” Daily Activity Inpatient Short Form:     AM-PAC Daily Activity - Inpatient   How much help is needed for putting on and taking off regular lower body clothing?: A Little  How much help is needed for bathing (which includes washing, rinsing, drying)?: A Little  How much help is needed for toileting (which includes using toilet, bedpan, or urinal)?: None  How much help is needed for putting on and taking off regular upper body clothing?: None  How much help is needed for taking care of personal grooming?: None  How much help for eating

## 2025-04-23 NOTE — CARE COORDINATION
Patient requesting a rolling walker and did not have a preference towards provider. Order obtained and referral sent to [facility]. Walker delivered to pt prior to dc.

## 2025-04-23 NOTE — PLAN OF CARE
Problem: Pain  Goal: Verbalizes/displays adequate comfort level or baseline comfort level  4/22/2025 2324 by Karie Raymond RN  Outcome: Progressing  4/22/2025 1111 by Sofia Sosa RN  Outcome: Progressing     Problem: Safety - Adult  Goal: Free from fall injury  4/22/2025 2324 by Karie Raymond RN  Outcome: Progressing  4/22/2025 1111 by Sofia Sosa RN  Outcome: Progressing     Problem: Discharge Planning  Goal: Discharge to home or other facility with appropriate resources  4/22/2025 2324 by Karie Raymond RN  Outcome: Progressing  4/22/2025 1111 by Sofia Sosa RN  Outcome: Progressing     Problem: ABCDS Injury Assessment  Goal: Absence of physical injury  Outcome: Progressing

## 2025-04-23 NOTE — PROGRESS NOTES
Name: Huang Lal  YOB: 1970  Gender: male  MRN: [id number]           2025         Post Op day: 1 Day Post-Op     Admit Date: 2025  Admit Diagnosis: Osteoarthritis of right knee [M17.11]  Arthritis of right knee [M17.11]  Procedure: Procedure(s) (LRB):  KNEE TOTAL ARTHROPLASTY ROBOTIC VELYS-RIGHT SDD (Right)     LAB:    No results found for this or any previous visit (from the past 24 hours).  Vital Signs:    Patient Vitals for the past 8 hrs:   BP Temp Pulse Resp SpO2   25 0701 118/73 98.2 °F (36.8 °C) 73 14 93 %   25 0558 -- -- -- 17 --   25 0028 (!) 97/59 -- 89 17 94 %     Temp (24hrs), Av.9 °F (36.6 °C), Min:97.7 °F (36.5 °C), Max:98.2 °F (36.8 °C)    Body mass index is 31.88 kg/m².  Pain Control:        Subjective: Awake and alert     Objective: Vital Signs are Stable, No Acute Distress, Alert and Oriented, Dressing is Dry, Calves soft, Neurovascular exam is normal.        PT/OT:     Ambulation/Gait (if applicable):   Ambulation  Distance: 150      Weight Bearing Status: WBAT    Meds:  celecoxib, 200 mg, Daily  cetirizine, 5 mg, Daily  famotidine, 20 mg, BID  sodium chloride flush, 5-40 mL, 2 times per day  acetaminophen, 650 mg, Q6H  sennosides-docusate sodium, 1 tablet, BID  aspirin, 81 mg, BID  dexAMETHasone, 10 mg, Q6H      sodium chloride, Last Rate: 100 mL/hr at 25 1712  sodium chloride      pseudoephedrine, 60 mg, Q6H PRN  sodium chloride flush, 5-40 mL, PRN  sodium chloride, , PRN  oxyCODONE, 5 mg, Q4H PRN   Or  oxyCODONE, 10 mg, Q4H PRN  HYDROmorphone, 0.5 mg, Q3H PRN   Or  HYDROmorphone, 1 mg, Q3H PRN  promethazine, 25 mg, Q6H PRN   Or  ondansetron, 4 mg, Q6H PRN  aluminum & magnesium hydroxide-simethicone, 15 mL, Q6H PRN  diphenhydrAMINE, 25 mg, Q6H PRN   Or  diphenhydrAMINE, 25 mg, Q6H PRN  melatonin, 3 mg, Nightly PRN  sodium chloride, 1 spray, Q4H PRN  methocarbamol, 750 mg, 4x Daily PRN  naloxone, 0.4 mg,

## 2025-04-24 NOTE — TELEPHONE ENCOUNTER
Spoke with jesus and let her know that [facility] will be the provider following the patient for HH care.

## 2025-05-09 ENCOUNTER — TELEPHONE (OUTPATIENT)
Dept: INTERNAL MEDICINE CLINIC | Facility: CLINIC | Age: 55
End: 2025-05-09

## 2025-05-09 NOTE — TELEPHONE ENCOUNTER
Spoke with patient regarding TCM care and PCP follow-up.  He is doing well with his rehabilitation and is still interested in establishing care with a [facility] PCP.  Will ask Jazmín Celaya to kindly assist.

## 2025-05-18 DIAGNOSIS — M17.11 PRIMARY OSTEOARTHRITIS OF RIGHT KNEE: ICD-10-CM

## 2025-05-19 RX ORDER — ASPIRIN 81 MG/1
81 TABLET, COATED ORAL 2 TIMES DAILY
Qty: 60 TABLET | Refills: 0 | OUTPATIENT
Start: 2025-05-19

## 2025-05-19 RX ORDER — CELECOXIB 200 MG/1
CAPSULE ORAL DAILY
Qty: 30 CAPSULE | Refills: 0 | OUTPATIENT
Start: 2025-05-19

## 2025-05-28 ENCOUNTER — OFFICE VISIT (OUTPATIENT)
Dept: ORTHOPEDIC SURGERY | Age: 55
End: 2025-05-28

## 2025-05-28 DIAGNOSIS — Z96.651 S/P TOTAL KNEE ARTHROPLASTY, RIGHT: Primary | ICD-10-CM

## 2025-05-28 PROCEDURE — 99024 POSTOP FOLLOW-UP VISIT: CPT | Performed by: PHYSICIAN ASSISTANT

## 2025-05-28 NOTE — PROGRESS NOTES
Name: Huang Lal  YOB: 1970  Gender: male  MRN: [id number]    RIGHT Post-Op TKA 4 week follow up    This patient returns now four week status post s/p TKA. Things have gone reasonably well with therapy and the patient is now weaning from the cane. The pain level has improved. There has been no significant problem since the patient was last seen. On exam, the incision is healing approriately. ROM is 0 to 125. The patient has minimal antalgia in stance and good alignment in stance.    Radiographs are obtained. Standing AP, flexion lateral and sunrise views of the RIGHT knee demonstrates well positioned TKA with good bone implant interfaces. No significant abnormalities are seen.          RADIOGRAPHIC IMPRESSION: Stable RIGHT TKA.           CLINICAL IMPRESSION AND PLAN: Now four weeks s/p TKA .  The patient is doing reasonably well. I have made recommendations about activity. We will ask the patient to continue to wean from the cane. Recommendations for further therapy include OUTPATIENT THERAPY FOR 3-4 WEEKS. The patient will follow back up in in 4-5 months.      Work/Activity Restrictions: NO RESTRICTIONS    BALTA Young

## 2025-05-29 ENCOUNTER — OFFICE VISIT (OUTPATIENT)
Dept: PRIMARY CARE CLINIC | Facility: CLINIC | Age: 55
End: 2025-05-29
Payer: COMMERCIAL

## 2025-05-29 VITALS
HEART RATE: 70 BPM | DIASTOLIC BLOOD PRESSURE: 79 MMHG | TEMPERATURE: 98.3 F | BODY MASS INDEX: 31.82 KG/M2 | WEIGHT: 198 LBS | HEIGHT: 66 IN | SYSTOLIC BLOOD PRESSURE: 118 MMHG | RESPIRATION RATE: 18 BRPM | OXYGEN SATURATION: 98 %

## 2025-05-29 DIAGNOSIS — Z86.39 HISTORY OF HYPERCHOLESTEROLEMIA: ICD-10-CM

## 2025-05-29 DIAGNOSIS — Z12.11 COLON CANCER SCREENING: ICD-10-CM

## 2025-05-29 DIAGNOSIS — Z11.59 NEED FOR HEPATITIS C SCREENING TEST: ICD-10-CM

## 2025-05-29 DIAGNOSIS — N52.9 ERECTILE DYSFUNCTION, UNSPECIFIED ERECTILE DYSFUNCTION TYPE: ICD-10-CM

## 2025-05-29 DIAGNOSIS — Z12.5 ENCOUNTER FOR SCREENING PROSTATE SPECIFIC ANTIGEN (PSA) MEASUREMENT: ICD-10-CM

## 2025-05-29 DIAGNOSIS — Z00.00 ANNUAL PHYSICAL EXAM: Primary | ICD-10-CM

## 2025-05-29 DIAGNOSIS — E66.811 CLASS 1 OBESITY WITH SERIOUS COMORBIDITY AND BODY MASS INDEX (BMI) OF 31.0 TO 31.9 IN ADULT, UNSPECIFIED OBESITY TYPE: ICD-10-CM

## 2025-05-29 DIAGNOSIS — Z76.89 ENCOUNTER TO ESTABLISH CARE: ICD-10-CM

## 2025-05-29 DIAGNOSIS — R73.03 PREDIABETES: ICD-10-CM

## 2025-05-29 DIAGNOSIS — Z11.4 ENCOUNTER FOR SCREENING FOR HIV: ICD-10-CM

## 2025-05-29 PROBLEM — R00.1 SINUS BRADYCARDIA: Status: RESOLVED | Noted: 2020-09-17 | Resolved: 2025-05-29

## 2025-05-29 PROBLEM — G93.32 CHRONIC FATIGUE SYNDROME: Status: RESOLVED | Noted: 2025-05-29 | Resolved: 2025-05-29

## 2025-05-29 PROBLEM — R00.2 PALPITATIONS: Status: RESOLVED | Noted: 2020-10-05 | Resolved: 2025-05-29

## 2025-05-29 PROBLEM — E66.9 OBESITY: Status: ACTIVE | Noted: 2025-05-29

## 2025-05-29 PROBLEM — M17.11 OSTEOARTHRITIS OF RIGHT KNEE: Status: RESOLVED | Noted: 2025-03-04 | Resolved: 2025-05-29

## 2025-05-29 PROBLEM — F51.02 ADJUSTMENT INSOMNIA: Status: RESOLVED | Noted: 2025-05-29 | Resolved: 2025-05-29

## 2025-05-29 PROCEDURE — 99386 PREV VISIT NEW AGE 40-64: CPT | Performed by: NURSE PRACTITIONER

## 2025-05-29 RX ORDER — IBUPROFEN 800 MG/1
800 TABLET, FILM COATED ORAL EVERY 6 HOURS PRN
COMMUNITY

## 2025-05-29 RX ORDER — ACETAMINOPHEN 500 MG
1000 TABLET ORAL EVERY 6 HOURS PRN
COMMUNITY

## 2025-05-29 ASSESSMENT — PATIENT HEALTH QUESTIONNAIRE - PHQ9
2. FEELING DOWN, DEPRESSED OR HOPELESS: NOT AT ALL
SUM OF ALL RESPONSES TO PHQ QUESTIONS 1-9: 0
1. LITTLE INTEREST OR PLEASURE IN DOING THINGS: NOT AT ALL

## 2025-05-29 ASSESSMENT — ENCOUNTER SYMPTOMS
SORE THROAT: 0
DIARRHEA: 0
ABDOMINAL PAIN: 0
VOMITING: 0
NAUSEA: 0
BLOOD IN STOOL: 0
SHORTNESS OF BREATH: 0
COUGH: 0

## 2025-05-29 NOTE — PROGRESS NOTES
Huang Lal (:  1970) is a 55 y.o. male here for evaluation of the following chief complaint(s):  Chief Complaint   Patient presents with    New Patient     Patient presents for a new patient appointment to establish care, patient doesn't have any concerns right now.       Reviewed and updated this visit by provider:  Tobacco  Allergies  Meds  Problems  Med Hx  Surg Hx  Fam Hx         HPI   Patient presents today to establish care with PCP. Hx of TKA right side, fatty liver, high cholesterol.  Patient had TKA right side with [facility] performed 25 for chronic knee pain. Previously was seen by a PCP locally last visit a few years ago. Rates pain in knee a one or two, taking Tylenol or Ibuprofen for prn. 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     Prediabetes- Recent A1c 6.0%. in 2025. Denies hx of DM or preDM. States does not drink soda but does like sweets. ETOH 3-4 drinks/week.    Health Maintenance  Last colonoscopy never  Last eye exam annually  Sees a dentist occasionally    Immunizations  Pneumonia - has not obtained  Tdap 2017  Shingles never    The patient's past medical history, social history and family history was reviewed.    Immunizations:  Immunization status: up to date and documented.    Review of Systems:   Review of Systems   Constitutional:  Negative for chills and fever.   HENT:  Negative for hearing loss and sore throat.    Eyes:  Negative for visual disturbance.   Respiratory:  Negative for cough and shortness of breath.    Cardiovascular:  Positive for leg swelling (right knee). Negative for chest pain and palpitations.   Gastrointestinal:  Negative for abdominal pain, blood in stool, diarrhea, nausea and vomiting.   Genitourinary:  Negative for dysuria and penile discharge.   Musculoskeletal:  Positive for arthralgias.   Skin:  Negative for rash.

## 2025-06-05 DIAGNOSIS — E66.811 CLASS 1 OBESITY WITH SERIOUS COMORBIDITY AND BODY MASS INDEX (BMI) OF 31.0 TO 31.9 IN ADULT, UNSPECIFIED OBESITY TYPE: ICD-10-CM

## 2025-06-05 DIAGNOSIS — Z11.4 ENCOUNTER FOR SCREENING FOR HIV: ICD-10-CM

## 2025-06-05 DIAGNOSIS — R73.03 PREDIABETES: ICD-10-CM

## 2025-06-05 DIAGNOSIS — Z86.39 HISTORY OF HYPERCHOLESTEROLEMIA: ICD-10-CM

## 2025-06-05 DIAGNOSIS — Z11.59 NEED FOR HEPATITIS C SCREENING TEST: ICD-10-CM

## 2025-06-05 DIAGNOSIS — Z76.89 ENCOUNTER TO ESTABLISH CARE: ICD-10-CM

## 2025-06-05 DIAGNOSIS — Z12.5 ENCOUNTER FOR SCREENING PROSTATE SPECIFIC ANTIGEN (PSA) MEASUREMENT: ICD-10-CM

## 2025-06-05 DIAGNOSIS — Z00.00 ANNUAL PHYSICAL EXAM: ICD-10-CM

## 2025-06-05 LAB
ALBUMIN SERPL-MCNC: 4 G/DL (ref 3.5–5)
ALBUMIN/GLOB SERPL: 1.1 (ref 1–1.9)
ALP SERPL-CCNC: 113 U/L (ref 40–129)
ALT SERPL-CCNC: 44 U/L (ref 8–55)
ANION GAP SERPL CALC-SCNC: 12 MMOL/L (ref 7–16)
AST SERPL-CCNC: 24 U/L (ref 15–37)
BASOPHILS # BLD: 0.07 K/UL (ref 0–0.2)
BASOPHILS NFR BLD: 1.4 % (ref 0–2)
BILIRUB SERPL-MCNC: 0.3 MG/DL (ref 0–1.2)
BUN SERPL-MCNC: 19 MG/DL (ref 6–23)
CALCIUM SERPL-MCNC: 9.6 MG/DL (ref 8.8–10.2)
CHLORIDE SERPL-SCNC: 105 MMOL/L (ref 98–107)
CHOLEST SERPL-MCNC: 177 MG/DL (ref 0–200)
CO2 SERPL-SCNC: 23 MMOL/L (ref 20–29)
CREAT SERPL-MCNC: 0.83 MG/DL (ref 0.8–1.3)
DIFFERENTIAL METHOD BLD: ABNORMAL
EOSINOPHIL # BLD: 0.2 K/UL (ref 0–0.8)
EOSINOPHIL NFR BLD: 3.9 % (ref 0.5–7.8)
ERYTHROCYTE [DISTWIDTH] IN BLOOD BY AUTOMATED COUNT: 13.2 % (ref 11.9–14.6)
EST. AVERAGE GLUCOSE BLD GHB EST-MCNC: 110 MG/DL
GLOBULIN SER CALC-MCNC: 3.5 G/DL (ref 2.3–3.5)
GLUCOSE SERPL-MCNC: 116 MG/DL (ref 70–99)
HBA1C MFR BLD: 5.5 % (ref 0–5.6)
HCT VFR BLD AUTO: 41.7 % (ref 41.1–50.3)
HCV AB SER QL: NONREACTIVE
HDLC SERPL-MCNC: 39 MG/DL (ref 40–60)
HDLC SERPL: 4.5 (ref 0–5)
HGB BLD-MCNC: 13.5 G/DL (ref 13.6–17.2)
HIV 1+2 AB+HIV1 P24 AG SERPL QL IA: NONREACTIVE
HIV 1/2 RESULT COMMENT: NORMAL
IMM GRANULOCYTES # BLD AUTO: 0.01 K/UL (ref 0–0.5)
IMM GRANULOCYTES NFR BLD AUTO: 0.2 % (ref 0–5)
LDLC SERPL CALC-MCNC: 122 MG/DL (ref 0–100)
LYMPHOCYTES # BLD: 2.23 K/UL (ref 0.5–4.6)
LYMPHOCYTES NFR BLD: 43.7 % (ref 13–44)
MCH RBC QN AUTO: 30.5 PG (ref 26.1–32.9)
MCHC RBC AUTO-ENTMCNC: 32.4 G/DL (ref 31.4–35)
MCV RBC AUTO: 94.3 FL (ref 82–102)
MONOCYTES # BLD: 0.56 K/UL (ref 0.1–1.3)
MONOCYTES NFR BLD: 11 % (ref 4–12)
NEUTS SEG # BLD: 2.03 K/UL (ref 1.7–8.2)
NEUTS SEG NFR BLD: 39.8 % (ref 43–78)
NRBC # BLD: 0 K/UL (ref 0–0.2)
PLATELET # BLD AUTO: 279 K/UL (ref 150–450)
PMV BLD AUTO: 10.1 FL (ref 9.4–12.3)
POTASSIUM SERPL-SCNC: 4.3 MMOL/L (ref 3.5–5.1)
PROT SERPL-MCNC: 7.6 G/DL (ref 6.3–8.2)
PSA SERPL-MCNC: 0.5 NG/ML (ref 0–4)
RBC # BLD AUTO: 4.42 M/UL (ref 4.23–5.6)
SODIUM SERPL-SCNC: 140 MMOL/L (ref 136–145)
TRIGL SERPL-MCNC: 80 MG/DL (ref 0–150)
TSH W FREE THYROID IF ABNORMAL: 1.34 UIU/ML (ref 0.27–4.2)
VLDLC SERPL CALC-MCNC: 16 MG/DL (ref 6–23)
WBC # BLD AUTO: 5.1 K/UL (ref 4.3–11.1)

## 2025-06-09 ENCOUNTER — RESULTS FOLLOW-UP (OUTPATIENT)
Dept: PRIMARY CARE CLINIC | Facility: CLINIC | Age: 55
End: 2025-06-09

## 2025-07-21 NOTE — PROGRESS NOTES
ACUTE PHYSICAL THERAPY GOALS:   (Developed with and agreed upon by patient and/or caregiver.)  GOALS (1-4 days):  (1.)Mr. Jose Lal will move from supine to sit and sit to supine  in bed with SUPERVISION.  (2.)Mr. Jose Lal will transfer from bed to chair and chair to bed with SUPERVISION using the least restrictive device.  (3.)Mr. Jose Lal will ambulate with SUPERVISION for 300 feet with the least restrictive device.  (4.)Mr. Jose Lal will ambulate up/down 4 steps with rails with CONTACT GUARD ASSIST.  (5.)Mr. Jose Lal will increase right knee ROM to 0-90°.  ________________________________________________________________________________________________      PHYSICAL THERAPY: TOTAL KNEE ARTHROPLASTY Daily Note and AM  (Link to Caseload Tracking: PT Visit Days : 2  Acknowledge Orders  Time In/Out  PT Charge Capture  Rehab Caseload Tracker  Episode   Huang Lal is a 55 y.o. male   PRIMARY DIAGNOSIS: Osteoarthritis of right knee  Osteoarthritis of right knee [M17.11]  Arthritis of right knee [M17.11]  Procedure(s) (LRB):  KNEE TOTAL ARTHROPLASTY ROBOTIC VELYS-RIGHT SDD (Right)  1 Day Post-Op  Reason for Referral: Pain in Right Knee (M25.561)  Stiffness of Right Knee, Not elsewhere classified (M25.661)  Difficulty in walking, Not elsewhere classified (R26.2)  Outpatient in a [place]: [place] / [place] / Product Type: *No Product type* /     REHAB RECOMMENDATIONS:   Recommendation to date pending progress:  Setting:  Home Health Therapy    Equipment:    Rolling Walker     RANGE OF MOTION:   Right Knee Flexion: R Knee Flexion (0-145): 79  Right Knee Extension: R Knee Extension (0): 3     GAIT: I Mod I S SBA CGA Min Mod Max Total  NT x2 Comments:   Level of Assistance [] [] [] [x] [] [] [] [] [] [] []            Weightbearing Status  Full weight bearingRight Lower Extremity Weight Bearing: Weight Bearing As Tolerated    Distance  100 feet    Gait Quality  denies pain/discomfort (Rating = 0)

## (undated) DEVICE — STERILE PVP: Brand: MEDLINE INDUSTRIES, INC.

## (undated) DEVICE — SUTURE MCRYL SZ 3-0 L27IN ABSRB UD L19MM PS-2 3/8 CIR PRIM Y427H

## (undated) DEVICE — SUTURE PDS II SZ 0 L27IN ABSRB VLT L26MM CT-2 1/2 CIR Z334H

## (undated) DEVICE — DRAPE EQUIP ROBOT STRL VELYS 20/PK ORDER IN MULTIIPLES OF 20 EACH

## (undated) DEVICE — ULTRABRAID 2 COBRAID 38 LENGTH SINGL: Brand: ULTRABRAID

## (undated) DEVICE — IMMOBILIZER SHLDR M UNIV 65X17IN BLK LIGHWEIGHT PCH SZ REUSE

## (undated) DEVICE — SOLUTION IV 1000ML 0.9% SOD CHL

## (undated) DEVICE — DRAPE,SHOULDER,BEACH CHAIR,STERILE: Brand: MEDLINE

## (undated) DEVICE — BLADE SAW OSCILLATING 85X19X2 MM ROBOTIC-ASSISTED VELYS

## (undated) DEVICE — (D)STRIP SKN CLSR 0.5X4IN WHT --

## (undated) DEVICE — BIPOLAR SEALER 23-112-1 AQM 6.0: Brand: AQUAMANTYS ®

## (undated) DEVICE — REM POLYHESIVE ADULT PATIENT RETURN ELECTRODE: Brand: VALLEYLAB

## (undated) DEVICE — SOLUTION WND IRRIGATION 450 ML 0.5 PVP-I 0.9 NACL

## (undated) DEVICE — GARMENT,MEDLINE,DVT,INT,CALF,MED, GEN2: Brand: MEDLINE

## (undated) DEVICE — STERILE PRESSURE PROTECTOR PAD® FOR DE MAYO UNIVERSAL DISTRACTOR® (10/CASE): Brand: DE MAYO UNIVERSAL DISTRACTOR®

## (undated) DEVICE — GLOVE SURG SZ 75 CRM LTX FREE POLYISOPRENE POLYMER BEAD ANTI

## (undated) DEVICE — AMD ANTIMICROBIAL GAUZE SPONGES,12 PLY USP TYPE VII, 0.2% POLYHEXAMETHYLENE BIGUANIDE HCI (PHMB): Brand: CURITY

## (undated) DEVICE — STRIP,CLOSURE,WOUND,MEDI-STRIP,1/2X4: Brand: MEDLINE

## (undated) DEVICE — GLOVE SURG SZ 65 THK91MIL LTX FREE SYN POLYISOPRENE

## (undated) DEVICE — ZIP DS DRESSING SHIELD: Brand: ZIP DS DRESSING SHIELD

## (undated) DEVICE — STOCKINETTE ORTH W9XL36IN COT 2 PLY HLLW FOR HANDLING LMB

## (undated) DEVICE — BANDAGE COBAN 4 IN COMPR W4INXL5YD FOAM COHESIVE QUIK STK SELF ADH SFT

## (undated) DEVICE — SYRINGE MED 50ML LUERLOCK TIP

## (undated) DEVICE — ADHESIVE LIQ H2O INSOLUBLE 3 CC LO RISK N STN MASTISOL

## (undated) DEVICE — GLOVE SURG SZ 65 L12IN FNGR THK79MIL GRN LTX FREE

## (undated) DEVICE — Device

## (undated) DEVICE — GOWN,REINFORCED,POLY,SIRUS,XLNG/XXLG: Brand: MEDLINE

## (undated) DEVICE — STERILE SYNTHETIC POLYISOPRENE POWDER-FREE SURGICAL GLOVES WITH HYDROGEL COATING, SMOOTH FINISH, STRAIGHT FINGER: Brand: PROTEXIS

## (undated) DEVICE — DUAL CUT SAGITTAL BLADE

## (undated) DEVICE — SURGICAL PROCEDURE PACK BASIC ST FRANCIS

## (undated) DEVICE — DRAPE EQUIP SATELLITE STN STRL VELYS

## (undated) DEVICE — SUTURE ETHBND EXCEL SZ 2 L27IN NONABSORBABLE GRN WHT MO-7 D7485

## (undated) DEVICE — BUTTON SWITCH PENCIL BLADE ELECTRODE, HOLSTER: Brand: EDGE

## (undated) DEVICE — SOLUTION IRRIG 3000ML 0.9% SOD CHL USP UROMATIC PLAS CONT

## (undated) DEVICE — HOOD: Brand: T7PLUS

## (undated) DEVICE — ZIP 24 SURGICAL SKIN CLOSURE DEVICE: Brand: ZIP 24 SURGICAL SKIN CLOSURE DEVICE

## (undated) DEVICE — PIN DRL 4X125 MM ROBOTIC-ASSISTED SOLUTION ARRY VELYS

## (undated) DEVICE — (D)PREP SKN CHLRAPRP APPL 26ML -- CONVERT TO ITEM 371833

## (undated) DEVICE — GLOVE ORANGE PI 8   MSG9080

## (undated) DEVICE — 2000CC GUARDIAN II: Brand: GUARDIAN

## (undated) DEVICE — SUTURE VICRYL + SZ 2-0 L27IN ABSRB UD CP-1 1/2 CIR REV CUT VCP266H

## (undated) DEVICE — WATERPROOF, BACTERIA PROOF DRESSING WITH ABSORBENT SEE THROUGH PAD: Brand: OPSITE POST-OP VISIBLE 15X10CM CTN 20

## (undated) DEVICE — BANDAGE COMPR SELF ADH 5 YDX6 IN TAN STRL PREMIERPRO LF

## (undated) DEVICE — SUTURE VICRYL SZ 1 L27IN ABSRB UD L36MM CP-1 1/2 CIR REV CUT J268H

## (undated) DEVICE — STOCKINETTE,IMPERVIOUS,12X48,STERILE: Brand: MEDLINE

## (undated) DEVICE — 3M™ TEGADERM™ TRANSPARENT FILM DRESSING FRAME STYLE, 1628, 6 IN X 8 IN (15 CM X 20 CM), 10/CT 8CT/CASE: Brand: 3M™ TEGADERM™

## (undated) DEVICE — TOTAL KNEE: Brand: MEDLINE INDUSTRIES, INC.

## (undated) DEVICE — SOLUTION IV 250ML 0.9% SOD CHL PH 5 INJ USP VIAFLX PLAS

## (undated) DEVICE — SUTURE ABS ANTIBACT 1-0 CTX 24IN STRATAFIX PDS+ SXPP1A445

## (undated) DEVICE — DRESSING HYDROFIBER AQUACEL AG ADVANTAGE 3.5X10 IN

## (undated) DEVICE — SUIT SURG ISOLATN ZIP TOGA XL T7 +

## (undated) DEVICE — GLOVE SURG SZ 8 L12IN FNGR THK79MIL GRN LTX FREE